# Patient Record
Sex: MALE | Race: WHITE | Employment: OTHER | URBAN - METROPOLITAN AREA
[De-identification: names, ages, dates, MRNs, and addresses within clinical notes are randomized per-mention and may not be internally consistent; named-entity substitution may affect disease eponyms.]

---

## 2017-03-15 ENCOUNTER — TELEPHONE (OUTPATIENT)
Dept: FAMILY MEDICINE | Facility: CLINIC | Age: 72
End: 2017-03-15

## 2017-03-15 DIAGNOSIS — E11.9 TYPE 2 DIABETES MELLITUS WITHOUT COMPLICATION (H): Primary | ICD-10-CM

## 2017-03-15 NOTE — TELEPHONE ENCOUNTER
blood glucose monitoring (NO BRAND SPECIFIED) test strip  Last Written Prescription Date: 4/10/16  Last Fill Quantity: 1,  # refills: 0   Last Office Visit with [facility], [facility] or [facility] prescribing provider: 10/12/16  Pt is wanting this filled today   Call at  [phone]  With questions   Thank you     Rica Vasquez  [facility]

## 2017-03-15 NOTE — TELEPHONE ENCOUNTER
Left message for patient to return call to clinic.    Can refill for 30 days.  Patient is due for office visit for Diabetes. LOV 9/27/16, patient to follow up for OV in 6 mos.      Nila GONZALES Rn

## 2017-03-16 NOTE — TELEPHONE ENCOUNTER
2nd attempt    Called home phone, [phone], it rang 5 times, clicked, then dial tone.  Called once again, same thing happened.  Called cell phone    Left message for patient to return call to clinic.  See message from 3/15//17 regarding refill and office visit.    Nila GONZALES Rn

## 2017-03-16 NOTE — TELEPHONE ENCOUNTER
Patient notified test strips refilled for 30 days, he is due for OV.  Patient declined to make appt at this time, he reports he will check his schedule and call back.    Nila GONZALES Rn

## 2017-03-22 ENCOUNTER — OFFICE VISIT (OUTPATIENT)
Dept: FAMILY MEDICINE | Facility: CLINIC | Age: 72
End: 2017-03-22
Payer: COMMERCIAL

## 2017-03-22 VITALS
OXYGEN SATURATION: 99 % | SYSTOLIC BLOOD PRESSURE: 130 MMHG | WEIGHT: 207 LBS | DIASTOLIC BLOOD PRESSURE: 73 MMHG | TEMPERATURE: 97.5 F | HEIGHT: 70 IN | BODY MASS INDEX: 29.63 KG/M2 | HEART RATE: 56 BPM

## 2017-03-22 DIAGNOSIS — N52.1 ERECTILE DYSFUNCTION DUE TO DISEASES CLASSIFIED ELSEWHERE: ICD-10-CM

## 2017-03-22 DIAGNOSIS — L30.9 ECZEMA, UNSPECIFIED TYPE: ICD-10-CM

## 2017-03-22 DIAGNOSIS — E11.9 TYPE 2 DIABETES MELLITUS WITHOUT COMPLICATION, WITHOUT LONG-TERM CURRENT USE OF INSULIN (H): Primary | ICD-10-CM

## 2017-03-22 DIAGNOSIS — G47.00 INSOMNIA, UNSPECIFIED TYPE: ICD-10-CM

## 2017-03-22 LAB — HBA1C MFR BLD: 6.4 % (ref 4.3–6)

## 2017-03-22 PROCEDURE — 36415 COLL VENOUS BLD VENIPUNCTURE: CPT | Performed by: FAMILY MEDICINE

## 2017-03-22 PROCEDURE — 99214 OFFICE O/P EST MOD 30 MIN: CPT | Performed by: FAMILY MEDICINE

## 2017-03-22 PROCEDURE — 83036 HEMOGLOBIN GLYCOSYLATED A1C: CPT | Performed by: FAMILY MEDICINE

## 2017-03-22 RX ORDER — ZOLPIDEM TARTRATE 12.5 MG/1
12.5 TABLET, FILM COATED, EXTENDED RELEASE ORAL
Qty: 30 TABLET | Refills: 5 | Status: ON HOLD | OUTPATIENT
Start: 2017-03-22 | End: 2021-10-25

## 2017-03-22 RX ORDER — SIMVASTATIN 20 MG
20 TABLET ORAL DAILY
Qty: 90 TABLET | Refills: 3 | Status: SHIPPED | OUTPATIENT
Start: 2017-03-22 | End: 2018-06-17

## 2017-03-22 RX ORDER — TADALAFIL 5 MG/1
2.5 TABLET ORAL DAILY
Qty: 60 TABLET | Refills: 11 | Status: SHIPPED | OUTPATIENT
Start: 2017-03-22 | End: 2017-04-04

## 2017-03-22 RX ORDER — TRIAMCINOLONE ACETONIDE 1 MG/G
CREAM TOPICAL
Qty: 80 G | Refills: 0 | Status: SHIPPED | OUTPATIENT
Start: 2017-03-22 | End: 2018-06-17

## 2017-03-22 RX ORDER — TADALAFIL 5 MG/1
2.5 TABLET ORAL DAILY
Qty: 30 TABLET | Refills: 11 | Status: SHIPPED | OUTPATIENT
Start: 2017-03-22 | End: 2017-03-22

## 2017-03-22 ASSESSMENT — PAIN SCALES - GENERAL: PAINLEVEL: NO PAIN (0)

## 2017-03-22 NOTE — PROGRESS NOTES
SUBJECTIVE:                                                    Remington Gutierrez is a 71 year old male who presents to clinic today for the following health issues:    Apparently the patient has been getting metformin 500 mg b.i.d. We will continue the present dose.    Diabetes: His hemoglobin A1c is 6.4    I recommended that he try getting his Cialis from [facility].    He needs a new glucometer.      Diabetes Follow-up      Patient is checking blood sugars: not at all    Diabetic concerns: None and other - pt has not been eating well has      Symptoms of hypoglycemia (low blood sugar): none     Paresthesias (numbness or burning in feet) or sores: Yes tingling in ankles     Date of last diabetic eye exam: 2016       Amount of exercise or physical activity: 2-3 days/week for an average of 30-45 minutes    Problems taking medications regularly: No    Medication side effects: none    Diet: regular (no restrictions)          Problem list and histories reviewed & adjusted, as indicated.  Additional history: as documented        Reviewed and updated as needed this visit by clinical staff  Allergies       Reviewed and updated as needed this visit by Provider        Medical, surgical, family, social histories, allergies and meds reviewed and updated.    ROS:  General: No change in weight, sleep or appetite.  Normal energy.  No fever or chills  Resp: No coughing, wheezing or shortness of breath  CV: No chest pains or palpitations  GI: No nausea, vomiting,  heartburn, abdominal pain, diarrhea, constipation or change in bowel habits    Exam:  GENERAL APPEARANCE ADULT: Alert, no acute distress  NECK: No adenopathy,masses or thyromegaly  RESP: lungs clear to auscultation   CV: normal rate, regular rhythm, no murmur or gallop  ABDOMEN: soft, no organomegaly, masses or tenderness    ASSESSMENT:  (E11.9) Type 2 diabetes mellitus without complication, without long-term current use of insulin (H)  (primary encounter  diagnosis)  Comment: Stable on medications.  Plan: blood glucose monitoring (NO BRAND SPECIFIED)         test strip, order for DME, metFORMIN         (GLUCOPHAGE) 500 MG tablet, simvastatin (ZOCOR)        20 MG tablet            (L30.9) Eczema, unspecified type  Comment: Stable on medications.  Plan: triamcinolone (KENALOG) 0.1 % cream            (N52.1) Erectile dysfunction due to diseases classified elsewhere  Comment: Stable on medications.  Plan: tadalafil (CIALIS) 5 MG tablet, DISCONTINUED:         tadalafil (CIALIS) 5 MG tablet            (G47.00) Insomnia, unspecified type  Comment: Stable on medications.  Plan: zolpidem (AMBIEN CR) 12.5 MG CR tablet              PLAN:  Orders Placed This Encounter     Hemoglobin A1c     blood glucose monitoring (NO BRAND SPECIFIED) test strip     order for DME     triamcinolone (KENALOG) 0.1 % cream     DISCONTD: tadalafil (CIALIS) 5 MG tablet     tadalafil (CIALIS) 5 MG tablet     metFORMIN (GLUCOPHAGE) 500 MG tablet     zolpidem (AMBIEN CR) 12.5 MG CR tablet     simvastatin (ZOCOR) 20 MG tablet   Recheck in 6 months.    There are no Patient Instructions on file for this visit.      Jake Vega

## 2017-03-22 NOTE — NURSING NOTE
"Chief Complaint   Patient presents with     RECHECK     diabetes needs foot exam       Initial /73 (BP Location: Right arm, Patient Position: Chair, Cuff Size: Adult Large)  Pulse 56  Temp 97.5  F (36.4  C) (Tympanic)  Ht 5' 10\" (1.778 m)  Wt 207 lb (93.9 kg)  SpO2 99%  BMI 29.7 kg/m2 Estimated body mass index is 29.7 kg/(m^2) as calculated from the following:    Height as of this encounter: 5' 10\" (1.778 m).    Weight as of this encounter: 207 lb (93.9 kg).  Medication Reconciliation: complete   Kassy ESCALANTE      "

## 2017-03-22 NOTE — MR AVS SNAPSHOT
"              After Visit Summary   3/22/2017    Remington Gutierrez    MRN: [id number]           Patient Information     Date Of Birth          1945        Visit Information        Provider Department      3/22/2017 11:00 AM Jake Vega MD [facility]        Today's Diagnoses     Type 2 diabetes mellitus without complication, without long-term current use of insulin (H)    -  1    Eczema, unspecified type        Erectile dysfunction due to diseases classified elsewhere        Insomnia, unspecified type           Follow-ups after your visit        Who to contact     If you have questions or need follow up information about today's clinic visit or your schedule please contact [facility] directly at [phone].  Normal or non-critical lab and imaging results will be communicated to you by MyChart, letter or phone within 4 business days after the clinic has received the results. If you do not hear from us within 7 days, please contact the clinic through MyChart or phone. If you have a critical or abnormal lab result, we will notify you by phone as soon as possible.  Submit refill requests through [employer] or call your pharmacy and they will forward the refill request to us. Please allow 3 business days for your refill to be completed.          Additional Information About Your Visit        MyChart Information     [employer] lets you send messages to your doctor, view your test results, renew your prescriptions, schedule appointments and more. To sign up, go to [email or web address] . Click on \"Log in\" on the left side of the screen, which will take you to the Welcome page. Then click on \"Sign up Now\" on the right side of the page.     You will be asked to enter the access code listed below, as well as some personal information. Please follow the directions to create your username and password.     Your access code is: [email or web address]  Expires: 6/20/2017 11:36 AM   " "  Your access code will  in 90 days. If you need help or a new code, please call your [facility] clinic or [phone].        Care EveryWhere ID     This is your Care EveryWhere ID. This could be used by other organizations to access your [facility] medical records  [id number]        Your Vitals Were     Pulse Temperature Height Pulse Oximetry BMI (Body Mass Index)       56 97.5  F (36.4  C) (Tympanic) 5' 10\" (1.778 m) 99% 29.7 kg/m2        Blood Pressure from Last 3 Encounters:   17 130/73   16 126/66   10/12/16 125/75    Weight from Last 3 Encounters:   17 207 lb (93.9 kg)   16 206 lb (93.4 kg)   10/12/16 206 lb (93.4 kg)              We Performed the Following     Hemoglobin A1c          Today's Medication Changes          These changes are accurate as of: 3/22/17 11:36 AM.  If you have any questions, ask your nurse or doctor.               Start taking these medicines.        Dose/Directions    order for DME   Used for:  Type 2 diabetes mellitus without complication, without long-term current use of insulin (H)   Started by:  Jake Vega MD        Equipment being ordered:   Touch 1 glucose meter   Quantity:  1 Device   Refills:  0       tadalafil 5 MG tablet   Commonly known as:  CIALIS   Used for:  Erectile dysfunction due to diseases classified elsewhere   Started by:  Jaek Vega MD        Dose:  2.5 mg   Take 0.5 tablets (2.5 mg) by mouth daily Never use with nitroglycerin, terazosin or doxazosin.   Quantity:  60 tablet   Refills:  11         These medicines have changed or have updated prescriptions.        Dose/Directions    * triamcinolone 0.1 % cream   Commonly known as:  KENALOG   This may have changed:  Another medication with the same name was added. Make sure you understand how and when to take each.   Used for:  Contact dermatitis and other eczema, due to unspecified cause   Changed by:  Jake Vega MD        Apply sparingly to " affected area three times daily as needed   Quantity:  80 g   Refills:  1       * triamcinolone 0.1 % cream   Commonly known as:  KENALOG   This may have changed:  You were already taking a medication with the same name, and this prescription was added. Make sure you understand how and when to take each.   Used for:  Eczema, unspecified type   Changed by:  Jake Vega MD        Apply sparingly to affected area three times daily as needed   Quantity:  80 g   Refills:  0       * Notice:  This list has 2 medication(s) that are the same as other medications prescribed for you. Read the directions carefully, and ask your doctor or other care provider to review them with you.      Stop taking these medicines if you haven't already. Please contact your care team if you have questions.     donepezil 10 MG tablet   Commonly known as:  ARICEPT   Stopped by:  Jake Vega MD           PARoxetine 20 MG tablet   Commonly known as:  PAXIL   Stopped by:  Jake Vega MD                Where to get your medicines      These medications were sent to [facility] [address] [place] [address] AT [address] [address]     Phone:  [phone]     blood glucose monitoring test strip    triamcinolone 0.1 % cream         Some of these will need a paper prescription and others can be bought over the counter.  Ask your nurse if you have questions.     Bring a paper prescription for each of these medications     metFORMIN 500 MG tablet    order for DME    simvastatin 20 MG tablet    tadalafil 5 MG tablet    zolpidem 12.5 MG CR tablet                Primary Care Provider Office Phone # Fax #    Jake Vega [phone]       [facility] [id number] [place] [address]        Thank you!     Thank you for choosing [facility]  for your care. Our goal is always to provide you  with excellent care. Hearing back from our patients is one way we can continue to improve our services. Please take a few minutes to complete the written survey that you may receive in the mail after your visit with us. Thank you!             Your Updated Medication List - Protect others around you: Learn how to safely use, store and throw away your medicines at www.disposemymeds.org.          This list is accurate as of: 3/22/17 11:36 AM.  Always use your most recent med list.                   Brand Name Dispense Instructions for use    ALPRAZolam 0.5 MG tablet    XANAX         aspirin 81 MG tablet      take 1 Tab by mouth.       blood glucose lancets standard    no brand specified    1 Box    Use to test blood sugar 3 times daily or as directed.       blood glucose monitoring lancets     1 Box    1 each See Admin Instructions Use to test blood sugar 3 times daily or as directed.       blood glucose monitoring meter device kit    no brand specified    1 kit    Use to test blood sugar 3 times daily or as directed.       blood glucose monitoring test strip    no brand specified    1 Box    Use to test blood sugars 3 times daily or as directed       cholecalciferol 400 UNIT Tabs tablet    vitamin D     take 400 Units by mouth daily. (one tablet)       ibuprofen 800 MG tablet    ADVIL/MOTRIN    90 tablet    Take 1 tablet (800 mg) by mouth every 8 hours as needed for moderate pain       lisinopril 10 MG tablet    PRINIVIL/ZESTRIL    90 tablet    Take 1 tablet (10 mg) by mouth daily       Melatonin 10 MG Tabs tablet      Take 10 mg by mouth At Bedtime       metFORMIN 500 MG tablet    GLUCOPHAGE    270 tablet    2 tabs with breakfast and 1 tablet evening meal.       metroNIDAZOLE 0.75 % cream    METROCREAM    45 g    Apply topically once daily.       Multi-vitamin Tabs tablet      Take 1 tablet by mouth daily       * order for DME      CPAP 5-10 cm H20 with heated humidity and a Quattro FFM       * order for DME     1  Device    all related CPAP supplies: mask, headgear, tubing, water chamber, filters       order for DME     1 Device    Equipment being ordered:   Touch 1 glucose meter       oxyCODONE-acetaminophen 5-325 MG per tablet    PERCOCET    40 tablet    Take 1 tablet by mouth every 4 hours as needed for pain (maximum 6 tablets per day)       simvastatin 20 MG tablet    ZOCOR    90 tablet    Take 1 tablet (20 mg) by mouth daily       tadalafil 5 MG tablet    CIALIS    60 tablet    Take 0.5 tablets (2.5 mg) by mouth daily Never use with nitroglycerin, terazosin or doxazosin.       * triamcinolone 0.1 % cream    KENALOG    80 g    Apply sparingly to affected area three times daily as needed       * triamcinolone 0.1 % cream    KENALOG    80 g    Apply sparingly to affected area three times daily as needed       VITAMIN B 12 PO      Take  by mouth.       VITAMIN C PLUS 1000 MG Tabs      Take  by mouth.       zolpidem 12.5 MG CR tablet    AMBIEN CR    30 tablet    Take 1 tablet (12.5 mg) by mouth nightly as needed for sleep       * Notice:  This list has 4 medication(s) that are the same as other medications prescribed for you. Read the directions carefully, and ask your doctor or other care provider to review them with you.

## 2017-03-23 ASSESSMENT — PATIENT HEALTH QUESTIONNAIRE - PHQ9: SUM OF ALL RESPONSES TO PHQ QUESTIONS 1-9: 0

## 2017-04-04 DIAGNOSIS — N52.1 ERECTILE DYSFUNCTION DUE TO DISEASES CLASSIFIED ELSEWHERE: ICD-10-CM

## 2017-04-04 RX ORDER — TADALAFIL 5 MG/1
2.5 TABLET ORAL DAILY
Qty: 60 TABLET | Refills: 11 | Status: SHIPPED | OUTPATIENT
Start: 2017-04-04 | End: 2018-06-17

## 2017-04-04 NOTE — TELEPHONE ENCOUNTER
Reason for Call:  Other prescription    Detailed comments: Pt calling stating his insurance won't cover this medication at [facility]. He is wondering if we can print him a hard copy to mail to [facility]. Pt would like to be called when ready to  so he can mail a check with it.    Phone Number Patient can be reached at: Home number on file [phone] (home) or Cell number on file:    Telephone Information:   Mobile [phone]       Best Time: any      Can we leave a detailed message on this number? YES    Call taken on 4/4/2017 at 11:12 AM by Eli Luque

## 2017-04-12 ENCOUNTER — OFFICE VISIT (OUTPATIENT)
Dept: FAMILY MEDICINE | Facility: CLINIC | Age: 72
End: 2017-04-12
Payer: COMMERCIAL

## 2017-04-12 VITALS
HEART RATE: 63 BPM | SYSTOLIC BLOOD PRESSURE: 140 MMHG | WEIGHT: 217 LBS | DIASTOLIC BLOOD PRESSURE: 75 MMHG | TEMPERATURE: 97.7 F | OXYGEN SATURATION: 97 % | BODY MASS INDEX: 31.14 KG/M2

## 2017-04-12 DIAGNOSIS — J20.9 ACUTE BRONCHITIS, UNSPECIFIED ORGANISM: Primary | ICD-10-CM

## 2017-04-12 PROCEDURE — 99213 OFFICE O/P EST LOW 20 MIN: CPT | Performed by: FAMILY MEDICINE

## 2017-04-12 RX ORDER — AZITHROMYCIN 250 MG/1
TABLET, FILM COATED ORAL
Qty: 6 TABLET | Refills: 0 | Status: SHIPPED | OUTPATIENT
Start: 2017-04-12 | End: 2017-06-07

## 2017-04-12 ASSESSMENT — ANXIETY QUESTIONNAIRES
GAD7 TOTAL SCORE: 1
2. NOT BEING ABLE TO STOP OR CONTROL WORRYING: NOT AT ALL
7. FEELING AFRAID AS IF SOMETHING AWFUL MIGHT HAPPEN: SEVERAL DAYS
3. WORRYING TOO MUCH ABOUT DIFFERENT THINGS: NOT AT ALL
5. BEING SO RESTLESS THAT IT IS HARD TO SIT STILL: NOT AT ALL
6. BECOMING EASILY ANNOYED OR IRRITABLE: NOT AT ALL
IF YOU CHECKED OFF ANY PROBLEMS ON THIS QUESTIONNAIRE, HOW DIFFICULT HAVE THESE PROBLEMS MADE IT FOR YOU TO DO YOUR WORK, TAKE CARE OF THINGS AT HOME, OR GET ALONG WITH OTHER PEOPLE: NOT DIFFICULT AT ALL
1. FEELING NERVOUS, ANXIOUS, OR ON EDGE: NOT AT ALL

## 2017-04-12 ASSESSMENT — PATIENT HEALTH QUESTIONNAIRE - PHQ9: 5. POOR APPETITE OR OVEREATING: NOT AT ALL

## 2017-04-12 ASSESSMENT — PAIN SCALES - GENERAL: PAINLEVEL: MODERATE PAIN (4)

## 2017-04-12 NOTE — PROGRESS NOTES
SUBJECTIVE:                                                    Remington Gutierrez is a 71 year old male who presents to clinic today for the following health issues:    He has had 1 week of a cough productive of clear phlegm, wheezing, chills, sweats, fatigue.as documented      Acute Illness   Acute illness concerns: URI  Onset:2 weeks    Fever: no    Chills/Sweats: YES    Headache (location?): YES    Sinus Pressure:YES    Conjunctivitis:  no    Ear Pain: no    Rhinorrhea: YES    Congestion: YES    Sore Throat: no     Cough: YES-productive of clear sputum, productive of yellow sputum    Wheeze: YES    Decreased Appetite: no    Nausea: no    Vomiting: no    Diarrhea:  YES    Dysuria/Freq.: no    Fatigue/Achiness: YES    Sick/Strep Exposure: no     Therapies Tried and outcome: musinex, kin seltzer, tylenol, ASA          Problem list and histories reviewed & adjusted, as indicated.  Additional history: as documented        Reviewed and updated as needed this visit by clinical staff  Tobacco  Allergies  Med Hx  Surg Hx  Fam Hx  Soc Hx      Reviewed and updated as needed this visit by Provider        Medical, surgical, family, social histories, allergies and meds reviewed and updated.    ROS:  General: chills, sweats, fatigue  Resp: cough-productive  CV: No chest pains or palpitations  GI: No nausea, vomiting,  heartburn, abdominal pain, diarrhea, constipation or change in bowel habits    Exam:  GENERAL APPEARANCE ADULT: Alert, no acute distress  HENT: Ears and TMs normal, oral mucosa and posterior oropharynx normal  NECK: No adenopathy,masses or thyromegaly  RESP: expiratory wheezes    ASSESSMENT:  (J20.9) Acute bronchitis, unspecified organism  (primary encounter diagnosis)  Comment:   Plan: azithromycin (ZITHROMAX Z-TATO) 250 MG tablet              PLAN:  Orders Placed This Encounter     azithromycin (ZITHROMAX Z-TATO) 250 MG tablet   Recheck in 1 week, if not improving.      There are no Patient Instructions  on file for this visit.      Jake Vega

## 2017-04-12 NOTE — NURSING NOTE
"Chief Complaint   Patient presents with     URI     cough, chest congestion, sinus pressure and drainage X 2 weeks       Initial /75 (BP Location: Right arm, Patient Position: Chair, Cuff Size: Adult Large)  Pulse 63  Temp 97.7  F (36.5  C) (Tympanic)  Wt 217 lb (98.4 kg)  SpO2 97%  BMI 31.14 kg/m2 Estimated body mass index is 31.14 kg/(m^2) as calculated from the following:    Height as of 3/22/17: 5' 10\" (1.778 m).    Weight as of this encounter: 217 lb (98.4 kg).  Medication Reconciliation: complete   Kassy Grimm CMA       "

## 2017-04-12 NOTE — MR AVS SNAPSHOT
"              After Visit Summary   2017    Remington Gutierrez    MRN: [id number]           Patient Information     Date Of Birth          1945        Visit Information        Provider Department      2017 10:00 AM Jake Vega MD [facility]        Today's Diagnoses     Acute bronchitis, unspecified organism    -  1       Follow-ups after your visit        Who to contact     If you have questions or need follow up information about today's clinic visit or your schedule please contact [facility] directly at [phone].  Normal or non-critical lab and imaging results will be communicated to you by [employer], letter or phone within 4 business days after the clinic has received the results. If you do not hear from us within 7 days, please contact the clinic through [employer] or phone. If you have a critical or abnormal lab result, we will notify you by phone as soon as possible.  Submit refill requests through [employer] or call your pharmacy and they will forward the refill request to us. Please allow 3 business days for your refill to be completed.          Additional Information About Your Visit        MyChart Information     [employer] lets you send messages to your doctor, view your test results, renew your prescriptions, schedule appointments and more. To sign up, go to [email or web address] . Click on \"Log in\" on the left side of the screen, which will take you to the Welcome page. Then click on \"Sign up Now\" on the right side of the page.     You will be asked to enter the access code listed below, as well as some personal information. Please follow the directions to create your username and password.     Your access code is: [email or web address]  Expires: 2017 11:36 AM     Your access code will  in 90 days. If you need help or a new code, please call your [facility] or [phone].        Care EveryWhere ID     This is your Care EveryWhere " ID. This could be used by other organizations to access your [facility] medical records  [id number]        Your Vitals Were     Pulse Temperature Pulse Oximetry BMI (Body Mass Index)          63 97.7  F (36.5  C) (Tympanic) 97% 31.14 kg/m2         Blood Pressure from Last 3 Encounters:   04/12/17 140/75   03/22/17 130/73   11/04/16 126/66    Weight from Last 3 Encounters:   04/12/17 217 lb (98.4 kg)   03/22/17 207 lb (93.9 kg)   11/04/16 206 lb (93.4 kg)              Today, you had the following     No orders found for display         Today's Medication Changes          These changes are accurate as of: 4/12/17 10:49 AM.  If you have any questions, ask your nurse or doctor.               Start taking these medicines.        Dose/Directions    azithromycin 250 MG tablet   Commonly known as:  ZITHROMAX Z-TATO   Used for:  Acute bronchitis, unspecified organism   Started by:  Jake Vega MD        2 tabs stat and 1 tab on Day 2-5   Quantity:  6 tablet   Refills:  0            Where to get your medicines      These medications were sent to [facility] [address] [place] [address] [address] [facility] AT [facility] OF [address] [place] [address]  [address] [facility] [address]     Phone:  [phone]     azithromycin 250 MG tablet                Primary Care Provider Office Phone # Fax #    Jake Vega [phone]       [address] [address]        Thank you!     Thank you for choosing [facility]  for your care. Our goal is always to provide you with excellent care. Hearing back from our patients is one way we can continue to improve our services. Please take a few minutes to complete the written survey that you may receive in the mail after your visit with us. Thank you!             Your Updated Medication List - Protect others around you: Learn how to safely use, store and throw away your medicines at  www.disposemymeds.org.          This list is accurate as of: 4/12/17 10:49 AM.  Always use your most recent med list.                   Brand Name Dispense Instructions for use    ALPRAZolam 0.5 MG tablet    XANAX         aspirin 81 MG tablet      take 1 Tab by mouth.       azithromycin 250 MG tablet    ZITHROMAX Z-TATO    6 tablet    2 tabs stat and 1 tab on Day 2-5       blood glucose lancets standard    no brand specified    1 Box    Use to test blood sugar 3 times daily or as directed.       blood glucose monitoring lancets     1 Box    1 each See Admin Instructions Use to test blood sugar 3 times daily or as directed.       blood glucose monitoring meter device kit    no brand specified    1 kit    Use to test blood sugar 3 times daily or as directed.       blood glucose monitoring test strip    no brand specified    1 Box    Use to test blood sugars 3 times daily or as directed       cholecalciferol 400 UNIT Tabs tablet    vitamin D     take 400 Units by mouth daily. (one tablet)       ibuprofen 800 MG tablet    ADVIL/MOTRIN    90 tablet    Take 1 tablet (800 mg) by mouth every 8 hours as needed for moderate pain       lisinopril 10 MG tablet    PRINIVIL/ZESTRIL    90 tablet    Take 1 tablet (10 mg) by mouth daily       Melatonin 10 MG Tabs tablet      Take 10 mg by mouth At Bedtime       metFORMIN 500 MG tablet    GLUCOPHAGE    270 tablet    2 tabs with breakfast and 1 tablet evening meal.       metroNIDAZOLE 0.75 % cream    METROCREAM    45 g    Apply topically once daily.       Multi-vitamin Tabs tablet      Take 1 tablet by mouth daily       * order for DME      CPAP 5-10 cm H20 with heated humidity and a Quattro FFM       * order for DME     1 Device    all related CPAP supplies: mask, headgear, tubing, water chamber, filters       order for DME     1 Device    Equipment being ordered:   Touch 1 glucose meter       oxyCODONE-acetaminophen 5-325 MG per tablet    PERCOCET    40 tablet    Take 1 tablet by  mouth every 4 hours as needed for pain (maximum 6 tablets per day)       simvastatin 20 MG tablet    ZOCOR    90 tablet    Take 1 tablet (20 mg) by mouth daily       tadalafil 5 MG tablet    CIALIS    60 tablet    Take 0.5 tablets (2.5 mg) by mouth daily Never use with nitroglycerin, terazosin or doxazosin.       * triamcinolone 0.1 % cream    KENALOG    80 g    Apply sparingly to affected area three times daily as needed       * triamcinolone 0.1 % cream    KENALOG    80 g    Apply sparingly to affected area three times daily as needed       VITAMIN B 12 PO      Take  by mouth.       VITAMIN C PLUS 1000 MG Tabs      Take  by mouth.       zolpidem 12.5 MG CR tablet    AMBIEN CR    30 tablet    Take 1 tablet (12.5 mg) by mouth nightly as needed for sleep       * Notice:  This list has 4 medication(s) that are the same as other medications prescribed for you. Read the directions carefully, and ask your doctor or other care provider to review them with you.

## 2017-04-13 ASSESSMENT — ANXIETY QUESTIONNAIRES: GAD7 TOTAL SCORE: 1

## 2017-04-13 ASSESSMENT — PATIENT HEALTH QUESTIONNAIRE - PHQ9: SUM OF ALL RESPONSES TO PHQ QUESTIONS 1-9: 0

## 2017-05-18 ENCOUNTER — TELEPHONE (OUTPATIENT)
Dept: FAMILY MEDICINE | Facility: CLINIC | Age: 72
End: 2017-05-18

## 2017-05-18 NOTE — TELEPHONE ENCOUNTER
[facility] Metforminn dose increase to be filled.  Lab result note,lab results and med order fax[facility] to [facility] per [facility] request.  KpavelRN

## 2017-06-07 ENCOUNTER — OFFICE VISIT (OUTPATIENT)
Dept: FAMILY MEDICINE | Facility: CLINIC | Age: 72
End: 2017-06-07
Payer: COMMERCIAL

## 2017-06-07 VITALS
BODY MASS INDEX: 30.56 KG/M2 | WEIGHT: 213 LBS | DIASTOLIC BLOOD PRESSURE: 80 MMHG | HEART RATE: 59 BPM | SYSTOLIC BLOOD PRESSURE: 130 MMHG | TEMPERATURE: 97.4 F | OXYGEN SATURATION: 98 %

## 2017-06-07 DIAGNOSIS — F32.A DEPRESSION, UNSPECIFIED DEPRESSION TYPE: ICD-10-CM

## 2017-06-07 DIAGNOSIS — R53.83 FATIGUE, UNSPECIFIED TYPE: ICD-10-CM

## 2017-06-07 DIAGNOSIS — J31.0 CHRONIC RHINITIS: ICD-10-CM

## 2017-06-07 DIAGNOSIS — S43.421D SPRAIN OF RIGHT ROTATOR CUFF CAPSULE, SUBSEQUENT ENCOUNTER: ICD-10-CM

## 2017-06-07 DIAGNOSIS — L71.9 ROSACEA: ICD-10-CM

## 2017-06-07 DIAGNOSIS — G47.33 OSA (OBSTRUCTIVE SLEEP APNEA): ICD-10-CM

## 2017-06-07 DIAGNOSIS — E11.9 TYPE 2 DIABETES MELLITUS WITHOUT COMPLICATION, WITHOUT LONG-TERM CURRENT USE OF INSULIN (H): Primary | ICD-10-CM

## 2017-06-07 LAB
ALBUMIN SERPL-MCNC: 4.3 G/DL (ref 3.4–5)
ALP SERPL-CCNC: 78 U/L (ref 40–150)
ALT SERPL W P-5'-P-CCNC: 28 U/L (ref 0–70)
ANION GAP SERPL CALCULATED.3IONS-SCNC: 13 MMOL/L (ref 3–14)
AST SERPL W P-5'-P-CCNC: 29 U/L (ref 0–45)
BASOPHILS # BLD AUTO: 0 10E9/L (ref 0–0.2)
BASOPHILS NFR BLD AUTO: 0.4 %
BILIRUB SERPL-MCNC: 0.6 MG/DL (ref 0.2–1.3)
BUN SERPL-MCNC: 21 MG/DL (ref 7–30)
CALCIUM SERPL-MCNC: 9 MG/DL (ref 8.5–10.1)
CHLORIDE SERPL-SCNC: 108 MMOL/L (ref 94–109)
CO2 SERPL-SCNC: 19 MMOL/L (ref 20–32)
CREAT SERPL-MCNC: 0.96 MG/DL (ref 0.66–1.25)
DIFFERENTIAL METHOD BLD: NORMAL
EOSINOPHIL # BLD AUTO: 0.6 10E9/L (ref 0–0.7)
EOSINOPHIL NFR BLD AUTO: 6.8 %
ERYTHROCYTE [DISTWIDTH] IN BLOOD BY AUTOMATED COUNT: 13 % (ref 10–15)
GFR SERPL CREATININE-BSD FRML MDRD: 77 ML/MIN/1.7M2
GLUCOSE SERPL-MCNC: 82 MG/DL (ref 70–99)
HCT VFR BLD AUTO: 45.8 % (ref 40–53)
HGB BLD-MCNC: 15 G/DL (ref 13.3–17.7)
LDLC SERPL DIRECT ASSAY-MCNC: 45 MG/DL
LYMPHOCYTES # BLD AUTO: 2.2 10E9/L (ref 0.8–5.3)
LYMPHOCYTES NFR BLD AUTO: 23.6 %
MCH RBC QN AUTO: 32.6 PG (ref 26.5–33)
MCHC RBC AUTO-ENTMCNC: 32.8 G/DL (ref 31.5–36.5)
MCV RBC AUTO: 100 FL (ref 78–100)
MONOCYTES # BLD AUTO: 1 10E9/L (ref 0–1.3)
MONOCYTES NFR BLD AUTO: 10.6 %
NEUTROPHILS # BLD AUTO: 5.3 10E9/L (ref 1.6–8.3)
NEUTROPHILS NFR BLD AUTO: 58.6 %
PLATELET # BLD AUTO: 169 10E9/L (ref 150–450)
POTASSIUM SERPL-SCNC: 4.5 MMOL/L (ref 3.4–5.3)
PROT SERPL-MCNC: 7.5 G/DL (ref 6.8–8.8)
RBC # BLD AUTO: 4.6 10E12/L (ref 4.4–5.9)
SODIUM SERPL-SCNC: 140 MMOL/L (ref 133–144)
TSH SERPL DL<=0.005 MIU/L-ACNC: 1.69 MU/L (ref 0.4–4)
WBC # BLD AUTO: 9.1 10E9/L (ref 4–11)

## 2017-06-07 PROCEDURE — 99214 OFFICE O/P EST MOD 30 MIN: CPT | Performed by: FAMILY MEDICINE

## 2017-06-07 PROCEDURE — 80050 GENERAL HEALTH PANEL: CPT | Performed by: FAMILY MEDICINE

## 2017-06-07 PROCEDURE — 36415 COLL VENOUS BLD VENIPUNCTURE: CPT | Performed by: FAMILY MEDICINE

## 2017-06-07 PROCEDURE — 83721 ASSAY OF BLOOD LIPOPROTEIN: CPT | Performed by: FAMILY MEDICINE

## 2017-06-07 PROCEDURE — 99207 C FOOT EXAM  NO CHARGE: CPT | Performed by: FAMILY MEDICINE

## 2017-06-07 RX ORDER — FLUTICASONE PROPIONATE 50 MCG
2 SPRAY, SUSPENSION (ML) NASAL DAILY
Qty: 1 BOTTLE | Refills: 11 | Status: SHIPPED | OUTPATIENT
Start: 2017-06-07 | End: 2018-06-17

## 2017-06-07 RX ORDER — LISINOPRIL 10 MG/1
10 TABLET ORAL DAILY
Qty: 90 TABLET | Refills: 3 | Status: ON HOLD | OUTPATIENT
Start: 2017-06-07 | End: 2021-10-09

## 2017-06-07 ASSESSMENT — PAIN SCALES - GENERAL: PAINLEVEL: NO PAIN (0)

## 2017-06-07 NOTE — LETTER
[facility]  [address]  [facility] [address]  Phone: [phone]    June 8, 2017    Remington Gutierrez  [address] [facility] [address]          Dear Remington,    The results of your recent lab tests were within normal limits. Enclosed is a copy of these results.  If you have any further questions or problems, please contact our office.  Results for orders placed or performed in visit on 06/07/17   LDL cholesterol direct   Result Value Ref Range    LDL Cholesterol Direct 45 <100 mg/dL   CBC with platelets differential   Result Value Ref Range    WBC 9.1 4.0 - 11.0 10e9/L    RBC Count 4.60 4.4 - 5.9 10e12/L    Hemoglobin 15.0 13.3 - 17.7 g/dL    Hematocrit 45.8 40.0 - 53.0 %     78 - 100 fl    MCH 32.6 26.5 - 33.0 pg    MCHC 32.8 31.5 - 36.5 g/dL    RDW 13.0 10.0 - 15.0 %    Platelet Count 169 150 - 450 10e9/L    Diff Method Automated Method     % Neutrophils 58.6 %    % Lymphocytes 23.6 %    % Monocytes 10.6 %    % Eosinophils 6.8 %    % Basophils 0.4 %    Absolute Neutrophil 5.3 1.6 - 8.3 10e9/L    Absolute Lymphocytes 2.2 0.8 - 5.3 10e9/L    Absolute Monocytes 1.0 0.0 - 1.3 10e9/L    Absolute Eosinophils 0.6 0.0 - 0.7 10e9/L    Absolute Basophils 0.0 0.0 - 0.2 10e9/L   Comprehensive metabolic panel   Result Value Ref Range    Sodium 140 133 - 144 mmol/L    Potassium 4.5 3.4 - 5.3 mmol/L    Chloride 108 94 - 109 mmol/L    Carbon Dioxide 19 (L) 20 - 32 mmol/L    Anion Gap 13 3 - 14 mmol/L    Glucose 82 70 - 99 mg/dL    Urea Nitrogen 21 7 - 30 mg/dL    Creatinine 0.96 0.66 - 1.25 mg/dL    GFR Estimate 77 >60 mL/min/1.7m2    GFR Estimate If Black >90   GFR Calc   >60 mL/min/1.7m2    Calcium 9.0 8.5 - 10.1 mg/dL    Bilirubin Total 0.6 0.2 - 1.3 mg/dL    Albumin 4.3 3.4 - 5.0 g/dL    Protein Total 7.5 6.8 - 8.8 g/dL    Alkaline Phosphatase 78 40 - 150 U/L    ALT 28 0 - 70 U/L    AST 29 0 - 45 U/L   TSH with free T4 reflex   Result Value Ref Range    TSH  1.69 0.40 - 4.00 mU/L     Sincerely,      Jake Vega MD

## 2017-06-07 NOTE — PROGRESS NOTES
SUBJECTIVE:                                                    Remington Gutierrez is a 71 year old male who presents to clinic today for the following health issues:    Diabetes: Hemoglobin A1c is 6.4. At some point he stopped taking his lisinopril. Restart lisinopril 10 mg daily.    Rosacea: He needs refills of his metronidazole cream.    Sleep apnea: His CPAP machine is broken. He will consider going to the [facility] for a consultation and new CPAP machine. [facility]  He will try Flonase for nasal congestion.    He has had significant fatigue for months. We will check labs.    He has a right shoulder rotator cuff tear. He will see orthopedics this fall.  Total face to face time: 25 minutes.  Time spent counseling on 15 minutes as outline above.        Recheck rocesia and pt wants a nasal spray for nasal stuffiness.        Problem list and histories reviewed & adjusted, as indicated.  Additional history: as documented        Reviewed and updated as needed this visit by clinical staff  Tobacco  Allergies  Med Hx  Surg Hx  Fam Hx  Soc Hx      Reviewed and updated as needed this visit by Provider        Medical, surgical, family, social histories, allergies and meds reviewed and updated.    ROS:  General: fatigue  Resp: No coughing, wheezing or shortness of breath  CV: No chest pains or palpitations  GI: No nausea, vomiting,  heartburn, abdominal pain, diarrhea, constipation or change in bowel habits    Exam:  GENERAL APPEARANCE ADULT: Alert, no acute distress  NECK: No adenopathy,masses or thyromegaly  RESP: lungs clear to auscultation   CV: normal rate, regular rhythm, no murmur or gallop  ABDOMEN: soft, no organomegaly, masses or tenderness  MS: Bilateral feet: Sensation 4/4, circulation 3/4 with pedal pulses.    ASSESSMENT:  (E11.9) Type 2 diabetes mellitus without complication, without long-term current use of insulin (H)   (primary encounter diagnosis)  Comment:   Plan: FOOT EXAM, LDL cholesterol direct, lisinopril         (PRINIVIL/ZESTRIL) 10 MG tablet, CANCELED:         Basic metabolic panel            (F32.9) Depression, unspecified depression type  Comment:   Plan: DEPRESSION ACTION PLAN (DAP)            (G47.33) BENNY (obstructive sleep apnea)  Comment:   Plan: SLEEP EVALUATION & MANAGEMENT REFERRAL - ADULT            (L71.9) Rosacea  Comment: Stable on medications.  Plan: metroNIDAZOLE (METROCREAM) 0.75 % cream            (J31.0) Chronic rhinitis  Comment:   Plan: fluticasone (FLONASE) 50 MCG/ACT spray            (R53.83) Fatigue, unspecified type  Comment:   Plan: CBC with platelets differential, Comprehensive         metabolic panel, TSH with free T4 reflex            (S43.421D) Sprain of right rotator cuff capsule, subsequent encounter  Comment:   Plan: ORTHO  REFERRAL              PLAN:  Orders Placed This Encounter     FOOT EXAM     DEPRESSION ACTION PLAN (DAP)     LDL cholesterol direct     CBC with platelets differential     Comprehensive metabolic panel     TSH with free T4 reflex     SLEEP EVALUATION & MANAGEMENT REFERRAL - ADULT     ORTHO  REFERRAL     metroNIDAZOLE (METROCREAM) 0.75 % cream     fluticasone (FLONASE) 50 MCG/ACT spray     lisinopril (PRINIVIL/ZESTRIL) 10 MG tablet   Recheck in clinic as needed.      There are no Patient Instructions on file for this visit.      Jake Vega

## 2017-06-07 NOTE — MR AVS SNAPSHOT
After Visit Summary   6/7/2017    Remington Gutierrez    MRN: [id number]           Patient Information     Date Of Birth          1945        Visit Information        Provider Department      6/7/2017 10:40 AM Jake Vega MD [facility]        Today's Diagnoses     Type 2 diabetes mellitus without complication, without long-term current use of insulin (H)    -  1    Depression, unspecified depression type        BENNY (obstructive sleep apnea)        Rosacea        Chronic rhinitis        Fatigue, unspecified type           Follow-ups after your visit        Additional Services     SLEEP EVALUATION & MANAGEMENT REFERRAL - ADULT       Please be aware that coverage of these services is subject to the terms and limitations of your health insurance plan.  Call member services at your health plan with any benefit or coverage questions.      Please bring the following to your appointment:    >>   List of current medications   >>   This referral request   >>   Any documents/labs given to you for this referral    [facility] Lab  Ph [phone] (Age 2 and up)                  Future tests that were ordered for you today     Open Future Orders        Priority Expected Expires Ordered    SLEEP EVALUATION & MANAGEMENT REFERRAL - ADULT Routine  6/7/2018 6/7/2017            Who to contact     If you have questions or need follow up information about today's clinic visit or your schedule please contact [facility] directly at [phone].  Normal or non-critical lab and imaging results will be communicated to you by MyChart, letter or phone within 4 business days after the clinic has received the results. If you do not hear from us within 7 days, please contact the clinic through MyChart or phone. If you have a critical or abnormal lab result, we will notify you by phone as soon as possible.  Submit refill requests through [facility] or call  "your pharmacy and they will forward the refill request to us. Please allow 3 business days for your refill to be completed.          Additional Information About Your Visit        [employer] Information     [employer] lets you send messages to your doctor, view your test results, renew your prescriptions, schedule appointments and more. To sign up, go to [email or web address] . Click on \"Log in\" on the left side of the screen, which will take you to the Welcome page. Then click on \"Sign up Now\" on the right side of the page.     You will be asked to enter the access code listed below, as well as some personal information. Please follow the directions to create your username and password.     Your access code is: [email or web address]  Expires: 2017 11:36 AM     Your access code will  in 90 days. If you need help or a new code, please call your [facility] clinic or [phone].        Care EveryWhere ID     This is your Care EveryWhere ID. This could be used by other organizations to access your [facility] medical records  [id number]        Your Vitals Were     Pulse Temperature Pulse Oximetry BMI (Body Mass Index)          59 97.4  F (36.3  C) (Tympanic) 98% 30.56 kg/m2         Blood Pressure from Last 3 Encounters:   17 130/80   17 140/75   17 130/73    Weight from Last 3 Encounters:   17 213 lb (96.6 kg)   17 217 lb (98.4 kg)   17 207 lb (93.9 kg)              We Performed the Following     CBC with platelets differential     Comprehensive metabolic panel     DEPRESSION ACTION PLAN (DAP)     FOOT EXAM     LDL cholesterol direct     TSH with free T4 reflex          Today's Medication Changes          These changes are accurate as of: 17 11:03 AM.  If you have any questions, ask your nurse or doctor.               Start taking these medicines.        Dose/Directions    fluticasone 50 MCG/ACT spray   Commonly known as:  FLONASE   Used for:  Chronic rhinitis   Started by:  " Jake Vega MD        Dose:  2 spray   Spray 2 sprays into both nostrils daily   Quantity:  1 Bottle   Refills:  11         Stop taking these medicines if you haven't already. Please contact your care team if you have questions.     oxyCODONE-acetaminophen 5-325 MG per tablet   Commonly known as:  PERCOCET   Stopped by:  Jake Vega MD                Where to get your medicines      These medications were sent to [facility] [address] [place] [address] AT [address]  [address] [facility] [address]     Phone:  [phone]     fluticasone 50 MCG/ACT spray    lisinopril 10 MG tablet    metroNIDAZOLE 0.75 % cream                Primary Care Provider Office Phone # Fax #    Jake Vega [phone]       [facility] [address] [facility] [address]        Thank you!     Thank you for choosing [facility]  for your care. Our goal is always to provide you with excellent care. Hearing back from our patients is one way we can continue to improve our services. Please take a few minutes to complete the written survey that you may receive in the mail after your visit with us. Thank you!             Your Updated Medication List - Protect others around you: Learn how to safely use, store and throw away your medicines at www.disposemymeds.org.          This list is accurate as of: 6/7/17 11:03 AM.  Always use your most recent med list.                   Brand Name Dispense Instructions for use    ALPRAZolam 0.5 MG tablet    XANAX         aspirin 81 MG tablet      take 1 Tab by mouth.       blood glucose lancets standard    no brand specified    1 Box    Use to test blood sugar 3 times daily or as directed.       blood glucose monitoring lancets     1 Box    1 each See Admin Instructions Use to test blood sugar 3 times daily or as directed.       blood glucose monitoring meter device kit     no brand specified    1 kit    Use to test blood sugar 3 times daily or as directed.       blood glucose monitoring test strip    no brand specified    1 Box    Use to test blood sugars 3 times daily or as directed       cholecalciferol 400 UNIT Tabs tablet    vitamin D     take 400 Units by mouth daily. (one tablet)       fluticasone 50 MCG/ACT spray    FLONASE    1 Bottle    Spray 2 sprays into both nostrils daily       ibuprofen 800 MG tablet    ADVIL/MOTRIN    90 tablet    Take 1 tablet (800 mg) by mouth every 8 hours as needed for moderate pain       lisinopril 10 MG tablet    PRINIVIL/ZESTRIL    90 tablet    Take 1 tablet (10 mg) by mouth daily       Melatonin 10 MG Tabs tablet      Take 10 mg by mouth At Bedtime       metFORMIN 500 MG tablet    GLUCOPHAGE    180 tablet    1 tabs with breakfast and 1 tablet evening meal.       metroNIDAZOLE 0.75 % cream    METROCREAM    45 g    Apply topically once daily.       Multi-vitamin Tabs tablet      Take 1 tablet by mouth daily       * order for DME      CPAP 5-10 cm H20 with heated humidity and a Quattro FFM       * order for DME     1 Device    all related CPAP supplies: mask, headgear, tubing, water chamber, filters       order for DME     1 Device    Equipment being ordered:   Touch 1 glucose meter       simvastatin 20 MG tablet    ZOCOR    90 tablet    Take 1 tablet (20 mg) by mouth daily       tadalafil 5 MG tablet    CIALIS    60 tablet    Take 0.5 tablets (2.5 mg) by mouth daily Never use with nitroglycerin, terazosin or doxazosin.       * triamcinolone 0.1 % cream    KENALOG    80 g    Apply sparingly to affected area three times daily as needed       * triamcinolone 0.1 % cream    KENALOG    80 g    Apply sparingly to affected area three times daily as needed       VITAMIN B 12 PO      Take  by mouth.       VITAMIN C PLUS 1000 MG Tabs      Take  by mouth.       zolpidem 12.5 MG CR tablet    AMBIEN CR    30 tablet    Take 1 tablet (12.5 mg) by mouth nightly  as needed for sleep       * Notice:  This list has 4 medication(s) that are the same as other medications prescribed for you. Read the directions carefully, and ask your doctor or other care provider to review them with you.

## 2017-06-07 NOTE — NURSING NOTE
"Chief Complaint   Patient presents with     RECHECK     rocesia     Sinus Problem     pt has been really stuffed up since 4/12 wants to now try the nasal spray       Initial /80 (BP Location: Right arm, Patient Position: Chair, Cuff Size: Adult Large)  Pulse 59  Temp 97.4  F (36.3  C) (Tympanic)  Wt 213 lb (96.6 kg)  SpO2 98%  BMI 30.56 kg/m2 Estimated body mass index is 30.56 kg/(m^2) as calculated from the following:    Height as of 3/22/17: 5' 10\" (1.778 m).    Weight as of this encounter: 213 lb (96.6 kg).  Medication Reconciliation: yasmin Grimm CMA       "

## 2017-06-16 ENCOUNTER — OFFICE VISIT (OUTPATIENT)
Dept: FAMILY MEDICINE | Facility: CLINIC | Age: 72
End: 2017-06-16
Payer: COMMERCIAL

## 2017-06-16 VITALS
WEIGHT: 212 LBS | DIASTOLIC BLOOD PRESSURE: 70 MMHG | BODY MASS INDEX: 30.35 KG/M2 | HEIGHT: 70 IN | HEART RATE: 68 BPM | SYSTOLIC BLOOD PRESSURE: 139 MMHG | RESPIRATION RATE: 16 BRPM | TEMPERATURE: 98.3 F

## 2017-06-16 DIAGNOSIS — H10.31 ACUTE BACTERIAL CONJUNCTIVITIS OF RIGHT EYE: ICD-10-CM

## 2017-06-16 DIAGNOSIS — H10.11 ALLERGIC CONJUNCTIVITIS, RIGHT: Primary | ICD-10-CM

## 2017-06-16 PROCEDURE — 99213 OFFICE O/P EST LOW 20 MIN: CPT | Performed by: NURSE PRACTITIONER

## 2017-06-16 RX ORDER — CIPROFLOXACIN HYDROCHLORIDE 3.5 MG/ML
SOLUTION/ DROPS TOPICAL
Qty: 1 BOTTLE | Refills: 0 | Status: SHIPPED | OUTPATIENT
Start: 2017-06-16 | End: 2018-06-17

## 2017-06-16 NOTE — PROGRESS NOTES
SUBJECTIVE:                                                    Remington Gutierrez is a 71 year old male who presents to clinic today for the following health issues:  He is unsure what he takes for medications as his daughter set them up for him.    Eye(s) Problem      Duration: Wed started    Description:  Location: right  Pain: YES  Redness: YES  Discharge: YES    Accompanying signs and symptoms: [facility] and Thursday he woke up with red irritated eye.    History (Trauma, foreign body exposure,): see above.    Precipitating or alleviating factors (contact use): None    Therapies tried and outcome: None           Problem list and histories reviewed & adjusted, as indicated.  Additional history: he was at [facility]. He does not feel they did anything unusual at the exam which he was told was fine, no findings of concern.  He states the next day his right eye became red, puffy, and irritated. He has light sensitivity. He states he mowed grass but denies any known exposure to a foreign body.  His vision is fine other than a bit blurry from the swelling. He states his nose has been running as well. No other specific concerns. No URI complaints.         Patient Active Problem List   Diagnosis     Sensorineural Hearing Loss, Bilateral     Dysthymic disorder     Obstructive sleep apnea     Osteoarthritis     Obesity     Memory loss     Backache     HYPERLIPIDEMIA LDL GOAL <100     Advanced directives, counseling/discussion     Type 2 diabetes mellitus without complication, without long-term current use of insulin (H)     Past Surgical History:   Procedure Laterality Date     COLONOSCOPY  7/2/03     ORTHOPEDIC SURGERY       SURGICAL HISTORY OF -   2004    Arthroscopy (L) Knee      SURGICAL HISTORY OF -   9/08    left TKT     SURGICAL HISTORY OF -   10/08    Right THR       Social History   Substance Use Topics     Smoking status: Current  Every Day Smoker     Packs/day: 1.00     Years: 25.00     Types: Cigarettes     Last attempt to quit: 1/31/2005     Smokeless tobacco: Never Used     Alcohol use Yes      Comment: very little     Family History   Problem Relation Age of Onset     Respiratory Mother      copd     HEART DISEASE Mother      Cardiovascular Mother 75     MI     Coronary Artery Disease Mother      CANCER Father      kidney     Arthritis Brother      Cardiovascular Brother      Chronic Obstructive Pulmonary Disease Brother      Allergies Sister      Coronary Artery Disease Maternal Grandmother      Family History Negative No family hx of          Current Outpatient Prescriptions   Medication Sig Dispense Refill     ciprofloxacin (CILOXAN) 0.3 % ophthalmic solution Instill 1-2 drops in the affected eye(s) every 2 hours while awake for 2 days then 1-2 drops every 4 hours while awake for the next 5 days. 1 Bottle 0     metroNIDAZOLE (METROCREAM) 0.75 % cream Apply topically once daily. 45 g 3     lisinopril (PRINIVIL/ZESTRIL) 10 MG tablet Take 1 tablet (10 mg) by mouth daily 90 tablet 3     metFORMIN (GLUCOPHAGE) 500 MG tablet 1 tabs with breakfast and 1 tablet evening meal. 180 tablet 3     tadalafil (CIALIS) 5 MG tablet Take 0.5 tablets (2.5 mg) by mouth daily Never use with nitroglycerin, terazosin or doxazosin. 60 tablet 11     order for DME Equipment being ordered:     Touch 1 glucose meter 1 Device 0     triamcinolone (KENALOG) 0.1 % cream Apply sparingly to affected area three times daily as needed 80 g 0     zolpidem (AMBIEN CR) 12.5 MG CR tablet Take 1 tablet (12.5 mg) by mouth nightly as needed for sleep 30 tablet 5     simvastatin (ZOCOR) 20 MG tablet Take 1 tablet (20 mg) by mouth daily 90 tablet 3     ibuprofen (ADVIL,MOTRIN) 800 MG tablet Take 1 tablet (800 mg) by mouth every 8 hours as needed for moderate pain 90 tablet 11     Melatonin 10 MG TABS Take 10 mg by mouth At Bedtime       multivitamin, therapeutic with minerals  "(MULTI-VITAMIN) TABS Take 1 tablet by mouth daily       ALPRAZolam (XANAX) 0.5 MG tablet        triamcinolone (KENALOG) 0.1 % cream Apply sparingly to affected area three times daily as needed 80 g 1     ORDER FOR DME CPAP 5-10 cm H20 with heated humidity and a Quattro FFM       Bioflavonoid Products (VITAMIN C PLUS) 1000 MG TABS Take  by mouth.       ORDER FOR DME all related CPAP supplies: mask, headgear, tubing, water chamber, filters   1 Device 2     aspirin 81 MG TABS take 1 Tab by mouth.       cholecalciferol (VITAMIN D) 400 UNIT TABS take 400 Units by mouth daily. (one tablet)       fluticasone (FLONASE) 50 MCG/ACT spray Spray 2 sprays into both nostrils daily (Patient not taking: Reported on 6/16/2017) 1 Bottle 11     blood glucose monitoring (NO BRAND SPECIFIED) test strip Use to test blood sugars 3 times daily or as directed (Patient not taking: Reported on 6/16/2017) 1 Box 11     blood glucose (NO BRAND SPECIFIED) lancets standard Use to test blood sugar 3 times daily or as directed. (Patient not taking: Reported on 6/16/2017) 1 Box 0     blood glucose monitoring (NO BRAND SPECIFIED) meter device kit Use to test blood sugar 3 times daily or as directed. (Patient not taking: Reported on 6/16/2017) 1 kit 0     blood glucose monitoring (ONE TOUCH ULTRASOFT) lancets 1 each See Admin Instructions Use to test blood sugar 3 times daily or as directed. (Patient not taking: Reported on 6/16/2017) 1 Box 5     Cyanocobalamin (VITAMIN B 12 PO) Take  by mouth.       Allergies   Allergen Reactions     Neomycin Rash       Reviewed and updated as needed this visit by clinical staff  Tobacco  Allergies  Med Hx  Surg Hx  Fam Hx  Soc Hx      Reviewed and updated as needed this visit by Provider          ROS: 10 point ROS neg other than the symptoms noted above in the HPI.    OBJECTIVE:                                                    /70  Pulse 68  Temp 98.3  F (36.8  C) (Oral)  Resp 16  Ht 5' 10\" (1.778 m) "  Wt 212 lb (96.2 kg)  BMI 30.42 kg/m2  Body mass index is 30.42 kg/(m^2).  GENERAL: healthy, alert and no distress  HENT: PERRL, right eye is injected with watery discharge, light sensitive, lower eyelid mild swelling, mild pain over eye, no foreign bodies identified, pharynx without erythema  NECK: no adenopathy, no asymmetry  RESP: lungs clear to auscultation - no rales, rhonchi or wheezes  CV: regular rate and rhythm, normal S1 S2  MS: no gross musculoskeletal defects noted      Diagnostic Test Results:  none      ASSESSMENT/PLAN:                                                            1. Allergic conjunctivitis, right    Will treat this for both a possible allergic reaction as well as possible corneal abrasion from a foreign body. He states he will continue to monitor and if no improvement, he will seek emergent care.    2. Acute bacterial conjunctivitis of right eye    - ciprofloxacin (CILOXAN) 0.3 % ophthalmic solution; Instill 1-2 drops in the affected eye(s) every 2 hours while awake for 2 days then 1-2 drops every 4 hours while awake for the next 5 days.  Dispense: 1 Bottle; Refill: 0  Discussed how to take the medication(s), expected outcomes, potential side effects.      See Patient Instructions  Follow up if symptoms persist or worsen and as needed.    Patient Instructions   Start the eye drops now.  Take 50 mg benadryl now and may repeat at bedtime again if eye is still red and swollen.  If this doesn't respond quickly, meaning if you don't improve or worsen, seek emergent care.        Thank you for choosing [facility].  You may be receiving a survey in the mail from [facility] [employer] regarding your visit today.  Please take a few minutes to complete and return the survey to let us know how we are doing.      Our Clinic hours are:  Mondays    7:20 am - 7 pm  Tues -  Fri  7:20 am - 5 pm    Clinic Phone: [phone]    The clinic lab opens at 7:30 am Mon - Fri and appointments are  required.    [facility]  Ph. [phone]  Monday-Thursday 8 am - 7pm  Tues/Wed/Fri 8 am - 5:30 pm             BLANCO Reynoso [facility]

## 2017-06-16 NOTE — NURSING NOTE
"Chief Complaint   Patient presents with     Eye Problem       Initial /81 (BP Location: Right arm, Patient Position: Chair, Cuff Size: Adult Regular)  Pulse 68  Temp 98.3  F (36.8  C) (Oral)  Resp 16  Ht 5' 10\" (1.778 m)  Wt 212 lb (96.2 kg)  BMI 30.42 kg/m2 Estimated body mass index is 30.42 kg/(m^2) as calculated from the following:    Height as of this encounter: 5' 10\" (1.778 m).    Weight as of this encounter: 212 lb (96.2 kg).  Medication Reconciliation: complete  "

## 2017-06-16 NOTE — MR AVS SNAPSHOT
After Visit Summary   6/16/2017    Remington Gutierrez    MRN: [id number]           Patient Information     Date Of Birth          1945        Visit Information        Provider Department      6/16/2017 1:40 PM Laura Gregory APRN CNP [facility]        Today's Diagnoses     Allergic conjunctivitis, right    -  1    Acute bacterial conjunctivitis of right eye          Care Instructions    Start the eye drops now.  Take 50 mg benadryl now and may repeat at bedtime again if eye is still red and swollen.  If this doesn't respond quickly, meaning if you don't improve or worsen, seek emergent care.        Thank you for choosing [facility].  You may be receiving a survey in the mail from [employer] regarding your visit today.  Please take a few minutes to complete and return the survey to let us know how we are doing.      Our Clinic hours are:  Mondays    7:20 am - 7 pm  Tues -  Fri  7:20 am - 5 pm    Clinic Phone: [phone]    The clinic lab opens at 7:30 am Mon - Fri and appointments are required.    [facility]  Ph. [phone]  Monday-Thursday 8 am - 7pm  Tues/Wed/Fri 8 am - 5:30 pm                 Follow-ups after your visit        Who to contact     If you have questions or need follow up information about today's clinic visit or your schedule please contact [facility] directly at [phone].  Normal or non-critical lab and imaging results will be communicated to you by MyChart, letter or phone within 4 business days after the clinic has received the results. If you do not hear from us within 7 days, please contact the clinic through MyChart or phone. If you have a critical or abnormal lab result, we will notify you by phone as soon as possible.  Submit refill requests through [employer] or call your pharmacy and they will forward the refill request to us. Please allow 3 business days for your refill to be completed.        "   Additional Information About Your Visit        MyChart Information     [employer] lets you send messages to your doctor, view your test results, renew your prescriptions, schedule appointments and more. To sign up, go to [email or web address] . Click on \"Log in\" on the left side of the screen, which will take you to the Welcome page. Then click on \"Sign up Now\" on the right side of the page.     You will be asked to enter the access code listed below, as well as some personal information. Please follow the directions to create your username and password.     Your access code is: [email or web address]  Expires: 2017 11:36 AM     Your access code will  in 90 days. If you need help or a new code, please call your [place] clinic or [phone].        Care EveryWhere ID     This is your Care EveryWhere ID. This could be used by other organizations to access your [place] medical records  [id number]        Your Vitals Were     Pulse Temperature Respirations Height BMI (Body Mass Index)       68 98.3  F (36.8  C) (Oral) 16 5' 10\" (1.778 m) 30.42 kg/m2        Blood Pressure from Last 3 Encounters:   17 139/70   17 130/80   17 140/75    Weight from Last 3 Encounters:   17 212 lb (96.2 kg)   17 213 lb (96.6 kg)   17 217 lb (98.4 kg)              Today, you had the following     No orders found for display         Today's Medication Changes          These changes are accurate as of: 17  2:12 PM.  If you have any questions, ask your nurse or doctor.               Start taking these medicines.        Dose/Directions    ciprofloxacin 0.3 % ophthalmic solution   Commonly known as:  CILOXAN   Used for:  Acute bacterial conjunctivitis of right eye   Started by:  Laura Gregory APRN CNP        Instill 1-2 drops in the affected eye(s) every 2 hours while awake for 2 days then 1-2 drops every 4 hours while awake for the next 5 days.   Quantity:  1 Bottle   Refills:  0          "   Where to get your medicines      These [place] [facility] [place] [address] [facility] [address]  [address] [facility] [address]     Phone:  [phone]     ciprofloxacin 0.3 % ophthalmic solution                Primary Care Provider Office Phone # Fax #    Jake Remington Vega [phone]       [facility] [address] [facility] [address]        Thank you!     Thank you for choosing [facility]  for your care. Our goal is always to provide you with excellent care. Hearing back from our patients is one way we can continue to improve our services. Please take a few minutes to complete the written survey that you may receive in the mail after your visit with us. Thank you!             Your Updated Medication List - Protect others around you: Learn how to safely use, store and throw away your medicines at www.disposemymeds.org.          This list is accurate as of: 6/16/17  2:12 PM.  Always use your most recent med list.                   Brand Name Dispense Instructions for use    ALPRAZolam 0.5 MG tablet    XANAX         aspirin 81 MG tablet      take 1 Tab by mouth.       blood glucose lancets standard    no brand specified    1 Box    Use to test blood sugar 3 times daily or as directed.       blood glucose monitoring lancets     1 Box    1 each See Admin Instructions Use to test blood sugar 3 times daily or as directed.       blood glucose monitoring meter device kit    no brand specified    1 kit    Use to test blood sugar 3 times daily or as directed.       blood glucose monitoring test strip    no brand specified    1 Box    Use to test blood sugars 3 times daily or as directed       cholecalciferol 400 UNIT Tabs tablet    vitamin D     take 400 Units by mouth daily. (one tablet)       ciprofloxacin 0.3 % ophthalmic solution    CILOXAN    1 Bottle    Instill 1-2 drops in the affected eye(s)  every 2 hours while awake for 2 days then 1-2 drops every 4 hours while awake for the next 5 days.       fluticasone 50 MCG/ACT spray    FLONASE    1 Bottle    Spray 2 sprays into both nostrils daily       ibuprofen 800 MG tablet    ADVIL/MOTRIN    90 tablet    Take 1 tablet (800 mg) by mouth every 8 hours as needed for moderate pain       lisinopril 10 MG tablet    PRINIVIL/ZESTRIL    90 tablet    Take 1 tablet (10 mg) by mouth daily       Melatonin 10 MG Tabs tablet      Take 10 mg by mouth At Bedtime       metFORMIN 500 MG tablet    GLUCOPHAGE    180 tablet    1 tabs with breakfast and 1 tablet evening meal.       metroNIDAZOLE 0.75 % cream    METROCREAM    45 g    Apply topically once daily.       Multi-vitamin Tabs tablet      Take 1 tablet by mouth daily       * order for DME      CPAP 5-10 cm H20 with heated humidity and a Quattro FFM       * order for DME     1 Device    all related CPAP supplies: mask, headgear, tubing, water chamber, filters       order for DME     1 Device    Equipment being ordered:   Touch 1 glucose meter       simvastatin 20 MG tablet    ZOCOR    90 tablet    Take 1 tablet (20 mg) by mouth daily       tadalafil 5 MG tablet    CIALIS    60 tablet    Take 0.5 tablets (2.5 mg) by mouth daily Never use with nitroglycerin, terazosin or doxazosin.       * triamcinolone 0.1 % cream    KENALOG    80 g    Apply sparingly to affected area three times daily as needed       * triamcinolone 0.1 % cream    KENALOG    80 g    Apply sparingly to affected area three times daily as needed       VITAMIN B 12 PO      Take  by mouth.       VITAMIN C PLUS 1000 MG Tabs      Take  by mouth.       zolpidem 12.5 MG CR tablet    AMBIEN CR    30 tablet    Take 1 tablet (12.5 mg) by mouth nightly as needed for sleep       * Notice:  This list has 4 medication(s) that are the same as other medications prescribed for you. Read the directions carefully, and ask your doctor or other care provider to review them with  you.

## 2017-06-16 NOTE — PATIENT INSTRUCTIONS
Start the eye drops now.  Take 50 mg benadryl now and may repeat at bedtime again if eye is still red and swollen.  If this doesn't respond quickly, meaning if you don't improve or worsen, seek emergent care.        Thank you for choosing [facility].  You may be receiving a survey in the mail from [facility] regarding your visit today.  Please take a few minutes to complete and return the survey to let us know how we are doing.      Our Clinic hours are:  Mondays    7:20 am - 7 pm  Tues -  Fri  7:20 am - 5 pm    Clinic Phone: [phone]    The clinic lab opens at 7:30 am Mon - Fri and appointments are required.    [facility]. [phone]  Monday-Thursday 8 am - 7pm  Tues/Wed/Fri 8 am - 5:30 pm

## 2018-01-05 ENCOUNTER — TELEPHONE (OUTPATIENT)
Dept: FAMILY MEDICINE | Facility: CLINIC | Age: 73
End: 2018-01-05

## 2018-01-05 ENCOUNTER — ALLIED HEALTH/NURSE VISIT (OUTPATIENT)
Dept: FAMILY MEDICINE | Facility: CLINIC | Age: 73
End: 2018-01-05
Payer: COMMERCIAL

## 2018-01-05 DIAGNOSIS — J20.9 ACUTE BRONCHITIS, UNSPECIFIED ORGANISM: Primary | ICD-10-CM

## 2018-01-05 DIAGNOSIS — T75.3XXA MOTION SICKNESS, INITIAL ENCOUNTER: ICD-10-CM

## 2018-01-05 DIAGNOSIS — J06.9 URI (UPPER RESPIRATORY INFECTION): Primary | ICD-10-CM

## 2018-01-05 PROCEDURE — 99207 ZZC NO CHARGE NURSE ONLY: CPT

## 2018-01-05 RX ORDER — AZITHROMYCIN 250 MG/1
TABLET, FILM COATED ORAL
Qty: 6 TABLET | Refills: 0 | Status: SHIPPED | OUTPATIENT
Start: 2018-01-05 | End: 2018-06-17

## 2018-01-05 RX ORDER — SCOLOPAMINE TRANSDERMAL SYSTEM 1 MG/1
PATCH, EXTENDED RELEASE TRANSDERMAL
Qty: 2 PATCH | Refills: 1 | Status: SHIPPED | OUTPATIENT
Start: 2018-01-05 | End: 2018-06-17

## 2018-01-05 NOTE — MR AVS SNAPSHOT
"              After Visit Summary   2018    Remington Gutierrez    MRN: [id number]           Patient Information     Date Of Birth          1945        Visit Information        Provider Department      2018 2:30 PM [place] CL RN [facility]        Today's Diagnoses     URI (upper respiratory infection)    -  1       Follow-ups after your visit        Who to contact     If you have questions or need follow up information about today's clinic visit or your schedule please contact [facility] directly at [phone].  Normal or non-critical lab and imaging results will be communicated to you by [employer], letter or phone within 4 business days after the clinic has received the results. If you do not hear from us within 7 days, please contact the clinic through [employer] or phone. If you have a critical or abnormal lab result, we will notify you by phone as soon as possible.  Submit refill requests through [employer] or call your pharmacy and they will forward the refill request to us. Please allow 3 business days for your refill to be completed.          Additional Information About Your Visit        MyChart Information     [employer] lets you send messages to your doctor, view your test results, renew your prescriptions, schedule appointments and more. To sign up, go to [email or web address] . Click on \"Log in\" on the left side of the screen, which will take you to the Welcome page. Then click on \"Sign up Now\" on the right side of the page.     You will be asked to enter the access code listed below, as well as some personal information. Please follow the directions to create your username and password.     Your access code is: [email or web address]  Expires: 2018  2:56 PM     Your access code will  in 90 days. If you need help or a new code, please call your [facility] or [phone].        Care EveryWhere ID     This is your Care EveryWhere ID. This could be used by " other organizations to access your [place] medical records  [id number]         Blood Pressure from Last 3 Encounters:   06/16/17 139/70   06/07/17 130/80   04/12/17 140/75    Weight from Last 3 Encounters:   06/16/17 212 lb (96.2 kg)   06/07/17 213 lb (96.6 kg)   04/12/17 217 lb (98.4 kg)              Today, you had the following     No orders found for display       Primary Care Provider Office Phone # Fax #    Jake Remington Vega [phone] [address] [facility] [address]        Equal Access to Services     [facility] : Hadii aad wilton hadasho Soshari, waaxda luqadaha, qaybta kaalmada adeegyada, daniella sanders . So [facility] [phone].    ATENCIÓN: Si habla español, tiene a armas disposición servicios gratuitos de asistencia lingüística. [facility] [phone].    We comply with applicable federal civil rights laws and [place] laws. We do not discriminate on the basis of race, color, national origin, age, disability, sex, sexual orientation, or gender identity.            Thank you!     Thank you for choosing [facility]  for your care. Our goal is always to provide you with excellent care. Hearing back from our patients is one way we can continue to improve our services. Please take a few minutes to complete the written survey that you may receive in the mail after your visit with us. Thank you!             Your Updated Medication List - Protect others around you: Learn how to safely use, store and throw away your medicines at www.disposemymeds.org.          This list is accurate as of: 1/5/18  2:56 PM.  Always use your most recent med list.                   Brand Name Dispense Instructions for use Diagnosis    ALPRAZolam 0.5 MG tablet    XANAX          aspirin 81 MG tablet      take 1 Tab by mouth.        blood glucose lancets standard    no brand specified    1 Box    Use to test blood sugar 3 times daily or as directed.    Type 2  diabetes mellitus without complication (H)       blood glucose monitoring lancets     1 Box    1 each See Admin Instructions Use to test blood sugar 3 times daily or as directed.    Type II or unspecified type diabetes mellitus without mention of complication, not stated as uncontrolled       blood glucose monitoring meter device kit    no brand specified    1 kit    Use to test blood sugar 3 times daily or as directed.        blood glucose monitoring test strip    no brand specified    1 Box    Use to test blood sugars 3 times daily or as directed    Type 2 diabetes mellitus without complication, without long-term current use of insulin (H)       cholecalciferol 400 UNIT Tabs tablet    vitamin D3     take 400 Units by mouth daily. (one tablet)        ciprofloxacin 0.3 % ophthalmic solution    CILOXAN    1 Bottle    Instill 1-2 drops in the affected eye(s) every 2 hours while awake for 2 days then 1-2 drops every 4 hours while awake for the next 5 days.    Acute bacterial conjunctivitis of right eye       fluticasone 50 MCG/ACT spray    FLONASE    1 Bottle    Spray 2 sprays into both nostrils daily    Chronic rhinitis       ibuprofen 800 MG tablet    ADVIL/MOTRIN    90 tablet    Take 1 tablet (800 mg) by mouth every 8 hours as needed for moderate pain    Chronic right shoulder pain       lisinopril 10 MG tablet    PRINIVIL/ZESTRIL    90 tablet    Take 1 tablet (10 mg) by mouth daily    Type 2 diabetes mellitus without complication, without long-term current use of insulin (H)       Melatonin 10 MG Tabs tablet      Take 10 mg by mouth At Bedtime        metFORMIN 500 MG tablet    GLUCOPHAGE    180 tablet    1 tabs with breakfast and 1 tablet evening meal.    Type 2 diabetes mellitus without complication, without long-term current use of insulin (H)       metroNIDAZOLE 0.75 % cream    METROCREAM    45 g    Apply topically once daily.    Rosacea       Multi-vitamin Tabs tablet      Take 1 tablet by mouth daily        *  order for DME      CPAP 5-10 cm H20 with heated humidity and a Quattro FFM        * order for DME     1 Device    all related CPAP supplies: mask, headgear, tubing, water chamber, filters    Obstructive sleep apnea (adult) (pediatric)       order for DME     1 Device    Equipment being ordered:   Touch 1 glucose meter    Type 2 diabetes mellitus without complication, without long-term current use of insulin (H)       simvastatin 20 MG tablet    ZOCOR    90 tablet    Take 1 tablet (20 mg) by mouth daily    Type 2 diabetes mellitus without complication, without long-term current use of insulin (H)       tadalafil 5 MG tablet    CIALIS    60 tablet    Take 0.5 tablets (2.5 mg) by mouth daily Never use with nitroglycerin, terazosin or doxazosin.    Erectile dysfunction due to diseases classified elsewhere       * triamcinolone 0.1 % cream    KENALOG    80 g    Apply sparingly to affected area three times daily as needed    Contact dermatitis and other eczema, due to unspecified cause       * triamcinolone 0.1 % cream    KENALOG    80 g    Apply sparingly to affected area three times daily as needed    Eczema, unspecified type       VITAMIN B 12 PO      Take  by mouth.        VITAMIN C PLUS 1000 MG Tabs      Take  by mouth.        zolpidem 12.5 MG CR tablet    AMBIEN CR    30 tablet    Take 1 tablet (12.5 mg) by mouth nightly as needed for sleep    Insomnia, unspecified type       * Notice:  This list has 4 medication(s) that are the same as other medications prescribed for you. Read the directions carefully, and ask your doctor or other care provider to review them with you.

## 2018-01-05 NOTE — NURSING NOTE
Pt in clinic today after being turned away from [facility] due to scheduling error.  Pt with chest congestion, nasal congestion and cough.  Pt has not checked his temperature, but has had chills and sweats.  Pt leaving on Monday for [place] and is asking for medication to treat symptoms.    Pt also will be on a boat while gone and is asking if you will prescribe motion-sickness medication to take with him.    Any prescriptions to the [facility].     María MATIAS RN

## 2018-01-05 NOTE — TELEPHONE ENCOUNTER
Pt in clinic today after being turned away from [facility] due to scheduling error.  Pt with chest congestion, nasal congestion and cough.  Pt has not checked his temperature, but has had chills and sweats.  Pt leaving on Monday for [place] and is asking for medication to treat symptoms.    Pt also will be on a boat while gone and is asking if you will prescribe motion-sickness medication to take with him.    Any prescriptions to the [facility].     María MATIAS RN

## 2018-01-25 LAB
ALT SERPL-CCNC: 27 U/L (ref 13–61)
AST SERPL-CCNC: 17 U/L (ref 15–37)
CHOLEST SERPL-MCNC: 132 MG/DL (ref 0–200)
CREAT SERPL-MCNC: 1 MG/DL (ref 0.7–1.2)
GFR SERPL CREATININE-BSD FRML MDRD: >60 ML/MIN/1.73M2
GLUCOSE SERPL-MCNC: 104 MG/DL (ref 74–106)
HBA1C MFR BLD: 7 % (ref 4–6)
HDLC SERPL-MCNC: 47 MG/DL
LDLC SERPL CALC-MCNC: 39 MG/DL
NONHDLC SERPL-MCNC: 85 MG/DL
POTASSIUM SERPL-SCNC: 4.4 MMOL/L (ref 3.5–5)
TRIGL SERPL-MCNC: 229 MG/DL (ref 0–150)
TSH SERPL-ACNC: 2.15 UIU/ML (ref 0.3–5)

## 2018-06-06 ENCOUNTER — TELEPHONE (OUTPATIENT)
Dept: FAMILY MEDICINE | Facility: CLINIC | Age: 73
End: 2018-06-06

## 2018-06-17 ENCOUNTER — APPOINTMENT (OUTPATIENT)
Dept: CT IMAGING | Facility: CLINIC | Age: 73
End: 2018-06-17
Attending: EMERGENCY MEDICINE
Payer: MEDICARE

## 2018-06-17 ENCOUNTER — HOSPITAL ENCOUNTER (EMERGENCY)
Facility: CLINIC | Age: 73
Discharge: HOME | End: 2018-06-17
Attending: EMERGENCY MEDICINE | Admitting: EMERGENCY MEDICINE
Payer: MEDICARE

## 2018-06-17 VITALS
RESPIRATION RATE: 14 BRPM | SYSTOLIC BLOOD PRESSURE: 135 MMHG | HEART RATE: 54 BPM | DIASTOLIC BLOOD PRESSURE: 88 MMHG | OXYGEN SATURATION: 96 % | TEMPERATURE: 97.6 F

## 2018-06-17 DIAGNOSIS — R42 VERTIGO: ICD-10-CM

## 2018-06-17 DIAGNOSIS — R42 DIZZINESS: ICD-10-CM

## 2018-06-17 LAB
ALBUMIN SERPL-MCNC: 4.3 G/DL (ref 3.4–5)
ALBUMIN UR-MCNC: NEGATIVE MG/DL
ALP SERPL-CCNC: 72 U/L (ref 40–150)
ALT SERPL W P-5'-P-CCNC: 26 U/L (ref 0–70)
ANION GAP SERPL CALCULATED.3IONS-SCNC: 7 MMOL/L (ref 3–14)
APPEARANCE UR: CLEAR
AST SERPL W P-5'-P-CCNC: 15 U/L (ref 0–45)
BASOPHILS # BLD AUTO: 0.1 10E9/L (ref 0–0.2)
BASOPHILS NFR BLD AUTO: 0.7 %
BILIRUB SERPL-MCNC: 0.5 MG/DL (ref 0.2–1.3)
BILIRUB UR QL STRIP: NEGATIVE
BUN SERPL-MCNC: 23 MG/DL (ref 7–30)
CALCIUM SERPL-MCNC: 9.2 MG/DL (ref 8.5–10.1)
CHLORIDE SERPL-SCNC: 107 MMOL/L (ref 94–109)
CO2 SERPL-SCNC: 25 MMOL/L (ref 20–32)
COLOR UR AUTO: YELLOW
CREAT SERPL-MCNC: 1.1 MG/DL (ref 0.66–1.25)
DIFFERENTIAL METHOD BLD: NORMAL
EOSINOPHIL # BLD AUTO: 0.7 10E9/L (ref 0–0.7)
EOSINOPHIL NFR BLD AUTO: 8.1 %
ERYTHROCYTE [DISTWIDTH] IN BLOOD BY AUTOMATED COUNT: 12.2 % (ref 10–15)
GFR SERPL CREATININE-BSD FRML MDRD: 65 ML/MIN/1.7M2
GLUCOSE SERPL-MCNC: 108 MG/DL (ref 70–99)
GLUCOSE UR STRIP-MCNC: NEGATIVE MG/DL
HCT VFR BLD AUTO: 45 % (ref 40–53)
HGB BLD-MCNC: 14.9 G/DL (ref 13.3–17.7)
HGB UR QL STRIP: NEGATIVE
IMM GRANULOCYTES # BLD: 0 10E9/L (ref 0–0.4)
IMM GRANULOCYTES NFR BLD: 0.2 %
KETONES UR STRIP-MCNC: NEGATIVE MG/DL
LEUKOCYTE ESTERASE UR QL STRIP: NEGATIVE
LYMPHOCYTES # BLD AUTO: 1.4 10E9/L (ref 0.8–5.3)
LYMPHOCYTES NFR BLD AUTO: 15.1 %
MCH RBC QN AUTO: 32.8 PG (ref 26.5–33)
MCHC RBC AUTO-ENTMCNC: 33.1 G/DL (ref 31.5–36.5)
MCV RBC AUTO: 99 FL (ref 78–100)
MONOCYTES # BLD AUTO: 0.6 10E9/L (ref 0–1.3)
MONOCYTES NFR BLD AUTO: 6.7 %
MUCOUS THREADS #/AREA URNS LPF: PRESENT /LPF
NEUTROPHILS # BLD AUTO: 6.4 10E9/L (ref 1.6–8.3)
NEUTROPHILS NFR BLD AUTO: 69.2 %
NITRATE UR QL: NEGATIVE
NRBC # BLD AUTO: 0 10*3/UL
NRBC BLD AUTO-RTO: 0 /100
PH UR STRIP: 5 PH (ref 5–7)
PLATELET # BLD AUTO: 186 10E9/L (ref 150–450)
POTASSIUM SERPL-SCNC: 5 MMOL/L (ref 3.4–5.3)
PROT SERPL-MCNC: 7.6 G/DL (ref 6.8–8.8)
RBC # BLD AUTO: 4.54 10E12/L (ref 4.4–5.9)
RBC #/AREA URNS AUTO: 1 /HPF (ref 0–2)
SODIUM SERPL-SCNC: 139 MMOL/L (ref 133–144)
SOURCE: ABNORMAL
SP GR UR STRIP: 1.02 (ref 1–1.03)
SQUAMOUS #/AREA URNS AUTO: <1 /HPF (ref 0–1)
TROPONIN I SERPL-MCNC: <0.015 UG/L (ref 0–0.04)
TROPONIN I SERPL-MCNC: <0.015 UG/L (ref 0–0.04)
UROBILINOGEN UR STRIP-MCNC: 0 MG/DL (ref 0–2)
WBC # BLD AUTO: 9.2 10E9/L (ref 4–11)
WBC #/AREA URNS AUTO: 2 /HPF (ref 0–5)

## 2018-06-17 PROCEDURE — 93005 ELECTROCARDIOGRAM TRACING: CPT

## 2018-06-17 PROCEDURE — 93010 ELECTROCARDIOGRAM REPORT: CPT | Mod: Z6 | Performed by: EMERGENCY MEDICINE

## 2018-06-17 PROCEDURE — 99285 EMERGENCY DEPT VISIT HI MDM: CPT | Mod: 25 | Performed by: EMERGENCY MEDICINE

## 2018-06-17 PROCEDURE — 25000128 H RX IP 250 OP 636: Performed by: EMERGENCY MEDICINE

## 2018-06-17 PROCEDURE — 99285 EMERGENCY DEPT VISIT HI MDM: CPT | Mod: 25

## 2018-06-17 PROCEDURE — 70450 CT HEAD/BRAIN W/O DYE: CPT | Mod: XS

## 2018-06-17 PROCEDURE — 96360 HYDRATION IV INFUSION INIT: CPT

## 2018-06-17 PROCEDURE — 96361 HYDRATE IV INFUSION ADD-ON: CPT

## 2018-06-17 PROCEDURE — 80053 COMPREHEN METABOLIC PANEL: CPT | Performed by: EMERGENCY MEDICINE

## 2018-06-17 PROCEDURE — 84484 ASSAY OF TROPONIN QUANT: CPT | Mod: 91 | Performed by: EMERGENCY MEDICINE

## 2018-06-17 PROCEDURE — 25000125 ZZHC RX 250: Performed by: EMERGENCY MEDICINE

## 2018-06-17 PROCEDURE — 25000131 ZZH RX MED GY IP 250 OP 636 PS 637: Mod: GY | Performed by: EMERGENCY MEDICINE

## 2018-06-17 PROCEDURE — 85025 COMPLETE CBC W/AUTO DIFF WBC: CPT | Performed by: EMERGENCY MEDICINE

## 2018-06-17 PROCEDURE — 81003 URINALYSIS AUTO W/O SCOPE: CPT | Performed by: EMERGENCY MEDICINE

## 2018-06-17 PROCEDURE — A9270 NON-COVERED ITEM OR SERVICE: HCPCS | Mod: GY | Performed by: EMERGENCY MEDICINE

## 2018-06-17 PROCEDURE — 70496 CT ANGIOGRAPHY HEAD: CPT

## 2018-06-17 RX ORDER — IOPAMIDOL 755 MG/ML
70 INJECTION, SOLUTION INTRAVASCULAR ONCE
Status: COMPLETED | OUTPATIENT
Start: 2018-06-17 | End: 2018-06-17

## 2018-06-17 RX ORDER — SODIUM CHLORIDE 9 MG/ML
1000 INJECTION, SOLUTION INTRAVENOUS CONTINUOUS
Status: DISCONTINUED | OUTPATIENT
Start: 2018-06-17 | End: 2018-06-17 | Stop reason: HOSPADM

## 2018-06-17 RX ORDER — MECLIZINE HYDROCHLORIDE 25 MG/1
25 TABLET ORAL ONCE
Status: COMPLETED | OUTPATIENT
Start: 2018-06-17 | End: 2018-06-17

## 2018-06-17 RX ORDER — MECLIZINE HCL 12.5 MG 12.5 MG/1
12.5 TABLET ORAL 4 TIMES DAILY PRN
Qty: 12 TABLET | Refills: 0 | Status: ON HOLD | OUTPATIENT
Start: 2018-06-17 | End: 2021-10-09

## 2018-06-17 RX ADMIN — SODIUM CHLORIDE 500 ML: 9 INJECTION, SOLUTION INTRAVENOUS at 17:57

## 2018-06-17 RX ADMIN — IOPAMIDOL 70 ML: 755 INJECTION, SOLUTION INTRAVENOUS at 18:46

## 2018-06-17 RX ADMIN — MECLIZINE 25 MG: 25 TABLET ORAL at 17:57

## 2018-06-17 RX ADMIN — SODIUM CHLORIDE 100 ML: 9 INJECTION, SOLUTION INTRAVENOUS at 18:46

## 2018-06-17 ASSESSMENT — ENCOUNTER SYMPTOMS
FATIGUE: 1
DIAPHORESIS: 1
SHORTNESS OF BREATH: 0
NAUSEA: 1
LIGHT-HEADEDNESS: 1
DIZZINESS: 1

## 2018-06-17 NOTE — ED PROVIDER NOTES
"  History     Chief Complaint   Patient presents with     Dizziness     vertigo, nausea, diaphoretic     HPI  Remington Gutierrez is a 72 year old male with a history of type II diabetes mellitus, backache, memory loss, and dysthymic disorder who presents to the ED for evaluation of vertigo. The patient states that while bending over to put his pants on earlier this evening he experienced a \"neck crack\" followed by a sudden onset of vertigo and nausea. He leaned against a dresser for 5-8 minutes with no relief of dizziness and attempted to vomit but was unable to. His wife adds that he was \"dripping with sweat\" during this time. The patient notes that he has been feeling fatigued recently. He reports that he is currently still experiencing mild dizziness, which is not aggravated by head movement. The patient denies chest pain, shortness of breath, or appetite change. He also denies any head trauma, recent illness, or new medications. He regularly takes lisinopril, metformin, Cialis, Zocor, and Ambien. Of note, the patient states that he has experienced episodes of dizziness in the past, which resolved with sitting.      Problem List:    Patient Active Problem List    Diagnosis Date Noted     Type 2 diabetes mellitus without complication, without long-term current use of insulin (H) 03/22/2017     Priority: Medium     Advanced directives, counseling/discussion 01/21/2013     Priority: Medium     Patient has completed an Advance/Health Care Directive (HCD), scanned into Epic Media tab, entry date of 10/24/2008.    Kaitlni Givens  January 21, 2013         HYPERLIPIDEMIA LDL GOAL <100 10/31/2010     Priority: Medium     Backache 03/12/2009     Priority: Medium     Problem list name updated by automated process. Provider to review       Obstructive sleep apnea      Priority: Medium     Dx 2004: AHI 24.8, PLMAI 9.8, PLMI swelling of the ankles arousal 73.8. CPAP 10cm. Sleep study 11/08- Sleep latency 8 minutes with " Ambien.  REM achieved.   REM latency 278 minutes.  Sleep efficiency 87%. Sleep architecture:  Stage 1, 29.8% (5%), stage 2, 61% (45-55%), stage 3, 0% (15-20%), stage REM, 8.5% (20-25%).  AHI was 10.8, with mild desaturations down to 88%.  REM RDI 60.8, consistent with excessive  REM BENNY.  Supine RDI 57.5, consistent with excessive  SUPINE BENNY.  Periodic Limb Movement Index 101.5/hour.  PLMAI 15.5.   Problem list name updated by automated process. Provider to review       Sensorineural Hearing Loss, Bilateral 06/28/2006     Priority: Medium     Obesity 11/20/2008     Priority: Low     Osteoarthritis      Priority: Low     Osteoarthritis  Problem list name updated by automated process. Provider to review       Memory loss      Priority: Low     Aricept started 2006       Dysthymic disorder 09/12/2006     Priority: Low     Formerly followed by Dr. Champion, [facility]. Now Dr. Kaufman (Last name unknown) will be taking over.          Past Medical History:    Past Medical History:   Diagnosis Date     BACKACHE NOS 7/12/2007       Past Surgical History:    Past Surgical History:   Procedure Laterality Date     COLONOSCOPY  7/2/03     ORTHOPEDIC SURGERY       SURGICAL HISTORY OF -   2004    Arthroscopy (L) Knee      SURGICAL HISTORY OF -   9/08    left TKT     SURGICAL HISTORY OF -   10/08    Right THR       Family History:    Family History   Problem Relation Age of Onset     Respiratory Mother      copd     HEART DISEASE Mother      Cardiovascular Mother 75     MI     Coronary Artery Disease Mother      CANCER Father      kidney     Arthritis Brother      Cardiovascular Brother      Chronic Obstructive Pulmonary Disease Brother      Allergies Sister      Coronary Artery Disease Maternal Grandmother      Family History Negative No family hx of        Social History:  Marital Status:   [5]  Social History   Substance Use Topics     Smoking status: Current Every Day Smoker      Packs/day: 1.00     Years: 25.00     Types: Cigarettes     Last attempt to quit: 1/31/2005     Smokeless tobacco: Never Used     Alcohol use Yes      Comment: very little        Medications:      ALPRAZolam (XANAX) 0.5 MG tablet   aspirin 81 MG TABS   azithromycin (ZITHROMAX Z-TATO) 250 MG tablet   Bioflavonoid Products (VITAMIN C PLUS) 1000 MG TABS   blood glucose (NO BRAND SPECIFIED) lancets standard   blood glucose monitoring (NO BRAND SPECIFIED) meter device kit   blood glucose monitoring (NO BRAND SPECIFIED) test strip   blood glucose monitoring (ONE TOUCH ULTRASOFT) lancets   cholecalciferol (VITAMIN D) 400 UNIT TABS   ciprofloxacin (CILOXAN) 0.3 % ophthalmic solution   Cyanocobalamin (VITAMIN B 12 PO)   fluticasone (FLONASE) 50 MCG/ACT spray   ibuprofen (ADVIL,MOTRIN) 800 MG tablet   lisinopril (PRINIVIL/ZESTRIL) 10 MG tablet   Melatonin 10 MG TABS   metFORMIN (GLUCOPHAGE) 500 MG tablet   metroNIDAZOLE (METROCREAM) 0.75 % cream   multivitamin, therapeutic with minerals (MULTI-VITAMIN) TABS   order for DME   ORDER FOR DME   ORDER FOR DME   scopolamine (TRANSDERM) 72 hr patch   simvastatin (ZOCOR) 20 MG tablet   tadalafil (CIALIS) 5 MG tablet   triamcinolone (KENALOG) 0.1 % cream   triamcinolone (KENALOG) 0.1 % cream   zolpidem (AMBIEN CR) 12.5 MG CR tablet         Review of Systems   Constitutional: Positive for activity change, diaphoresis and fatigue. Negative for appetite change and chills.   HENT: Negative for congestion and trouble swallowing.    Respiratory: Negative for shortness of breath.    Cardiovascular: Negative for chest pain.   Gastrointestinal: Positive for nausea. Negative for abdominal pain and vomiting.   Genitourinary: Negative for decreased urine volume and dysuria.   Musculoskeletal: Negative for back pain and neck pain.   Skin: Negative for rash.   Neurological: Positive for dizziness and light-headedness. Negative for seizures, syncope, speech difficulty, weakness, numbness and  headaches.   Hematological: Does not bruise/bleed easily.   Psychiatric/Behavioral: Negative for confusion.       Physical Exam   BP: (!) 137/116  Pulse: 54  Temp: 97.6  F (36.4  C)  Resp: 20  SpO2: 100 %      Physical Exam   Constitutional: He appears well-developed and well-nourished. No distress.   Eyes: Conjunctivae and EOM are normal. Pupils are equal, round, and reactive to light.   Mild lateral nystagmus L >R.    Psychiatric: He has a normal mood and affect.   Nursing note and vitals reviewed.      HENT: TMs clear. Oral mucosa moist. No lesions. Posterior pharynx with no erythema or edema.  Neck: Supple.  Pulmonary/Chest: Lungs are clear to auscultation bilaterally.  Cardiovascular: Heart is regular rate and rhythm. No murmur.  Abdomen: Soft, non-distended, non-tender.   Musculoskeletal: Moving all extremities well. No peripheral edema.   Neurological: Alert. No focal neurologic deficit. Cranial nerves intact. No drift in any extremity. Salazar-Scurry maneuver is equivocal.   Skin: No rash.    ED Course     ED Course     Procedures               EKG Interpretation:      Interpreted by Lonny Whitman  Rhythm: normal sinus   Rate: Normal  Axis: Left Axis Deviation  Ectopy: none  Conduction:first degree av block,  left anterior fasciclar block  ST Segments/ T Waves: Non-specific ST-T wave changes  Q Waves: none  Comparison to prior: Unchanged from 3/20/13    Clinical Impression: no acute changes                Critical Care time:  none               Results for orders placed or performed during the hospital encounter of 06/17/18   Head CT w/o contrast    Narrative    CT OF THE HEAD WITHOUT CONTRAST  6/17/2018 7:07 PM     COMPARISON: Brain MRI 3/18/2011.    HISTORY: Dizziness.     TECHNIQUE: Axial CT images of the head from the skull base to the  vertex were acquired without IV contrast.    FINDINGS: The ventricles and basal cisterns are within normal limits  in configuration. There is no midline shift. There are  no extra-axial  fluid collections. Gray-white differentiation is well maintained.    No intracranial hemorrhage, mass or recent infarct.    The visualized paranasal sinuses are well-aerated. There is no  mastoiditis. There are no fractures of the visualized bones.      Impression    IMPRESSION: Normal head CT.    Radiation dose for this scan was reduced using automated exposure  control, adjustment of the mA and/or kV according to patient size, or  iterative reconstruction technique    DIMAS SILVERIO MD   CT Head Neck Angio w/o & w Contrast    Narrative    CT ANGIOGRAM OF THE HEAD AND NECK WITHOUT AND WITH CONTRAST  6/17/2018  7:13 PM     COMPARISON: None.    HISTORY: Dizziness.    TECHNIQUE: Precontrast localizing scans were followed by CT  angiography with an injection of 70 mL Isovue 370 nonionic intravenous  contrast material with scans through the head and neck.  Images were  transferred to a separate 3-D workstation where multiplanar  reformations and 3-D images were created.  Estimates of carotid  stenoses are made relative to the distal internal carotid artery  diameters except as noted.      FINDINGS:   Neck CTA: The common carotid arteries bilaterally are patent without  stenosis. There is mild atherosclerotic narrowing (less than 50%  luminal diameter stenosis) at the origins of the internal carotid  arteries on both sides. The cervical internal carotid arteries  bilaterally are tortuous but are otherwise patent with no additional  areas of narrowing. The dominant right and tiny left vertebral  arteries are patent without stenosis.    Head CTA: The basilar, bilateral distal internal carotid, bilateral  anterior cerebral, bilateral middle cerebral and bilateral posterior  cerebral arteries are patent and unremarkable. The anterior  communicating and bilateral posterior communicating arteries are  patent and unremarkable.      Impression    IMPRESSION: Mild atherosclerotic narrowing (less than 50%  luminal  diameter stenosis) at the origins of the internal carotid arteries on  both sides. Otherwise, normal neck and head CTA.      Radiation dose for this scan was reduced using automated exposure  control, adjustment of the mA and/or kV according to patient size, or  iterative reconstruction technique    DIMAS SILVERIO MD         Medications   0.9% sodium chloride BOLUS (0 mLs Intravenous Stopped 6/17/18 2030)   meclizine (ANTIVERT) tablet 25 mg (25 mg Oral Given 6/17/18 8297)   iopamidol (ISOVUE-370) solution 70 mL (70 mLs Intravenous Given 6/17/18 1846)   sodium chloride 0.9 % bag 500mL for CT scan flush use (100 mLs Intravenous Given 6/17/18 1846)       5:10 PM Patient assessed.    Assessments & Plan (with Medical Decision Making)records were reviewed. Labs were obtained. ekg was unchanged. Due to his presentation a CT scan of head and CTA of the head and neck were obtained. Patient was given ivf's and antivert. White count was not elevated and there was not a L shift. BMp was unremarkable and troponin was negative. Patient's urine was without any sign of infection . CT/CTA without any sign of significant abnormality. Patient was feeling much better and was able to ambulate without difficulty. He feels comfortable going home at this time.This appears to be a positional type vertigo. He will be given meclizine and will return if symptoms worsen or new symptoms develop.      I have reviewed the nursing notes.    I have reviewed the findings, diagnosis, plan and need for follow up with the patient.       Discharge Medication List as of 6/17/2018  9:08 PM      START taking these medications    Details   meclizine (ANTIVERT) 12.5 MG tablet Take 1 tablet (12.5 mg) by mouth 4 times daily as needed for dizziness, Disp-12 tablet, R-0, Local Print             Final diagnoses:   Dizziness   Vertigo     This document serves as a record of the services and decisions personally performed and made by Lonny Whitman,  MD. It was created on HIS/HER behalf by Madison Ewing, a trained medical scribe. The creation of this document is based the provider's statements to the medical scribe.  Madison Ewing 5:11 PM 6/17/2018    Provider:   The information in this document, created by the medical scribe for me, accurately reflects the services I personally performed and the decisions made by me. I have reviewed and approved this document for accuracy prior to leaving the patient care area.  Lonny Whitman MD 5:11 PM 6/17/2018 6/17/2018   [facility] EMERGENCY DEPARTMENT     Lonny Whitman MD  06/20/18 6120

## 2018-06-17 NOTE — ED AVS SNAPSHOT
[facility] Department    [address] [address]    Phone:  [phone]    Fax:  [phone]                                       Remington Gutierrez   MRN: [id number]    Department:  [facility] Emergency Department   Date of Visit:  6/17/2018           After Visit Summary Signature Page     I have received my discharge instructions, and my questions have been answered. I have discussed any challenges I see with this plan with the nurse or doctor.    ..........................................................................................................................................  Patient/Patient Representative Signature      ..........................................................................................................................................  Patient Representative Print Name and Relationship to Patient    ..................................................               ................................................  Date                                            Time    ..........................................................................................................................................  Reviewed by Signature/Title    ...................................................              ..............................................  Date                                                            Time

## 2018-06-17 NOTE — ED AVS SNAPSHOT
[facility] Emergency Department    [address] [facility] [address]    Phone:  [phone]    Fax:  [phone]                                       Remington Gutierrez   MRN: [id number]    Department:  [facility] Emergency Department   Date of Visit:  6/17/2018           Patient Information     Date Of Birth          1945        Your diagnoses for this visit were:     Dizziness     Vertigo        You were seen by Lonny Whitman MD.      Follow-up Information     Follow up with Laura Gregory APRN CNP.    Specialty:  Nurse Practitioner - Family    Why:  As needed    Contact information:    [address]  [facility] [address]  [phone]          Follow up with [facility] Emergency Department.    Specialty:  EMERGENCY MEDICINE    Why:  If symptoms worsen    Contact information:    [address] [place] [id number] [phone]    Additional information:    The medical center is located at   [address] [facility] (between [address] in [place], four miles north   of [place]).        Discharge Instructions       Return if symptoms worsen or new symptoms develop.  Follow-up with primary care physician next available.  Drink plenty of fluids.  Stand up slowly.  Take meclizine as directed.  If worsening dizziness any chest pain shortness of breath or other symptoms present please return for further evaluation and care.  Dizziness (Vertigo) and Balance Problems: Staying Safe     Replace burned-out light bulbs to keep your home safe and well lit.     Falls or accidents can lead to pain, broken bones, and fear of future falls. Protect yourself and others by preparing for episodes. Simple steps can help you stay safe at home and wherever you go.  Lighting  Keep all areas well lit. This helps your eyes send the right signals to the brain. It also makes you less likely to trip and fall. If bright lights make symptoms worse, dim the lights or lie  in a dark room until the dizziness passes. Then turn the lights back to their normal level.  Tips:    Keep a flashlight by the bed.    Place nightlights in bathrooms and hallways.    Replace burned-out bulbs, or have someone replace them for you.  Preventing falls  To reduce your risk of falling:    Get out of bed or up from a chair slowly.    Wear low-heeled shoes that fit properly and have slip-resistant soles.    Remove throw rugs. Clear clutter from walkways.    Use handrails on stairs. Have handrails installed or adjusted if needed.    Install grab bars in the bathroom. Don't use towel racks for balance.    Use a shower stool. Also put adhesive strips in the shower or on the tub floor.  Going out  With a little time and preparation, you can get around safely.  Tips:    Bring a cane or walking aid if needed.    Give yourself plenty of time in case you start to get dizzy.    Ask your healthcare provider what type of exercise is safe for your condition.    Be patient. If an activity such as walking through a crowded shop causes you stress, you may not be ready for it yet.  Driving  If you become dizzy or disoriented while driving, you could hurt yourself and others. That's why it's best to not drive until symptoms have gone away. In some cases, your license may be temporarily held until it's safe for you to drive again.  For safety:    Ask a friend to drive for you.    Take public transportation.    Walk to stores and other places when you can.  Asking for help  Don't be afraid to ask for help running errands, cooking meals, and doing exercise. Whether it's a friend, loved one, neighbor, or stranger on the street, a little help can make a world of difference.   Date Last Reviewed: 11/1/2016 2000-2017 [employer]. [address] [facility], [place] [address]. All rights reserved. This information is not intended as a substitute for professional medical care. Always follow your healthcare  professional's instructions.          Possible Causes of Dizziness or Fainting    Dizziness and fainting can have many causes. Below are some examples of possible causes your healthcare provider will look to rule out.  Benign paroxysmal positional vertigo (BPPV)  BPPV results when calcium crystals inside the inner ear shift into the wrong position. BPPV causes episodes of vertigo (a spinning sensation). Episodes most often happen when the head is moved in a certain way. This is more common in people 65 and older.   Infection or inflammation  The semicircular canals of the ear may become infected or inflamed. In this case, they can send the wrong balance signals. This can cause vertigo.  Meniere disease  Meniere disease happens when there is too much fluid in the semicircular canals. This can cause vertigo. It also can cause hearing problems and buzzing or ringing in the ears (called tinnitus). You may also have a feeling of pressure or fullness in the ear.  Syncope  Syncope is fainting that happens when the brain doesn t get enough oxygen-rich blood. It can be caused by low heart rate or low blood pressure. This is called vasovagal syncope. It can also be caused by sitting or standing up too quickly. This is called orthostatic hypotension. Syncope may also be due to a heart valve problem, an abnormal heart rhythm, or other heart problems. Dizziness can also happen from stroke, hemorrhage in the brain, or other problems in the brain. Your healthcare provider may do certain tests to rule out these conditions.  Other causes  Other causes include:    Medicines. Certain medicines can cause dizziness and even fainting. In some cases, stopping a medicine too quickly can lead to withdrawal symptoms, including dizziness and fainting.    Anxiety. Being anxious can lead to breathing changes, such as hyperventilation. These can lead to dizziness and fainting.  Additional causes for dizziness and fainting also exist. Talk to  your healthcare provider for more information.     Date Last Reviewed: 10/6/2015    4895-0749 [employer]. [address] [place] [address], [place] [address]. All rights reserved. This information is not intended as a substitute for professional medical care. Always follow your healthcare professional's instructions.          Vertigo (Unknown Cause)    In addition to helping with hearing, the inner ear is part of the balance center of your body. Problems with the inner ear can a false feeling of motion. This is called vertigo. Often, it feels as if you or the room is spinning. A vertigo attack may cause sudden nausea, vomiting and heavy sweating. Severe vertigo causes a loss of balance and can cause you to fall. During vertigo, small head movements and changes in body position will often make the symptoms worse. You may also have ringing in the ears called tinnitus.  An episode of vertigo may last seconds, minutes or hours. Once you are over the first episode, it may never come back. However, symptoms may return off and on.  The cause of your vertigo is not yet known. Possible causes of vertigo include:    Inflammation of the inner ear    Disease of the nerves to the inner ear    Movement of calcium particles in the inner ear    Poor blood flow to the balance centers of the brain    Migraine headaches    In older adults, the use of more than one medicine along with some health conditions  Home care    If symptoms are severe, rest quietly in bed. Change positions very slowly. There is usually one position that will feel best, such as lying on one side or lying on your back with your head slightly raised on pillows. Until you have no symptoms, you are at a higher risk of falling. Let someone help you when you get up. Get rid of home hazards such as loose electrical cords and throw rugs. Don t walk in unfamiliar areas that are not lighted. Use night lights in bathrooms and kitchen areas.    Do not drive a car or  work with dangerous machinery until symptoms have been gone for at least one week.    Take medicine as prescribed to relieve your symptoms. Unless another medicine was prescribed for symptoms of nausea, vomiting, and dizziness, you may use over-the-counter motion sickness pills. Ask your pharmacist for suggestions.  Follow-up care  Follow up with your healthcare provider or as directed. If you are referred to a specialist or for testing, make the appointment promptly.  When to seek medical advice  Call your healthcare provider if any of the following occur:    Fever of 100.4 F (38 C) or higher, or as directed by your healthcare provider    Vertigo worsens or is not controlled by prescribed medicine     Repeated vomiting not relieved by prescribed medicine     Severe headache    Confusion    Weakness of an arm or leg or 1 side of the face    Difficulty with speech or vision    Loss of consciousness     Seizure  Date Last Reviewed: 11/1/2017 2000-2017 [employer]. [address] [place] [address]. All rights reserved. This information is not intended as a substitute for professional medical care. Always follow your healthcare professional's instructions.          Your next 10 appointments already scheduled     Jun 26, 2018  1:20 PM CDT   Office Visit with BLANCO Reynoso [facility] ([facility])    [address] [facility] [address] [phone]           Bring a current list of meds and any records pertaining to this visit. For Physicals, please bring immunization records and any forms needing to be filled out. Please arrive 10 minutes early to complete paperwork.              24 Hour Appointment Hotline       To make an appointment at any [facility], call [phone] ([phone]). If you don't have a family doctor or clinic, we will help you find one. [facility] are conveniently located to serve the needs of  you and your family.             Review of your medicines      START taking        Dose / Directions Last dose taken    meclizine 12.5 MG tablet   Commonly known as:  ANTIVERT   Dose:  12.5 mg   Quantity:  12 tablet        Take 1 tablet (12.5 mg) by mouth 4 times daily as needed for dizziness   Refills:  0          Our records show that you are taking the medicines listed below. If these are incorrect, please call your family doctor or clinic.        Dose / Directions Last dose taken    ALPRAZOLAM PO   Dose:  0.25 mg        Take 0.25 mg by mouth 2 times daily as needed for anxiety   Refills:  0        DONEPEZIL HCL PO   Dose:  10 mg        Take 10 mg by mouth daily   Refills:  0        DOXYCYCLINE HYCLATE PO   Dose:  50 mg        Take 50 mg by mouth daily   Refills:  0        LAMOTRIGINE PO   Dose:  150 mg        Take 150 mg by mouth 2 times daily   Refills:  0        lisinopril 10 MG tablet   Commonly known as:  PRINIVIL/ZESTRIL   Dose:  10 mg   Quantity:  90 tablet        Take 1 tablet (10 mg) by mouth daily   Refills:  3        MELOXICAM PO   Dose:  15 mg        Take 15 mg by mouth daily   Refills:  0        metFORMIN 500 MG tablet   Commonly known as:  GLUCOPHAGE   Quantity:  180 tablet        1 tabs with breakfast and 1 tablet evening meal.   Refills:  3        PAROXETINE HCL PO   Dose:  40 mg        Take 40 mg by mouth daily   Refills:  0        SIMVASTATIN PO   Dose:  10 mg        Take 10 mg by mouth At Bedtime   Refills:  0        zolpidem 12.5 MG CR tablet   Commonly known as:  AMBIEN CR   Dose:  12.5 mg   Quantity:  30 tablet        Take 1 tablet (12.5 mg) by mouth nightly as needed for sleep   Refills:  5                Prescriptions were sent or printed at these locations (1 Prescription)                   [facility] [place], [place] - [address] [facility]   [address] [facility] [address]    Telephone:  [phone]   Fax:  [phone]   Hours:                  Printed at Department/Unit  printer (1 of 1)         meclizine (ANTIVERT) 12.5 MG tablet                Procedures and tests performed during your visit     Procedure/Test Number of Times Performed    CBC with platelets, differential 1    CT Head Neck Angio w/o & w Contrast 1    Comprehensive metabolic panel 1    EKG 12 lead 1    Head CT w/o contrast 1    Orthostatic blood pressure and pulse 1    Troponin I 2    UA reflex to Microscopic 1      Orders Needing Specimen Collection     None      Pending Results     Date and Time Order Name Status Description    6/17/2018 1727 CT Head Neck Angio w/o & w Contrast Preliminary     6/17/2018 1727 Head CT w/o contrast Preliminary             Pending Culture Results     No orders found from 6/15/2018 to 6/18/2018.            Pending Results Instructions     If you had any lab results that were not finalized at the time of your Discharge, you can call the ED Lab Result RN at [phone]. You will be contacted by this team for any positive Lab results or changes in treatment. The nurses are available 7 days a week from 10A to 6:30P.  You can leave a message 24 hours per day and they will return your call.        Test Results From Your Hospital Stay        6/17/2018  6:04 PM      Component Results     Component Value Ref Range & Units Status    WBC 9.2 4.0 - 11.0 10e9/L Final    RBC Count 4.54 4.4 - 5.9 10e12/L Final    Hemoglobin 14.9 13.3 - 17.7 g/dL Final    Hematocrit 45.0 40.0 - 53.0 % Final    MCV 99 78 - 100 fl Final    MCH 32.8 26.5 - 33.0 pg Final    MCHC 33.1 31.5 - 36.5 g/dL Final    RDW 12.2 10.0 - 15.0 % Final    Platelet Count 186 150 - 450 10e9/L Final    Diff Method Automated Method  Final    % Neutrophils 69.2 % Final    % Lymphocytes 15.1 % Final    % Monocytes 6.7 % Final    % Eosinophils 8.1 % Final    % Basophils 0.7 % Final    % Immature Granulocytes 0.2 % Final    Nucleated RBCs 0 0 /100 Final    Absolute Neutrophil 6.4 1.6 - 8.3 10e9/L Final    Absolute Lymphocytes 1.4 0.8 - 5.3  10e9/L Final    Absolute Monocytes 0.6 0.0 - 1.3 10e9/L Final    Absolute Eosinophils 0.7 0.0 - 0.7 10e9/L Final    Absolute Basophils 0.1 0.0 - 0.2 10e9/L Final    Abs Immature Granulocytes 0.0 0 - 0.4 10e9/L Final    Absolute Nucleated RBC 0.0  Final         6/17/2018  6:19 PM      Component Results     Component Value Ref Range & Units Status    Sodium 139 133 - 144 mmol/L Final    Potassium 5.0 3.4 - 5.3 mmol/L Final    Chloride 107 94 - 109 mmol/L Final    Carbon Dioxide 25 20 - 32 mmol/L Final    Anion Gap 7 3 - 14 mmol/L Final    Glucose 108 (H) 70 - 99 mg/dL Final    Urea Nitrogen 23 7 - 30 mg/dL Final    Creatinine 1.10 0.66 - 1.25 mg/dL Final    GFR Estimate 65 >60 mL/min/1.7m2 Final    Non  GFR Calc    GFR Estimate If Black 79 >60 mL/min/1.7m2 Final    African American GFR Calc    Calcium 9.2 8.5 - 10.1 mg/dL Final    Bilirubin Total 0.5 0.2 - 1.3 mg/dL Final    Albumin 4.3 3.4 - 5.0 g/dL Final    Protein Total 7.6 6.8 - 8.8 g/dL Final    Alkaline Phosphatase 72 40 - 150 U/L Final    ALT 26 0 - 70 U/L Final    AST 15 0 - 45 U/L Final         6/17/2018  6:19 PM      Component Results     Component Value Ref Range & Units Status    Troponin I ES <0.015 0.000 - 0.045 ug/L Final    The 99th percentile for upper reference range is 0.045 ug/L.  Troponin values   in the range of 0.045 - 0.120 ug/L may be associated with risks of adverse   clinical events.           6/17/2018  6:16 PM      Component Results     Component Value Ref Range & Units Status    Color Urine Yellow  Final    Appearance Urine Clear  Final    Glucose Urine Negative NEG^Negative mg/dL Final    Bilirubin Urine Negative NEG^Negative Final    Ketones Urine Negative NEG^Negative mg/dL Final    Specific Gravity Urine 1.020 1.003 - 1.035 Final    Blood Urine Negative NEG^Negative Final    pH Urine 5.0 5.0 - 7.0 pH Final    Protein Albumin Urine Negative NEG^Negative mg/dL Final    Urobilinogen mg/dL 0.0 0.0 - 2.0 mg/dL Final     Nitrite Urine Negative NEG^Negative Final    Leukocyte Esterase Urine Negative NEG^Negative Final    Source Midstream Urine  Final    RBC Urine 1 0 - 2 /HPF Final    WBC Urine 2 0 - 5 /HPF Final    Squamous Epithelial /HPF Urine <1 0 - 1 /HPF Final    Mucous Urine Present (A) NEG^Negative /LPF Final         6/17/2018  7:13 PM      Narrative     CT OF THE HEAD WITHOUT CONTRAST  6/17/2018 7:07 PM     COMPARISON: Brain MRI 3/18/2011.    HISTORY: Dizziness.     TECHNIQUE: Axial CT images of the head from the skull base to the  vertex were acquired without IV contrast.    FINDINGS: The ventricles and basal cisterns are within normal limits  in configuration. There is no midline shift. There are no extra-axial  fluid collections. Gray-white differentiation is well maintained.    No intracranial hemorrhage, mass or recent infarct.    The visualized paranasal sinuses are well-aerated. There is no  mastoiditis. There are no fractures of the visualized bones.        Impression     IMPRESSION: Normal head CT.    Radiation dose for this scan was reduced using automated exposure  control, adjustment of the mA and/or kV according to patient size, or  iterative reconstruction technique         6/17/2018  7:22 PM      Narrative     CT ANGIOGRAM OF THE HEAD AND NECK WITHOUT AND WITH CONTRAST  6/17/2018  7:13 PM     COMPARISON: None.    HISTORY: Dizziness.    TECHNIQUE: Precontrast localizing scans were followed by CT  angiography with an injection of 70 mL Isovue 370 nonionic intravenous  contrast material with scans through the head and neck.  Images were  transferred to a separate 3-D workstation where multiplanar  reformations and 3-D images were created.  Estimates of carotid  stenoses are made relative to the distal internal carotid artery  diameters except as noted.      FINDINGS:   Neck CTA: The common carotid arteries bilaterally are patent without  stenosis. There is mild atherosclerotic narrowing (less than 50%  luminal  "diameter stenosis) at the origins of the internal carotid  arteries on both sides. The cervical internal carotid arteries  bilaterally are tortuous but are otherwise patent with no additional  areas of narrowing. The dominant right and tiny left vertebral  arteries are patent without stenosis.    Head CTA: The basilar, bilateral distal internal carotid, bilateral  anterior cerebral, bilateral middle cerebral and bilateral posterior  cerebral arteries are patent and unremarkable. The anterior  communicating and bilateral posterior communicating arteries are  patent and unremarkable.        Impression     IMPRESSION: Mild atherosclerotic narrowing (less than 50% luminal  diameter stenosis) at the origins of the internal carotid arteries on  both sides. Otherwise, normal neck and head CTA.      Radiation dose for this scan was reduced using automated exposure  control, adjustment of the mA and/or kV according to patient size, or  iterative reconstruction technique         6/17/2018  8:48 PM      Component Results     Component Value Ref Range & Units Status    Troponin I ES <0.015 0.000 - 0.045 ug/L Final    The 99th percentile for upper reference range is 0.045 ug/L.  Troponin values   in the range of 0.045 - 0.120 ug/L may be associated with risks of adverse   clinical events.                  Thank you for choosing [facility]       Thank you for choosing [facility] for your care. Our goal is always to provide you with excellent care. Hearing back from our patients is one way we can continue to improve our services. Please take a few minutes to complete the written survey that you may receive in the mail after you visit with us. Thank you!        [email or web address] Information     [employer] lets you send messages to your doctor, view your test results, renew your prescriptions, schedule appointments and more. To sign up, go to [email or web address] . Click on \"Log in\" on the left side of the screen, which will take you to the " "Welcome page. Then click on \"Sign up Now\" on the right side of the page.     You will be asked to enter the access code listed below, as well as some personal information. Please follow the directions to create your username and password.     Your access code is: [email or web address]  Expires: 9/15/2018  9:08 PM     Your access code will  in 90 days. If you need help or a new code, please call your [facility] clinic or [phone].        Care EveryWhere ID     This is your Care EveryWhere ID. This could be used by other organizations to access your [facility] medical records  [id number]        Equal Access to Services     LOUIE NUNES : Monster Hartman, alexis monteiro, lauren pollack, daniella north. So [facility] [phone].    ATENCIÓN: Si habla español, tiene a armas disposición servicios gratuitos de asistencia lingüística. Llame al [phone].    We comply with applicable federal civil rights laws and [place] laws. We do not discriminate on the basis of race, color, national origin, age, disability, sex, sexual orientation, or gender identity.            After Visit Summary       This is your record. Keep this with you and show to your community pharmacist(s) and doctor(s) at your next visit.                  "

## 2018-06-18 NOTE — DISCHARGE INSTRUCTIONS
Return if symptoms worsen or new symptoms develop.  Follow-up with primary care physician next available.  Drink plenty of fluids.  Stand up slowly.  Take meclizine as directed.  If worsening dizziness any chest pain shortness of breath or other symptoms present please return for further evaluation and care.  Dizziness (Vertigo) and Balance Problems: Staying Safe     Replace burned-out light bulbs to keep your home safe and well lit.     Falls or accidents can lead to pain, broken bones, and fear of future falls. Protect yourself and others by preparing for episodes. Simple steps can help you stay safe at home and wherever you go.  Lighting  Keep all areas well lit. This helps your eyes send the right signals to the brain. It also makes you less likely to trip and fall. If bright lights make symptoms worse, dim the lights or lie in a dark room until the dizziness passes. Then turn the lights back to their normal level.  Tips:    Keep a flashlight by the bed.    Place nightlights in bathrooms and hallways.    Replace burned-out bulbs, or have someone replace them for you.  Preventing falls  To reduce your risk of falling:    Get out of bed or up from a chair slowly.    Wear low-heeled shoes that fit properly and have slip-resistant soles.    Remove throw rugs. Clear clutter from walkways.    Use handrails on stairs. Have handrails installed or adjusted if needed.    Install grab bars in the bathroom. Don't use towel racks for balance.    Use a shower stool. Also put adhesive strips in the shower or on the tub floor.  Going out  With a little time and preparation, you can get around safely.  Tips:    Bring a cane or walking aid if needed.    Give yourself plenty of time in case you start to get dizzy.    Ask your healthcare provider what type of exercise is safe for your condition.    Be patient. If an activity such as walking through a crowded shop causes you stress, you may not be ready for it yet.  Driving  If you  become dizzy or disoriented while driving, you could hurt yourself and others. That's why it's best to not drive until symptoms have gone away. In some cases, your license may be temporarily held until it's safe for you to drive again.  For safety:    Ask a friend to drive for you.    Take public transportation.    Walk to stores and other places when you can.  Asking for help  Don't be afraid to ask for help running errands, cooking meals, and doing exercise. Whether it's a friend, loved one, neighbor, or stranger on the street, a little help can make a world of difference.   Date Last Reviewed: 11/1/2016 2000-2017 [employer]. [address] [place] [address], [place] [address]. All rights reserved. This information is not intended as a substitute for professional medical care. Always follow your healthcare professional's instructions.          Possible Causes of Dizziness or Fainting    Dizziness and fainting can have many causes. Below are some examples of possible causes your healthcare provider will look to rule out.  Benign paroxysmal positional vertigo (BPPV)  BPPV results when calcium crystals inside the inner ear shift into the wrong position. BPPV causes episodes of vertigo (a spinning sensation). Episodes most often happen when the head is moved in a certain way. This is more common in people 65 and older.   Infection or inflammation  The semicircular canals of the ear may become infected or inflamed. In this case, they can send the wrong balance signals. This can cause vertigo.  Meniere disease  Meniere disease happens when there is too much fluid in the semicircular canals. This can cause vertigo. It also can cause hearing problems and buzzing or ringing in the ears (called tinnitus). You may also have a feeling of pressure or fullness in the ear.  Syncope  Syncope is fainting that happens when the brain doesn t get enough oxygen-rich blood. It can be caused by low heart rate or low blood  pressure. This is called vasovagal syncope. It can also be caused by sitting or standing up too quickly. This is called orthostatic hypotension. Syncope may also be due to a heart valve problem, an abnormal heart rhythm, or other heart problems. Dizziness can also happen from stroke, hemorrhage in the brain, or other problems in the brain. Your healthcare provider may do certain tests to rule out these conditions.  Other causes  Other causes include:    Medicines. Certain medicines can cause dizziness and even fainting. In some cases, stopping a medicine too quickly can lead to withdrawal symptoms, including dizziness and fainting.    Anxiety. Being anxious can lead to breathing changes, such as hyperventilation. These can lead to dizziness and fainting.  Additional causes for dizziness and fainting also exist. Talk to your healthcare provider for more information.     Date Last Reviewed: 10/6/2015    1291-9846 [employer]. [address] [place] [address]. All rights reserved. This information is not intended as a substitute for professional medical care. Always follow your healthcare professional's instructions.          Vertigo (Unknown Cause)    In addition to helping with hearing, the inner ear is part of the balance center of your body. Problems with the inner ear can a false feeling of motion. This is called vertigo. Often, it feels as if you or the room is spinning. A vertigo attack may cause sudden nausea, vomiting and heavy sweating. Severe vertigo causes a loss of balance and can cause you to fall. During vertigo, small head movements and changes in body position will often make the symptoms worse. You may also have ringing in the ears called tinnitus.  An episode of vertigo may last seconds, minutes or hours. Once you are over the first episode, it may never come back. However, symptoms may return off and on.  The cause of your vertigo is not yet known. Possible causes of vertigo  include:    Inflammation of the inner ear    Disease of the nerves to the inner ear    Movement of calcium particles in the inner ear    Poor blood flow to the balance centers of the brain    Migraine headaches    In older adults, the use of more than one medicine along with some health conditions  Home care    If symptoms are severe, rest quietly in bed. Change positions very slowly. There is usually one position that will feel best, such as lying on one side or lying on your back with your head slightly raised on pillows. Until you have no symptoms, you are at a higher risk of falling. Let someone help you when you get up. Get rid of home hazards such as loose electrical cords and throw rugs. Don t walk in unfamiliar areas that are not lighted. Use night lights in bathrooms and kitchen areas.    Do not drive a car or work with dangerous machinery until symptoms have been gone for at least one week.    Take medicine as prescribed to relieve your symptoms. Unless another medicine was prescribed for symptoms of nausea, vomiting, and dizziness, you may use over-the-counter motion sickness pills. Ask your pharmacist for suggestions.  Follow-up care  Follow up with your healthcare provider or as directed. If you are referred to a specialist or for testing, make the appointment promptly.  When to seek medical advice  Call your healthcare provider if any of the following occur:    Fever of 100.4 F (38 C) or higher, or as directed by your healthcare provider    Vertigo worsens or is not controlled by prescribed medicine     Repeated vomiting not relieved by prescribed medicine     Severe headache    Confusion    Weakness of an arm or leg or 1 side of the face    Difficulty with speech or vision    Loss of consciousness     Seizure  Date Last Reviewed: 11/1/2017 2000-2017 [employer]. [address] [place] [address], [place] [address]. All rights reserved. This information is not intended as a substitute for  professional medical care. Always follow your healthcare professional's instructions.

## 2018-06-20 ASSESSMENT — ENCOUNTER SYMPTOMS
HEADACHES: 0
VOMITING: 0
SPEECH DIFFICULTY: 0
APPETITE CHANGE: 0
CONFUSION: 0
NUMBNESS: 0
ABDOMINAL PAIN: 0
BACK PAIN: 0
ACTIVITY CHANGE: 1
TROUBLE SWALLOWING: 0
DYSURIA: 0
CHILLS: 0
NECK PAIN: 0
SEIZURES: 0
BRUISES/BLEEDS EASILY: 0
WEAKNESS: 0

## 2018-06-26 ENCOUNTER — OFFICE VISIT (OUTPATIENT)
Dept: FAMILY MEDICINE | Facility: CLINIC | Age: 73
End: 2018-06-26
Payer: COMMERCIAL

## 2018-06-26 VITALS
TEMPERATURE: 97.9 F | BODY MASS INDEX: 31.98 KG/M2 | WEIGHT: 211 LBS | SYSTOLIC BLOOD PRESSURE: 123 MMHG | DIASTOLIC BLOOD PRESSURE: 72 MMHG | HEART RATE: 63 BPM | RESPIRATION RATE: 16 BRPM | HEIGHT: 68 IN

## 2018-06-26 DIAGNOSIS — E78.5 HYPERLIPIDEMIA LDL GOAL <100: ICD-10-CM

## 2018-06-26 DIAGNOSIS — H90.3 SENSORINEURAL HEARING LOSS, BILATERAL: ICD-10-CM

## 2018-06-26 DIAGNOSIS — G47.33 OBSTRUCTIVE SLEEP APNEA: ICD-10-CM

## 2018-06-26 DIAGNOSIS — R41.3 MEMORY LOSS: ICD-10-CM

## 2018-06-26 DIAGNOSIS — Z23 NEED FOR VACCINATION: ICD-10-CM

## 2018-06-26 DIAGNOSIS — E11.9 TYPE 2 DIABETES MELLITUS WITHOUT COMPLICATION, WITHOUT LONG-TERM CURRENT USE OF INSULIN (H): Primary | ICD-10-CM

## 2018-06-26 LAB
CREAT UR-MCNC: 261 MG/DL
HBA1C MFR BLD: 6.9 % (ref 0–5.6)
MICROALBUMIN UR-MCNC: 17 MG/L
MICROALBUMIN/CREAT UR: 6.55 MG/G CR (ref 0–17)

## 2018-06-26 PROCEDURE — 83036 HEMOGLOBIN GLYCOSYLATED A1C: CPT | Performed by: NURSE PRACTITIONER

## 2018-06-26 PROCEDURE — 36415 COLL VENOUS BLD VENIPUNCTURE: CPT | Performed by: NURSE PRACTITIONER

## 2018-06-26 PROCEDURE — 99207 C FOOT EXAM  NO CHARGE: CPT | Performed by: NURSE PRACTITIONER

## 2018-06-26 PROCEDURE — 90714 TD VACC NO PRESV 7 YRS+ IM: CPT | Performed by: NURSE PRACTITIONER

## 2018-06-26 PROCEDURE — 99214 OFFICE O/P EST MOD 30 MIN: CPT | Mod: 25 | Performed by: NURSE PRACTITIONER

## 2018-06-26 PROCEDURE — 82043 UR ALBUMIN QUANTITATIVE: CPT | Performed by: NURSE PRACTITIONER

## 2018-06-26 PROCEDURE — 90471 IMMUNIZATION ADMIN: CPT | Performed by: NURSE PRACTITIONER

## 2018-06-26 ASSESSMENT — ANXIETY QUESTIONNAIRES
3. WORRYING TOO MUCH ABOUT DIFFERENT THINGS: NOT AT ALL
7. FEELING AFRAID AS IF SOMETHING AWFUL MIGHT HAPPEN: NOT AT ALL
5. BEING SO RESTLESS THAT IT IS HARD TO SIT STILL: NOT AT ALL
GAD7 TOTAL SCORE: 0
1. FEELING NERVOUS, ANXIOUS, OR ON EDGE: NOT AT ALL
2. NOT BEING ABLE TO STOP OR CONTROL WORRYING: NOT AT ALL
6. BECOMING EASILY ANNOYED OR IRRITABLE: NOT AT ALL

## 2018-06-26 ASSESSMENT — PATIENT HEALTH QUESTIONNAIRE - PHQ9: 5. POOR APPETITE OR OVEREATING: NOT AT ALL

## 2018-06-26 NOTE — PROGRESS NOTES
"  SUBJECTIVE:   Remington Gutierrez is a 72 year old male who presents to clinic today for the following health issues:      Diabetes Follow-up      Patient is checking blood sugars: not at all    Diabetic concerns: None     Symptoms of hypoglycemia (low blood sugar): shaky     Paresthesias (numbness or burning in feet) or sores: Yes tingling at times.     Date of last diabetic eye exam: has an appointment in November.    Diabetes Management Resources    Hyperlipidemia Follow-Up      Rate your low fat/cholesterol diet?: not monitoring fat    Taking statin?  Yes, no muscle aches from statin    Other lipid medications/supplements?:  none    Hypertension Follow-up      Outpatient blood pressures are not being checked.    Low Salt Diet: not monitoring salt    BP Readings from Last 2 Encounters:   06/26/18 123/72   06/17/18 135/88     Hemoglobin A1C (%)   Date Value   06/26/2018 6.9 (H)   03/22/2017 6.4 (H)     LDL Cholesterol Calculated (mg/dL)   Date Value   05/01/2014 62   11/08/2011 76     LDL Cholesterol Direct (mg/dL)   Date Value   06/07/2017 45   05/04/2016 69       Amount of exercise or physical activity: None    Problems taking medications regularly: No    Medication side effects: none    Diet: regular (no restrictions)            Problem list and histories reviewed & adjusted, as indicated.  Additional history: accompanied by his girlfriend. He states he's fine. He thought he had fatigue but states it's just \"laziness\"   He denies any specific health concern today. [facility]. He has upcoming eye exam and recheck of sleep apnea scheduled there in November.  He has memory issues and his daughter helps to manage his medications.  Hearing aids for hearing loss.      Patient Active Problem List   Diagnosis     Sensorineural Hearing Loss, Bilateral     Dysthymic disorder     Obstructive sleep apnea     Osteoarthritis     Obesity     Memory loss     Backache     HYPERLIPIDEMIA LDL " GOAL <100     Advanced directives, counseling/discussion     Type 2 diabetes mellitus without complication, without long-term current use of insulin (H)     Past Surgical History:   Procedure Laterality Date     COLONOSCOPY  7/2/03     ORTHOPEDIC SURGERY       SURGICAL HISTORY OF -   2004    Arthroscopy (L) Knee      SURGICAL HISTORY OF -   9/08    left TKT     SURGICAL HISTORY OF -   10/08    Right THR       Social History   Substance Use Topics     Smoking status: Current Every Day Smoker     Packs/day: 2.00     Years: 25.00     Types: Cigarettes     Last attempt to quit: 1/31/2005     Smokeless tobacco: Never Used     Alcohol use Yes      Comment: very little     Family History   Problem Relation Age of Onset     Respiratory Mother      copd     HEART DISEASE Mother      Cardiovascular Mother 75     MI     Coronary Artery Disease Mother      Cancer Father      kidney     Arthritis Brother      Cardiovascular Brother      Chronic Obstructive Pulmonary Disease Brother      Rheumatoid Arthritis Brother      Hyperlipidemia Brother      Allergies Sister      Depression Sister      Coronary Artery Disease Maternal Grandmother      Cerebrovascular Disease Brother      Diabetes Brother      Hypertension Brother      Hyperlipidemia Brother      Prostate Cancer Brother      Cerebrovascular Disease Brother      Hyperlipidemia Brother      Depression Brother      Family History Negative No family hx of          Current Outpatient Prescriptions   Medication Sig Dispense Refill     ALPRAZOLAM PO Take 0.25 mg by mouth 2 times daily as needed for anxiety       DONEPEZIL HCL PO Take 10 mg by mouth daily       DOXYCYCLINE HYCLATE PO Take 50 mg by mouth daily       lisinopril (PRINIVIL/ZESTRIL) 10 MG tablet Take 1 tablet (10 mg) by mouth daily 90 tablet 3     meclizine (ANTIVERT) 12.5 MG tablet Take 1 tablet (12.5 mg) by mouth 4 times daily as needed for dizziness 12 tablet 0     metFORMIN (GLUCOPHAGE) 500 MG tablet 1 tabs with  breakfast and 1 tablet evening meal. 180 tablet 3     PAROXETINE HCL PO Take 40 mg by mouth daily       SIMVASTATIN PO Take 10 mg by mouth At Bedtime       zolpidem (AMBIEN CR) 12.5 MG CR tablet Take 1 tablet (12.5 mg) by mouth nightly as needed for sleep 30 tablet 5     LAMOTRIGINE PO Take 150 mg by mouth 2 times daily       MELOXICAM PO Take 15 mg by mouth daily       Allergies   Allergen Reactions     Neomycin Rash     Recent Labs   Lab Test  06/26/18   1322  06/17/18   1745  06/07/17   1125  03/22/17   0951  09/27/16   1558  05/04/16   1130  04/29/15   0940  05/01/14   0847   03/20/13 2015 11/08/11   0944   02/11/11   0937   A1C  6.9*   --    --   6.4*  6.4*  8.0*  6.7*  6.3*   < >   --    < >  6.4*   < >  7.7*   LDL   --    --   45   --    --   69  51  62   --    --    < >  76   --   63   HDL   --    --    --    --    --    --    --   35*   --    --    --   43   --   42   TRIG   --    --    --    --    --    --    --   270*   --    --    --   270*   --   207*   ALT   --   26  28   --    --    --    --    --    --   41   < >   --    --    --    CR   --   1.10  0.96   --    --   0.88  0.98  1.09   --   1.07   < >   --    < >  1.06   GFRESTIMATED   --   65  77   --    --   85  75  67   --   69   < >   --    < >  70   GFRESTBLACK   --   79  >90   GFR Calc     --    --   >90   GFR Calc    >90   GFR Calc    81   --   83   < >   --    < >  84   POTASSIUM   --   5.0  4.5   --    --   5.0  4.4  5.2   --   4.1   < >   --    < >  4.7   TSH   --    --   1.69   --    --   1.11   --   1.93   < >   --    --    --    < >   --     < > = values in this interval not displayed.      BP Readings from Last 3 Encounters:   06/26/18 123/72   06/17/18 135/88   06/16/17 139/70    Wt Readings from Last 3 Encounters:   06/26/18 211 lb (95.7 kg)   06/16/17 212 lb (96.2 kg)   06/07/17 213 lb (96.6 kg)                    Reviewed and updated as needed this visit by clinical staff  Tobacco   "Allergies  Meds  Med Hx  Surg Hx  Fam Hx  Soc Hx      Reviewed and updated as needed this visit by Provider          ROS: 10 point ROS neg other than the symptoms noted above in the HPI.    OBJECTIVE:     /72 (BP Location: Right arm, Patient Position: Chair, Cuff Size: Adult Large)  Pulse 63  Temp 97.9  F (36.6  C) (Oral)  Resp 16  Ht 5' 8\" (1.727 m)  Wt 211 lb (95.7 kg)  BMI 32.08 kg/m2  Body mass index is 32.08 kg/(m^2).  GENERAL: healthy, alert and no distress  HENT: ear canals and TM's normal, pharynx without erythema, bilateral hearing aids  NECK: no adenopathy, no asymmetry  RESP: lungs clear to auscultation - no rales, rhonchi or wheezes  CV: regular rate and rhythm, normal S1 S2, no S3 or S4, no murmur  ABDOMEN: soft, nontender  MS: no gross musculoskeletal defects noted      Diagnostic Test Results:  Results for orders placed or performed in visit on 06/26/18 (from the past 24 hour(s))   Hemoglobin A1c   Result Value Ref Range    Hemoglobin A1C 6.9 (H) 0 - 5.6 %       ASSESSMENT/PLAN:             1. Type 2 diabetes mellitus without complication, without long-term current use of insulin (H)    - Hemoglobin A1c  - FOOT EXAM  - Albumin Random Urine Quantitative with Creat Ratio  Doing well at this time, continue same plan of care.    2. Need for vaccination    - TD PRSERV FREE >=7 YRS ADS IM [[id number]]  - 1st  Administration  [[id number]]    3. Sensorineural hearing loss, bilateral      4. Obstructive sleep apnea    [facility]      5. Memory loss    List of medications given to patient and S.O. As well as a log to write them down.    6. Hyperlipidemia LDL goal <100    - Lipid panel reflex to direct LDL Fasting; Future    See Patient Instructions  Patient Instructions   Will be notified of urine test.  Return to clinic when you haven't eaten for cholesterol blood test at the lab.  Continue all other medications.  [facility]  Consider U/S to rule out any " aortic aneurysm and can do that at anytime at [place].  Tetanus booster given today.  Follow up six months and as needed.        Thank you for choosing [facility].  You may be receiving a survey in the mail from Laila Funes regarding your visit today.  Please take a few minutes to complete and return the survey to let us know how we are doing.      Our Clinic hours are:  Mondays    7:20 am - 7 pm  Tues -  Fri  7:20 am - 5 pm    Clinic Phone: [phone]    The clinic lab opens at 7:30 am Mon - Fri and appointments are required.    [facility] [place]  Ph. [phone]  Monday  8 am - 7pm  Tues - Fri 8 am - 5:30 pm             BLANCO Reynoso CNP  [facility]

## 2018-06-26 NOTE — MR AVS SNAPSHOT
After Visit Summary   6/26/2018    Remington Gutierrez    MRN: [id number]           Patient Information     Date Of Birth          1945        Visit Information        Provider Department      6/26/2018 1:20 PM Laura Gregory APRN [facility]        Today's Diagnoses     Type 2 diabetes mellitus without complication, without long-term current use of insulin (H)    -  1    Need for vaccination        Sensorineural hearing loss, bilateral        Obstructive sleep apnea        Memory loss        Hyperlipidemia LDL goal <100          Care Instructions    Will be notified of urine test.  Return to clinic when you haven't eaten for cholesterol blood test at the lab.  Continue all other medications.  [facility]  Consider U/S to rule out any aortic aneurysm and can do that at anytime at [place].  Tetanus booster given today.  Follow up six months and as needed.        Thank you for choosing [facility].  You may be receiving a survey in the mail from [employer] regarding your visit today.  Please take a few minutes to complete and return the survey to let us know how we are doing.      Our Clinic hours are:  Mondays    7:20 am - 7 pm  Tues -  Fri  7:20 am - 5 pm    Clinic Phone: [phone]    The clinic lab opens at 7:30 am Mon - Fri and appointments are required.    [facility] [place]  Ph. [phone]  Monday  8 am - 7pm  Tues - Fri 8 am - 5:30 pm                 Follow-ups after your visit        Follow-up notes from your care team     Return in about 6 months (around 12/26/2018), or if symptoms worsen or fail to improve, for Routine Visit.      Future tests that were ordered for you today     Open Future Orders        Priority Expected Expires Ordered    Lipid panel reflex to direct LDL Fasting Routine 5/27/2019 6/26/2019 6/26/2018            Who to contact     If you have questions or need follow up information about today's clinic visit or your  "schedule please contact [facility] directly at [phone].  Normal or non-critical lab and imaging results will be communicated to you by MyChart, letter or phone within 4 business days after the clinic has received the results. If you do not hear from us within 7 days, please contact the clinic through MyChart or phone. If you have a critical or abnormal lab result, we will notify you by phone as soon as possible.  Submit refill requests through [employer] or call your pharmacy and they will forward the refill request to us. Please allow 3 business days for your refill to be completed.          Additional Information About Your Visit        Care EveryWhere ID     This is your Care EveryWhere ID. This could be used by other organizations to access your [place] medical records  [id number]        Your Vitals Were     Pulse Temperature Respirations Height BMI (Body Mass Index)       63 97.9  F (36.6  C) (Oral) 16 5' 8\" (1.727 m) 32.08 kg/m2        Blood Pressure from Last 3 Encounters:   06/26/18 123/72   06/17/18 135/88   06/16/17 139/70    Weight from Last 3 Encounters:   06/26/18 211 lb (95.7 kg)   06/16/17 212 lb (96.2 kg)   06/07/17 213 lb (96.6 kg)              We Performed the Following     1st  Administration  [[id number]]     Albumin Random Urine Quantitative with Creat Ratio     FOOT EXAM     Hemoglobin A1c     TD PRSERV FREE >=7 YRS ADS IM [[id number]]        Primary Care Provider Office Phone # Fax #    Laura Elsy Gregory, BLANCO [phone]       [address] [facility] [address]        Equal Access to Services     [facility] MANJU AH: Hadii jake navarro Soshari, waaxda luqadaha, qaybta kaalmada jaki, daniella north. So [facility] [phone].    ATENCIÓN: Si habla español, tiene a armas disposición servicios gratuitos de asistencia lingüística. Lyndsey fonseca [phone].    We comply with applicable federal civil rights laws and [place] laws. We do not " discriminate on the basis of race, color, national origin, age, disability, sex, sexual orientation, or gender identity.            Thank you!     Thank you for choosing [facility]  for your care. Our goal is always to provide you with excellent care. Hearing back from our patients is one way we can continue to improve our services. Please take a few minutes to complete the written survey that you may receive in the mail after your visit with us. Thank you!             Your Updated Medication List - Protect others around you: Learn how to safely use, store and throw away your medicines at www.disposemymeds.org.          This list is accurate as of 6/26/18  2:08 PM.  Always use your most recent med list.                   Brand Name Dispense Instructions for use Diagnosis    ALPRAZOLAM PO      Take 0.25 mg by mouth 2 times daily as needed for anxiety        DONEPEZIL HCL PO      Take 10 mg by mouth daily        DOXYCYCLINE HYCLATE PO      Take 50 mg by mouth daily        LAMOTRIGINE PO      Take 150 mg by mouth 2 times daily        lisinopril 10 MG tablet    PRINIVIL/ZESTRIL    90 tablet    Take 1 tablet (10 mg) by mouth daily    Type 2 diabetes mellitus without complication, without long-term current use of insulin (H)       meclizine 12.5 MG tablet    ANTIVERT    12 tablet    Take 1 tablet (12.5 mg) by mouth 4 times daily as needed for dizziness        MELOXICAM PO      Take 15 mg by mouth daily        metFORMIN 500 MG tablet    GLUCOPHAGE    180 tablet    1 tabs with breakfast and 1 tablet evening meal.    Type 2 diabetes mellitus without complication, without long-term current use of insulin (H)       PAROXETINE HCL PO      Take 40 mg by mouth daily        SIMVASTATIN PO      Take 10 mg by mouth At Bedtime        zolpidem 12.5 MG CR tablet    AMBIEN CR    30 tablet    Take 1 tablet (12.5 mg) by mouth nightly as needed for sleep    Insomnia, unspecified type

## 2018-06-26 NOTE — NURSING NOTE
Screening Questionnaire for Adult Immunization    Are you sick today?   No   Do you have allergies to medications, food, a vaccine component or latex?   No   Have you ever had a serious reaction after receiving a vaccination?   No   Do you have a long-term health problem with heart disease, lung disease, asthma, kidney disease, metabolic disease (e.g. diabetes), anemia, or other blood disorder?   No   Do you have cancer, leukemia, HIV/AIDS, or any other immune system problem?   No   In the past 3 months, have you taken medications that affect  your immune system, such as prednisone, other steroids, or anticancer drugs; drugs for the treatment of rheumatoid arthritis, Crohn s disease, or psoriasis; or have you had radiation treatments?   No   Have you had a seizure, or a brain or other nervous system problem?   No   During the past year, have you received a transfusion of blood or blood     products, or been given immune (gamma) globulin or antiviral drug?   No   For women: Are you pregnant or is there a chance you could become        pregnant during the next month?   No   Have you received any vaccinations in the past 4 weeks?   No     Immunization questionnaire answers were all negative.        Per orders of Laura Gregory, injection of Td given by Kaitlin Givens. Patient instructed to remain in clinic for 15 minutes afterwards, and to report any adverse reaction to me immediately.       Screening performed by Kaitlin Givens on 6/26/2018 at 1:55 PM.    Prior to injection verified patient identity using patient's name and date of birth.  Due to injection administration, patient instructed to remain in clinic for 15 minutes  afterwards, and to report any adverse reaction to me immediately.

## 2018-06-26 NOTE — PATIENT INSTRUCTIONS
Will be notified of urine test.  Return to clinic when you haven't eaten for cholesterol blood test at the lab.  Continue all other medications.  [facility]  Consider U/S to rule out any aortic aneurysm and can do that at anytime at [place].  Tetanus booster given today.  Follow up six months and as needed.        Thank you for choosing [facility].  You may be receiving a survey in the mail from [employer] regarding your visit today.  Please take a few minutes to complete and return the survey to let us know how we are doing.      Our Clinic hours are:  Mondays    7:20 am - 7 pm  Tues -  Fri  7:20 am - 5 pm    Clinic Phone: [phone]    The clinic lab opens at 7:30 am Mon - Fri and appointments are required.    [facility]. [phone]  Monday  8 am - 7pm  Tues - Fri 8 am - 5:30 pm

## 2018-06-27 ASSESSMENT — ANXIETY QUESTIONNAIRES: GAD7 TOTAL SCORE: 0

## 2018-06-27 ASSESSMENT — PATIENT HEALTH QUESTIONNAIRE - PHQ9: SUM OF ALL RESPONSES TO PHQ QUESTIONS 1-9: 0

## 2018-07-05 ENCOUNTER — TELEPHONE (OUTPATIENT)
Dept: PEDIATRICS | Facility: CLINIC | Age: 73
End: 2018-07-05

## 2018-07-05 DIAGNOSIS — R42 VERTIGO: Primary | ICD-10-CM

## 2018-07-05 NOTE — TELEPHONE ENCOUNTER
[facility] at [facility]- [phone] physical therapy called requesting orders be placed for patient due to vestibular/vertigo patient was seen in ED on 06/17/2018-- has appt for PT on Monday at 215p. Had appt with PCP on 06/26.     Jacqui SAL  [facility]

## 2018-07-05 NOTE — TELEPHONE ENCOUNTER
You ok with this?  He was seen 6-26-18, however, not for ER f/u.  Dizziness, vertigo was not discussed.    Referral ready w/dx vertigo.    Routing to provider.  Janine GONZALES RN

## 2018-07-18 ENCOUNTER — HOSPITAL ENCOUNTER (OUTPATIENT)
Dept: PHYSICAL THERAPY | Facility: CLINIC | Age: 73
Setting detail: THERAPIES SERIES
End: 2018-07-18
Attending: NURSE PRACTITIONER
Payer: MEDICARE

## 2018-07-18 PROCEDURE — 97110 THERAPEUTIC EXERCISES: CPT | Mod: GP | Performed by: PHYSICAL THERAPIST

## 2018-07-18 PROCEDURE — 40000840 ZZHC STATISTIC PT VESTIBULAR VISIT: Performed by: PHYSICAL THERAPIST

## 2018-07-18 PROCEDURE — G8981 BODY POS CURRENT STATUS: HCPCS | Mod: GP,CI | Performed by: PHYSICAL THERAPIST

## 2018-07-18 PROCEDURE — G8982 BODY POS GOAL STATUS: HCPCS | Mod: GP,CH | Performed by: PHYSICAL THERAPIST

## 2018-07-18 PROCEDURE — 97140 MANUAL THERAPY 1/> REGIONS: CPT | Mod: GP | Performed by: PHYSICAL THERAPIST

## 2018-07-18 PROCEDURE — 97161 PT EVAL LOW COMPLEX 20 MIN: CPT | Mod: GP | Performed by: PHYSICAL THERAPIST

## 2018-07-18 NOTE — PROGRESS NOTES
Remington Pizarrojohn  1945 Physical Therapy Initial Evaluation  07/18/18 1400   Quick Adds   Quick Adds Certification;Vestibular Eval   General Information   Start of Care Date 07/18/18   Referring Physician Laura Gregory   Orders Evaluate and Treat as Indicated   Order Date 07/05/18   Medical Diagnosis Vertigo   Onset of illness/injury or Date of Surgery 06/17/18   Precautions/Limitations no known precautions/limitations   Surgical/Medical history reviewed Yes   Pertinent history of current vestibular problem (include personal factors and/or comorbidities that impact the POC)  Depression;Hearing loss   Pertinent history of current problem (include personal factors and/or comorbidities that impact the POC) Happened in early June and again on June 17 when went into ED. First time he had dizziness was when he turned his head quickly. Dizziness lasted 5 minutes. The 2nd time he was bending over to to put on socks and had dizziness. Stood up and was still dizzy. Sat down and things were a little better. Then went to ER. Lasted about 1.5 hours. Was very nauseous and things were spinning. Spinning lasted about 1 hour. Has a history of motion sickness. Pt states that he had a fusion of C1-C2 about 5 years ago. Has been to the chiropractor recently for neck pain. Has had neck pain for 10 years. Had a very physical job with a lot of lifting. / Comorbidities - Senorineural hearing loss B, Dysthmic disorder, Obesity, Memory loss, Diabetes mellitus Type 2     Prior level of function comment Ind   Diagnostic Tests CT Scan;Other   CT Results Results   CT results Head - IMPRESSION: Normal head CT / Head and Neck - IMPRESSION: Mild atherosclerotic narrowing (less than 50% luminal diameter stenosis) at the origins of the internal carotid arteries on both sides. Otherwise, normal neck and head CTA.   Other diagnostic tests EKG - Unchanged from 3/20/2013   Patient role/Employment history Retired   Patient/Family Goals Statement  No more dizziness   Fall Risk Screen   Fall screen completed by PT   Have you fallen 2 or more times in the past year? No   Have you fallen and had an injury in the past year? No   Is patient a fall risk? No   System Outcome Measures   Dizziness Handicap Inventory (score out of 100) A decrease in score by 17.18 or greater indicates a clinically significant change in symptoms. 8   Palpation   Palpation Hypertonicity of upper trapezius, levator scapular, and cervical paraspinal musculature / Hypomobility of C3-C6   Range of Motion (ROM)   ROM Comment Moderately restriced cervical spine rotation, flexion, extension, and sidebend   Cervicogenic Screen   Vertebral Artery Test Normal   Alar Ligament Test Normal   Transverse Ligament Test Normal   Neck Torsion Test (head still, body rotating) Negative   Neck Torsion Test (head and body rotating) Negative   Oculomotor Exam   Smooth Pursuit Normal   Saccades Normal   VOR Normal   VOR Cancellation Normal   Rapid Head Thrust Normal   Convergence Testing Normal   Infrared Goggle Exam or Frenzel Lense Exam   Vestibular Suppressant in Last 24 Hours? No   Exam completed with Room Light   Spontaneous Nystagmus Negative   Gaze Evoked Nystagmus Negative   Positional testing Negative   [place]pike (right) Negative   [place]-Hallpike (Left) Negative   HSCC Supine Roll Test (Right) Negative   HSCC Supine Roll Test (Left) Negative   Dynamic Visual Acuity (DVA)   Static Acuity (LogMar) Line 7   Horizontal Head Movement at 2 Hz (LogMar) Line 7   Planned Therapy Interventions   Planned Therapy Interventions joint mobilization;ROM;strengthening;stretching;manual therapy;balance training;neuromuscular re-education   Clinical Impression   Criteria for Skilled Therapeutic Interventions Met yes, treatment indicated   PT Diagnosis Dizziness that is likely cervicogenic in origin   Influenced by the following impairments Pain, dizziness, ROM and flexibility deficits   Functional limitations due to  impairments Difficulty looking up or down, putting on socks   Clinical Presentation Stable/Uncomplicated   Clinical Presentation Rationale Several comorbidities impacting PT / 1 body system / Stable   Clinical Decision Making (Complexity) Low complexity   Therapy Frequency 1 time/week   Predicted Duration of Therapy Intervention (days/wks) 8 weeks   Risk & Benefits of therapy have been explained Yes   Patient, Family & other staff in agreement with plan of care Yes   Clinical Impression Comments No symptoms were elicited at today's visit. Due to ongoing neck pain, previous cervical spine fusion, and audible crepitus reported prior to dizziness episodes, it does not appear that the vestibular system is involved.   Education Assessment   Preferred Learning Style Reading;Listening;Demonstration;Pictures/video   Barriers to Learning No barriers   GOALS   PT Eval Goals 1;2;3   Goal 1   Goal Identifier HEP   Goal Description Pt will be independent in HEP in order to achieve and maintain long term treatment goals.   Target Date 08/18/18   Goal 2   Goal Identifier Look down/up   Goal Description Pt will be able to look up and down without an increase in dizziness symptoms.   Target Date 09/12/18   Goal 3   Goal Identifier Put on socks   Goal Description Pt will be able to put on socks with a minimal increase in dizziness noted 1-2/10.   Target Date 09/12/18   Total Evaluation Time   Total Evaluation Time (Minutes) 30   Therapy Certification   Certification date from 07/18/18   Certification date to 09/12/18   Medical Diagnosis Vertigo   Certification I certify the need for these services furnished under this plan of treatment and while under my care.  (Physician co-signature of this document indicates review and certification of the therapy plan).     Alejandro Esteban, PT, DPT

## 2018-07-18 NOTE — PROGRESS NOTES
[facility]        OUTPATIENT PHYSICAL THERAPY FUNCTIONAL EVALUATION  PLAN OF TREATMENT FOR OUTPATIENT REHABILITATION  (COMPLETE FOR INITIAL CLAIMS ONLY)  Patient's Last Name, First Name, M.I.  YOB: 1945  Remington Gutierrez     Provider's Name   [facility]   Medical Record No.  [id number]     Start of Care Date:  07/18/18   Onset Date:  06/17/18   Type:     _X__PT   ____OT  ____SLP Medical Diagnosis:  Vertigo     PT Diagnosis:  Dizziness that is likely cervicogenic in origin Visits from SOC:  1                              __________________________________________________________________________________  Plan of Treatment/Functional Goals:  joint mobilization, ROM, strengthening, stretching, manual therapy, balance training, neuromuscular re-education           GOALS  HEP  Pt will be independent in HEP in order to achieve and maintain long term treatment goals.  08/18/18    Look down/up  Pt will be able to look up and down without an increase in dizziness symptoms.  09/12/18    Put on socks  Pt will be able to put on socks with a minimal increase in dizziness noted 1-2/10.  09/12/18                                                           Therapy Frequency:  1 time/week   Predicted Duration of Therapy Intervention:  8 weeks    Skyler Esteban, PT                                    I CERTIFY THE NEED FOR THESE SERVICES FURNISHED UNDER        THIS PLAN OF TREATMENT AND WHILE UNDER MY CARE     (Physician co-signature of this document indicates review and certification of the therapy plan).                Certification Date From:  07/18/18   Certification Date To:  09/12/18    Referring Provider:  Laura Gregory    Initial Assessment  See Epic Evaluation- Start of Care Date: 07/18/18

## 2018-08-30 ENCOUNTER — DOCUMENTATION ONLY (OUTPATIENT)
Dept: PHYSICAL THERAPY | Facility: CLINIC | Age: 73
End: 2018-08-30

## 2018-08-30 NOTE — PROGRESS NOTES
Physical Therapy Discharge Note    Patient Name: Remington Gutierrez  MR#: [id number]  : 1945  MD name: Laura Gregory  Diagnosis: Vertigo  Eval date: 2018  Reporting period : 2018  Number of visits: 1    Subjective:  Patient attended eval only and did not return.  Pain scale and function unknown due to patient didn't return to PT    Objective:  Current objective measures unknown due to patient didn't return.  Treatment has consisted of Stretching and strengthening exercise instruction / Soft tissue mobilization / Joint mobilizations    Assessment:  Goals not met due to patient didn't return.     Plan:  Discharge with HEP.  Therapist: Alejandro Esteban, PT, DPT

## 2018-11-01 ENCOUNTER — TRANSFERRED RECORDS (OUTPATIENT)
Dept: MULTI SPECIALTY CLINIC | Facility: CLINIC | Age: 73
End: 2018-11-01

## 2018-12-10 ENCOUNTER — TELEPHONE (OUTPATIENT)
Dept: FAMILY MEDICINE | Facility: CLINIC | Age: 73
End: 2018-12-10

## 2018-12-11 NOTE — TELEPHONE ENCOUNTER
"  Panel Management Review      Patient has the following on his problem list:     Diabetes    ASA: Failed    Last A1C  Lab Results   Component Value Date    A1C 6.9 06/26/2018    A1C 6.4 03/22/2017    A1C 6.4 09/27/2016    A1C 8.0 05/04/2016    A1C 6.7 04/29/2015     A1C tested: FAILED    Last LDL:    Lab Results   Component Value Date    CHOL 152 05/01/2014     Lab Results   Component Value Date    HDL 35 05/01/2014     Lab Results   Component Value Date    LDL 45 06/07/2017    LDL 62 05/01/2014     Lab Results   Component Value Date    TRIG 270 05/01/2014     Lab Results   Component Value Date    CHOLHDLRATIO 4.0 05/01/2014     No results found for: NHDL    Is the patient on a Statin? YES             Is the patient on Aspirin? NO    Medications     HMG CoA Reductase Inhibitors    SIMVASTATIN PO          Last three blood pressure readings:  BP Readings from Last 3 Encounters:   06/26/18 123/72   06/17/18 135/88   06/16/17 139/70       Date of last diabetes office visit: 6/26/2018     Tobacco History:     History   Smoking Status     Current Every Day Smoker     Packs/day: 2.00     Years: 25.00     Types: Cigarettes     Last attempt to quit: 1/31/2005   Smokeless Tobacco     Never Used           Composite cancer screening  Chart review shows that this patient is due/due soon for the following None  Summary:    Patient is due/failing the following:   Diabetic check up.    Action needed:   Patient needs office visit for diabetes.    Type of outreach:    Phone, spoke to patient.  \"has to check on insurance problem\".    Questions for provider review:    None                                                                                                                                    Kaitlin Givens MA               "

## 2018-12-21 ENCOUNTER — TRANSFERRED RECORDS (OUTPATIENT)
Dept: HEALTH INFORMATION MANAGEMENT | Facility: CLINIC | Age: 73
End: 2018-12-21

## 2019-05-07 ENCOUNTER — TRANSFERRED RECORDS (OUTPATIENT)
Dept: HEALTH INFORMATION MANAGEMENT | Facility: CLINIC | Age: 74
End: 2019-05-07

## 2019-05-07 LAB
ALT SERPL-CCNC: 24 U/L (ref 13–61)
AST SERPL-CCNC: 18 U/L (ref 15–37)
CHOLEST SERPL-MCNC: 126 MG/DL (ref 0–200)
CREAT SERPL-MCNC: 1 MG/DL (ref 0.7–1.2)
GFR SERPL CREATININE-BSD FRML MDRD: >60 ML/MIN/1.73M2
GLUCOSE SERPL-MCNC: 101 MG/DL (ref 74–106)
HBA1C MFR BLD: 6.6 % (ref 4–6)
HDLC SERPL-MCNC: 37 MG/DL
LDLC SERPL CALC-MCNC: 34 MG/DL
NONHDLC SERPL-MCNC: 89 MG/DL
POTASSIUM SERPL-SCNC: 5.2 MMOL/L (ref 3.5–5)
TRIGL SERPL-MCNC: 276 MG/DL (ref 0–150)
TSH SERPL-ACNC: 1.49 UIU/ML (ref 0.3–5)

## 2019-06-27 ENCOUNTER — TELEPHONE (OUTPATIENT)
Dept: FAMILY MEDICINE | Facility: CLINIC | Age: 74
End: 2019-06-27

## 2019-06-27 DIAGNOSIS — Z12.11 COLON CANCER SCREENING: Primary | ICD-10-CM

## 2019-06-27 NOTE — TELEPHONE ENCOUNTER
Panel Management Review      Patient has the following on his problem list:     Diabetes    ASA: Not Required     Last A1C  Lab Results   Component Value Date    A1C 6.9 06/26/2018    A1C 6.4 03/22/2017    A1C 6.4 09/27/2016    A1C 8.0 05/04/2016    A1C 6.7 04/29/2015     A1C tested: MONITOR    Last LDL:    Lab Results   Component Value Date    CHOL 152 05/01/2014     Lab Results   Component Value Date    HDL 35 05/01/2014     Lab Results   Component Value Date    LDL 45 06/07/2017    LDL 62 05/01/2014     Lab Results   Component Value Date    TRIG 270 05/01/2014     Lab Results   Component Value Date    CHOLHDLRATIO 4.0 05/01/2014     No results found for: NHDL    Is the patient on a Statin? YES             Is the patient on Aspirin? NO    Medications     HMG CoA Reductase Inhibitors     SIMVASTATIN PO             Last three blood pressure readings:  BP Readings from Last 3 Encounters:   06/26/18 123/72   06/17/18 135/88   06/16/17 139/70       Date of last diabetes office visit: 6/28/19     Tobacco History:     History   Smoking Status     Current Every Day Smoker     Packs/day: 2.00     Years: 25.00     Types: Cigarettes     Last attempt to quit: 1/31/2005   Smokeless Tobacco     Never Used           Composite cancer screening  Chart review shows that this patient is due/due soon for the following Colonoscopy and eye exam and phq9  Summary:    Patient is due/failing the following:   Eye exam, COLONOSCOPY and PHQ9    Action needed:   Patient needs referral/order: colonoscopy, eye exam and phq9    Type of outreach:    Phone, left message for patient to call back.     Questions for provider review:    None                                                                                                                                    Ni Estrada MA       Chart routed to Care Team .

## 2019-08-07 ENCOUNTER — OFFICE VISIT (OUTPATIENT)
Dept: FAMILY MEDICINE | Facility: CLINIC | Age: 74
End: 2019-08-07
Payer: MEDICARE

## 2019-08-07 VITALS
SYSTOLIC BLOOD PRESSURE: 122 MMHG | BODY MASS INDEX: 31.13 KG/M2 | TEMPERATURE: 97.6 F | RESPIRATION RATE: 16 BRPM | HEART RATE: 58 BPM | DIASTOLIC BLOOD PRESSURE: 77 MMHG | HEIGHT: 68 IN | OXYGEN SATURATION: 98 % | WEIGHT: 205.4 LBS

## 2019-08-07 DIAGNOSIS — R53.83 FATIGUE, UNSPECIFIED TYPE: Primary | ICD-10-CM

## 2019-08-07 LAB
ALBUMIN SERPL-MCNC: 4.3 G/DL (ref 3.4–5)
ALP SERPL-CCNC: 73 U/L (ref 40–150)
ALT SERPL W P-5'-P-CCNC: 29 U/L (ref 0–70)
ANION GAP SERPL CALCULATED.3IONS-SCNC: 5 MMOL/L (ref 3–14)
AST SERPL W P-5'-P-CCNC: 19 U/L (ref 0–45)
BASOPHILS # BLD AUTO: 0 10E9/L (ref 0–0.2)
BASOPHILS NFR BLD AUTO: 0.4 %
BILIRUB SERPL-MCNC: 0.6 MG/DL (ref 0.2–1.3)
BUN SERPL-MCNC: 27 MG/DL (ref 7–30)
CALCIUM SERPL-MCNC: 9.4 MG/DL (ref 8.5–10.1)
CHLORIDE SERPL-SCNC: 103 MMOL/L (ref 94–109)
CO2 SERPL-SCNC: 27 MMOL/L (ref 20–32)
CREAT SERPL-MCNC: 1.12 MG/DL (ref 0.66–1.25)
DIFFERENTIAL METHOD BLD: ABNORMAL
EOSINOPHIL # BLD AUTO: 0.7 10E9/L (ref 0–0.7)
EOSINOPHIL NFR BLD AUTO: 9.2 %
ERYTHROCYTE [DISTWIDTH] IN BLOOD BY AUTOMATED COUNT: 12.2 % (ref 10–15)
GFR SERPL CREATININE-BSD FRML MDRD: 64 ML/MIN/{1.73_M2}
GLUCOSE SERPL-MCNC: 105 MG/DL (ref 70–99)
HCT VFR BLD AUTO: 47 % (ref 40–53)
HGB BLD-MCNC: 15.6 G/DL (ref 13.3–17.7)
LYMPHOCYTES # BLD AUTO: 2.1 10E9/L (ref 0.8–5.3)
LYMPHOCYTES NFR BLD AUTO: 28 %
MCH RBC QN AUTO: 33.4 PG (ref 26.5–33)
MCHC RBC AUTO-ENTMCNC: 33.2 G/DL (ref 31.5–36.5)
MCV RBC AUTO: 101 FL (ref 78–100)
MONOCYTES # BLD AUTO: 0.6 10E9/L (ref 0–1.3)
MONOCYTES NFR BLD AUTO: 8.4 %
NEUTROPHILS # BLD AUTO: 4.1 10E9/L (ref 1.6–8.3)
NEUTROPHILS NFR BLD AUTO: 54 %
PLATELET # BLD AUTO: 187 10E9/L (ref 150–450)
POTASSIUM SERPL-SCNC: 4.6 MMOL/L (ref 3.4–5.3)
PROT SERPL-MCNC: 7.7 G/DL (ref 6.8–8.8)
RBC # BLD AUTO: 4.67 10E12/L (ref 4.4–5.9)
SODIUM SERPL-SCNC: 135 MMOL/L (ref 133–144)
TSH SERPL DL<=0.005 MIU/L-ACNC: 1.88 MU/L (ref 0.4–4)
WBC # BLD AUTO: 7.5 10E9/L (ref 4–11)

## 2019-08-07 PROCEDURE — 80053 COMPREHEN METABOLIC PANEL: CPT | Performed by: NURSE PRACTITIONER

## 2019-08-07 PROCEDURE — 36415 COLL VENOUS BLD VENIPUNCTURE: CPT | Performed by: NURSE PRACTITIONER

## 2019-08-07 PROCEDURE — 84443 ASSAY THYROID STIM HORMONE: CPT | Performed by: NURSE PRACTITIONER

## 2019-08-07 PROCEDURE — 99213 OFFICE O/P EST LOW 20 MIN: CPT | Performed by: NURSE PRACTITIONER

## 2019-08-07 PROCEDURE — 85025 COMPLETE CBC W/AUTO DIFF WBC: CPT | Performed by: NURSE PRACTITIONER

## 2019-08-07 ASSESSMENT — ANXIETY QUESTIONNAIRES
IF YOU CHECKED OFF ANY PROBLEMS ON THIS QUESTIONNAIRE, HOW DIFFICULT HAVE THESE PROBLEMS MADE IT FOR YOU TO DO YOUR WORK, TAKE CARE OF THINGS AT HOME, OR GET ALONG WITH OTHER PEOPLE: NOT DIFFICULT AT ALL
GAD7 TOTAL SCORE: 0
1. FEELING NERVOUS, ANXIOUS, OR ON EDGE: NOT AT ALL
3. WORRYING TOO MUCH ABOUT DIFFERENT THINGS: NOT AT ALL
6. BECOMING EASILY ANNOYED OR IRRITABLE: NOT AT ALL
5. BEING SO RESTLESS THAT IT IS HARD TO SIT STILL: NOT AT ALL
2. NOT BEING ABLE TO STOP OR CONTROL WORRYING: NOT AT ALL
7. FEELING AFRAID AS IF SOMETHING AWFUL MIGHT HAPPEN: NOT AT ALL

## 2019-08-07 ASSESSMENT — PATIENT HEALTH QUESTIONNAIRE - PHQ9
SUM OF ALL RESPONSES TO PHQ QUESTIONS 1-9: 3
5. POOR APPETITE OR OVEREATING: NOT AT ALL

## 2019-08-07 ASSESSMENT — MIFFLIN-ST. JEOR: SCORE: 1641.19

## 2019-08-07 NOTE — PROGRESS NOTES
Remington Gutierrez is a 73 year old male who presents to clinic today for the following health issues:    HPI   Fatigue      Duration: 1 month     Description (location/character/radiation): very tired, lack of energy     Intensity:  moderate    Accompanying signs and symptoms: thinks maybe too much sugar     History (similar episodes/previous evaluation): None    Precipitating or alleviating factors: None    Therapies tried and outcome: None    No shortness of breath or chest pain    No muscular or joint pain    No skin changes    BM's normal with no black or bloody stools    No recent illness    Hx of depression on Paxil     PHQ-9 SCORE 2017   PHQ-9 Total Score - - -   PHQ-9 Total Score 0 0 3     BUBBA-7 SCORE 2017   Total Score 1 0 0     Patient Active Problem List   Diagnosis     Sensorineural Hearing Loss, Bilateral     Dysthymic disorder     Obstructive sleep apnea     Osteoarthritis     Obesity     Memory loss     Backache     HYPERLIPIDEMIA LDL GOAL <100     Advanced directives, counseling/discussion     Type 2 diabetes mellitus without complication, without long-term current use of insulin (H)     Past Surgical History:   Procedure Laterality Date     COLONOSCOPY  03     ORTHOPEDIC SURGERY       SURGICAL HISTORY OF -       Arthroscopy (L) Knee      SURGICAL HISTORY OF -       left TKT     SURGICAL HISTORY OF -   10/08    Right THR       Social History     Tobacco Use     Smoking status: Current Every Day Smoker     Packs/day: 2.00     Years: 25.00     Pack years: 50.00     Types: Cigarettes     Last attempt to quit: 2005     Years since quittin.5     Smokeless tobacco: Never Used   Substance Use Topics     Alcohol use: Yes     Comment: very little     Family History   Problem Relation Age of Onset     Respiratory Mother         copd     Heart Disease Mother      Cardiovascular Mother 75        MI     Coronary Artery Disease Mother       Cancer Father         kidney     Arthritis Brother      Cardiovascular Brother      Chronic Obstructive Pulmonary Disease Brother      Rheumatoid Arthritis Brother      Hyperlipidemia Brother      Allergies Sister      Depression Sister      Coronary Artery Disease Maternal Grandmother      Cerebrovascular Disease Brother      Diabetes Brother      Hypertension Brother      Hyperlipidemia Brother      Prostate Cancer Brother      Cerebrovascular Disease Brother      Hyperlipidemia Brother      Depression Brother      Family History Negative No family hx of          Current Outpatient Medications   Medication Sig Dispense Refill     ALPRAZOLAM PO Take 0.25 mg by mouth 2 times daily as needed for anxiety       DONEPEZIL HCL PO Take 10 mg by mouth daily       DOXYCYCLINE HYCLATE PO Take 50 mg by mouth daily       LAMOTRIGINE PO Take 150 mg by mouth 2 times daily       lisinopril (PRINIVIL/ZESTRIL) 10 MG tablet Take 1 tablet (10 mg) by mouth daily 90 tablet 3     meclizine (ANTIVERT) 12.5 MG tablet Take 1 tablet (12.5 mg) by mouth 4 times daily as needed for dizziness 12 tablet 0     MELOXICAM PO Take 15 mg by mouth daily       metFORMIN (GLUCOPHAGE) 500 MG tablet 1 tabs with breakfast and 1 tablet evening meal. 180 tablet 3     PAROXETINE HCL PO Take 40 mg by mouth daily       SIMVASTATIN PO Take 10 mg by mouth At Bedtime       zolpidem (AMBIEN CR) 12.5 MG CR tablet Take 1 tablet (12.5 mg) by mouth nightly as needed for sleep 30 tablet 5     Allergies   Allergen Reactions     Neomycin Rash     Reviewed and updated as needed this visit by Provider  Tobacco  Allergies  Meds  Problems  Med Hx  Surg Hx  Fam Hx       Review of Systems   ROS COMP: CONSTITUTIONAL:POSITIVE  for fatigue  INTEGUMENTARY/SKIN: NEGATIVE for worrisome rashes, moles or lesions  ENT/MOUTH: NEGATIVE for ear, mouth and throat problems  RESP: NEGATIVE for significant cough or SOB  CV: NEGATIVE for chest pain, palpitations or peripheral edema  GI:  "NEGATIVE for nausea, abdominal pain, heartburn, or change in bowel habits  MUSCULOSKELETAL: NEGATIVE for significant arthralgias or myalgia  PSYCHIATRIC: NEGATIVE for changes in mood or affect  ROS otherwise negative      Objective    /77   Pulse 58   Temp 97.6  F (36.4  C) (Tympanic)   Resp 16   Ht 1.727 m (5' 8\")   Wt 93.2 kg (205 lb 6.4 oz)   SpO2 98%   BMI 31.23 kg/m    Body mass index is 31.23 kg/m .  Physical Exam   GENERAL: healthy, alert and no distress  HENT: ear canals and TM's normal, nose and mouth without ulcers or lesions  NECK: no adenopathy, no asymmetry, masses, or scars and thyroid normal to palpation  RESP: lungs clear to auscultation - no rales, rhonchi or wheezes  CV: regular rate and rhythm, normal S1 S2, no S3 or S4, no murmur, click or rub, no peripheral edema and peripheral pulses strong  MS: no gross musculoskeletal defects noted, no edema  NEURO: Normal strength and tone, mentation intact and speech normal  PSYCH: mentation appears normal, affect normal/bright    Diagnostic Test Results:  Labs reviewed in Epic        Assessment & Plan     1. Fatigue, unspecified type  Labs today.  Recommend multivitamin daily for B complex and D vitamins.  I will call with results tomorrow.  Increase on anxiety score but admits to increase stress with JAIDEN stuff.  PHQ-9 and GAD7 scores are good today.  I do not believe that this is related to depression/anxiety at this time.  Recommendations pending lab results.  - TSH with free T4 reflex  - CBC with platelets and differential  - Comprehensive metabolic panel (BMP + Alb, Alk Phos, ALT, AST, Total. Bili, TP)     Recommended that [facility] and [facility] exam from the eye doctor for our chart here at his medicare wellness visit.    See Patient Instructions    Return in about 1 month (around 9/7/2019) for Routine Visit.    Pauly Nolasco NP  [facility]      "

## 2019-08-07 NOTE — NURSING NOTE
"Chief Complaint   Patient presents with     Fatigue     Health Maintenance       Initial There were no vitals taken for this visit. Estimated body mass index is 32.08 kg/m  as calculated from the following:    Height as of 6/26/18: 1.727 m (5' 8\").    Weight as of 6/26/18: 95.7 kg (211 lb).    Patient presents to the clinic using No DME    Health Maintenance that is potentially due pending provider review:  FIT test has been sent in. PHI,  Phq9,  janes 7 and fall risk completed today.      Is there anyone who you would like to be able to receive your results? No  If yes have patient fill out VICKIE  Jayna Ventura CMA       "

## 2019-08-07 NOTE — PATIENT INSTRUCTIONS
1.  Bring copies of eye exam, labs (A1C, Lipids, Microabumine urine)  2.  Lab today, I will call you with results tomorrow.  3.  Unknown cause for fatigue at this time.  Recommend Multivitamin daily for B complex an vit d for fatigue.

## 2019-08-07 NOTE — LETTER
August 8, 2019      Remington Gutierrez  [address] [address] [place] [id number]        Dear ,    We are writing to inform you of your test results.    All of your labs are normal. Uncertain cause of fatigue. You can discuss [facility] as cause but nothing concerning at this time. Your blood sugar was normal and not low, it was a pleasure taking care of you.     Resulted Orders   TSH with free T4 reflex   Result Value Ref Range    TSH 1.88 0.40 - 4.00 mU/L   CBC with platelets and differential   Result Value Ref Range    WBC 7.5 4.0 - 11.0 10e9/L    RBC Count 4.67 4.4 - 5.9 10e12/L    Hemoglobin 15.6 13.3 - 17.7 g/dL    Hematocrit 47.0 40.0 - 53.0 %     (H) 78 - 100 fl    MCH 33.4 (H) 26.5 - 33.0 pg    MCHC 33.2 31.5 - 36.5 g/dL    RDW 12.2 10.0 - 15.0 %    Platelet Count 187 150 - 450 10e9/L    % Neutrophils 54.0 %    % Lymphocytes 28.0 %    % Monocytes 8.4 %    % Eosinophils 9.2 %    % Basophils 0.4 %    Absolute Neutrophil 4.1 1.6 - 8.3 10e9/L    Absolute Lymphocytes 2.1 0.8 - 5.3 10e9/L    Absolute Monocytes 0.6 0.0 - 1.3 10e9/L    Absolute Eosinophils 0.7 0.0 - 0.7 10e9/L    Absolute Basophils 0.0 0.0 - 0.2 10e9/L    Diff Method Automated Method    Comprehensive metabolic panel (BMP + Alb, Alk Phos, ALT, AST, Total. Bili, TP)   Result Value Ref Range    Sodium 135 133 - 144 mmol/L    Potassium 4.6 3.4 - 5.3 mmol/L    Chloride 103 94 - 109 mmol/L    Carbon Dioxide 27 20 - 32 mmol/L    Anion Gap 5 3 - 14 mmol/L    Glucose 105 (H) 70 - 99 mg/dL      Comment:      Non Fasting    Urea Nitrogen 27 7 - 30 mg/dL    Creatinine 1.12 0.66 - 1.25 mg/dL    GFR Estimate 64 >60 mL/min/[1.73_m2]      Comment:      Non  GFR Calc  Starting 12/18/2018, serum creatinine based estimated GFR (eGFR) will be   calculated using the Chronic Kidney Disease Epidemiology Collaboration   (CKD-EPI) equation.      GFR Estimate If Black 74 >60 mL/min/[1.73_m2]      Comment:        GFR Calc  Starting 12/18/2018, serum creatinine based estimated GFR (eGFR) will be   calculated using the Chronic Kidney Disease Epidemiology Collaboration   (CKD-EPI) equation.      Calcium 9.4 8.5 - 10.1 mg/dL    Bilirubin Total 0.6 0.2 - 1.3 mg/dL    Albumin 4.3 3.4 - 5.0 g/dL    Protein Total 7.7 6.8 - 8.8 g/dL    Alkaline Phosphatase 73 40 - 150 U/L    ALT 29 0 - 70 U/L    AST 19 0 - 45 U/L       If you have any questions or concerns, please call the clinic at the number listed above.       Sincerely,        Paluy Nolasco NP

## 2019-08-08 ASSESSMENT — ANXIETY QUESTIONNAIRES: GAD7 TOTAL SCORE: 0

## 2019-09-01 ENCOUNTER — TRANSFERRED RECORDS (OUTPATIENT)
Dept: HEALTH INFORMATION MANAGEMENT | Facility: CLINIC | Age: 74
End: 2019-09-01

## 2019-09-01 LAB — RETINOPATHY: NORMAL

## 2019-09-23 ENCOUNTER — OFFICE VISIT (OUTPATIENT)
Dept: FAMILY MEDICINE | Facility: CLINIC | Age: 74
End: 2019-09-23
Payer: MEDICARE

## 2019-09-23 VITALS
WEIGHT: 218.4 LBS | BODY MASS INDEX: 33.21 KG/M2 | DIASTOLIC BLOOD PRESSURE: 62 MMHG | TEMPERATURE: 98.2 F | HEART RATE: 74 BPM | SYSTOLIC BLOOD PRESSURE: 122 MMHG | OXYGEN SATURATION: 94 %

## 2019-09-23 DIAGNOSIS — J40 BRONCHITIS: Primary | ICD-10-CM

## 2019-09-23 PROCEDURE — 99213 OFFICE O/P EST LOW 20 MIN: CPT | Performed by: FAMILY MEDICINE

## 2019-09-23 RX ORDER — AZITHROMYCIN 250 MG/1
TABLET, FILM COATED ORAL
Qty: 6 TABLET | Refills: 0 | Status: SHIPPED | OUTPATIENT
Start: 2019-09-23 | End: 2020-01-06

## 2019-09-23 NOTE — PROGRESS NOTES
Subjective     Remington Gutierrez is a 73 year old male who presents to clinic today for the following health issues:    HPI   RESPIRATORY SYMPTOMS      Duration: 3 days     Description  nasal congestion, rhinorrhea, cough, wheezing, fever and nausea    Severity: moderate    Accompanying signs and symptoms: None    History (predisposing factors):  none    Precipitating or alleviating factors: None    Therapies tried and outcome:  Co advil and alkszzer pllus            Reviewed and updated as needed this visit by Provider         Review of Systems         Objective    /62 (Cuff Size: Adult Regular)   Pulse 74   Temp 98.2  F (36.8  C) (Tympanic)   Wt 99.1 kg (218 lb 6.4 oz)   SpO2 94%   BMI 33.21 kg/m    Body mass index is 33.21 kg/m .  Physical Exam               Past Medical History:   Diagnosis Date     BACKACHE NOS 7/12/2007    Injured back bending over to  potted plant -  Lidoderm patch helpful.   Xray of thoracic and lumbar spine shows arthritic change and mild scoliosis       Current Outpatient Medications   Medication Sig Dispense Refill     ALPRAZOLAM PO Take 0.25 mg by mouth 2 times daily as needed for anxiety       azithromycin (ZITHROMAX) 250 MG tablet 2 pills on day 1 then one pill daily 6 tablet 0     DONEPEZIL HCL PO Take 10 mg by mouth daily       DOXYCYCLINE HYCLATE PO Take 50 mg by mouth daily       LAMOTRIGINE PO Take 150 mg by mouth 2 times daily       lisinopril (PRINIVIL/ZESTRIL) 10 MG tablet Take 1 tablet (10 mg) by mouth daily 90 tablet 3     meclizine (ANTIVERT) 12.5 MG tablet Take 1 tablet (12.5 mg) by mouth 4 times daily as needed for dizziness 12 tablet 0     MELOXICAM PO Take 15 mg by mouth daily       metFORMIN (GLUCOPHAGE) 500 MG tablet 1 tabs with breakfast and 1 tablet evening meal. 180 tablet 3     PAROXETINE HCL PO Take 40 mg by mouth daily       SIMVASTATIN PO Take 10 mg by mouth At Bedtime       zolpidem (AMBIEN CR) 12.5 MG CR tablet Take 1 tablet (12.5 mg) by  mouth nightly as needed for sleep 30 tablet 5       Allergies   Allergen Reactions     Neomycin Rash       Social History     Tobacco Use     Smoking status: Current Every Day Smoker     Packs/day: 2.00     Years: 25.00     Pack years: 50.00     Types: Cigarettes     Last attempt to quit: 2005     Years since quittin.6     Smokeless tobacco: Never Used   Substance Use Topics     Alcohol use: Yes     Comment: very little       OBJECTIVE:  /62 (Cuff Size: Adult Regular)   Pulse 74   Temp 98.2  F (36.8  C) (Tympanic)   Wt 99.1 kg (218 lb 6.4 oz)   SpO2 94%   BMI 33.21 kg/m    General appearance: healthy, alert, no acute distress  Ears: R TM - normal: no effusions, no erythema, and normal landmarks, L TM - normal: no effusions, no erythema, and normal landmarks  Nose: normal  Oropharynx: normal  Neck: supple without adenopathy and thyroid normal to palpation  Lungs: diffuse rhonchi  Heart: regular rhythm and rate without murmur and peripheral pulses strong and symmetric    ASSESSMENT:  Bronchitis    PLAN:  1)  Rest, fluids, acetaminophen, and humidification.  2)  Recheck prn persistence, worsening, appearance of new symptoms.    Orders Placed This Encounter     azithromycin (ZITHROMAX) 250 MG tablet

## 2019-09-23 NOTE — PATIENT INSTRUCTIONS
Our Clinic hours are:  Mondays    7:20 am - 7 pm  Tues -  Fri  7:20 am - 5 pm    Clinic Phone: [phone]    The clinic lab opens at 7:30 am Mon - Fri and appointments are required.    [facility]. [phone]  Monday  8 am - 7pm  Tues - Fri 8 am - 5:30 pm

## 2019-09-25 ENCOUNTER — NURSE TRIAGE (OUTPATIENT)
Dept: NURSING | Facility: CLINIC | Age: 74
End: 2019-09-25

## 2019-09-26 NOTE — TELEPHONE ENCOUNTER
Rachel (significant other) reports patient was prescribed Azithromycin for bronchitis. Caller is wondering if he can also take Aleve or any other cold medications. Transferred to pharmacy to speak with a pharmacist.     Cass John RN/[facility]    Reason for Disposition    Caller has NON-URGENT medication question about med that PCP prescribed and triager unable to answer question    Protocols used: MEDICATION QUESTION CALL-A-AH

## 2020-01-06 ENCOUNTER — OFFICE VISIT (OUTPATIENT)
Dept: FAMILY MEDICINE | Facility: CLINIC | Age: 75
End: 2020-01-06
Payer: MEDICARE

## 2020-01-06 ENCOUNTER — ANCILLARY PROCEDURE (OUTPATIENT)
Dept: GENERAL RADIOLOGY | Facility: CLINIC | Age: 75
End: 2020-01-06
Attending: FAMILY MEDICINE
Payer: MEDICARE

## 2020-01-06 VITALS
TEMPERATURE: 98.5 F | DIASTOLIC BLOOD PRESSURE: 74 MMHG | BODY MASS INDEX: 33.04 KG/M2 | HEIGHT: 68 IN | HEART RATE: 58 BPM | RESPIRATION RATE: 22 BRPM | WEIGHT: 218 LBS | SYSTOLIC BLOOD PRESSURE: 126 MMHG | OXYGEN SATURATION: 98 %

## 2020-01-06 DIAGNOSIS — R50.9 FEVER, UNSPECIFIED FEVER CAUSE: ICD-10-CM

## 2020-01-06 DIAGNOSIS — R06.09 DOE (DYSPNEA ON EXERTION): ICD-10-CM

## 2020-01-06 DIAGNOSIS — J44.1 COPD EXACERBATION (H): Primary | ICD-10-CM

## 2020-01-06 LAB
BASOPHILS # BLD AUTO: 0 10E9/L (ref 0–0.2)
BASOPHILS NFR BLD AUTO: 0.2 %
DIFFERENTIAL METHOD BLD: ABNORMAL
EOSINOPHIL # BLD AUTO: 0.3 10E9/L (ref 0–0.7)
EOSINOPHIL NFR BLD AUTO: 5 %
ERYTHROCYTE [DISTWIDTH] IN BLOOD BY AUTOMATED COUNT: 11.7 % (ref 10–15)
HCT VFR BLD AUTO: 41.9 % (ref 40–53)
HGB BLD-MCNC: 13.7 G/DL (ref 13.3–17.7)
LYMPHOCYTES # BLD AUTO: 1.2 10E9/L (ref 0.8–5.3)
LYMPHOCYTES NFR BLD AUTO: 18.9 %
MCH RBC QN AUTO: 32.5 PG (ref 26.5–33)
MCHC RBC AUTO-ENTMCNC: 32.7 G/DL (ref 31.5–36.5)
MCV RBC AUTO: 99 FL (ref 78–100)
MONOCYTES # BLD AUTO: 0.4 10E9/L (ref 0–1.3)
MONOCYTES NFR BLD AUTO: 6.6 %
NEUTROPHILS # BLD AUTO: 4.5 10E9/L (ref 1.6–8.3)
NEUTROPHILS NFR BLD AUTO: 69.3 %
PLATELET # BLD AUTO: 176 10E9/L (ref 150–450)
RBC # BLD AUTO: 4.22 10E12/L (ref 4.4–5.9)
WBC # BLD AUTO: 6.6 10E9/L (ref 4–11)

## 2020-01-06 PROCEDURE — 94640 AIRWAY INHALATION TREATMENT: CPT | Performed by: FAMILY MEDICINE

## 2020-01-06 PROCEDURE — 71046 X-RAY EXAM CHEST 2 VIEWS: CPT | Mod: FY

## 2020-01-06 PROCEDURE — 99214 OFFICE O/P EST MOD 30 MIN: CPT | Mod: 25 | Performed by: FAMILY MEDICINE

## 2020-01-06 PROCEDURE — 36415 COLL VENOUS BLD VENIPUNCTURE: CPT | Performed by: FAMILY MEDICINE

## 2020-01-06 PROCEDURE — 85025 COMPLETE CBC W/AUTO DIFF WBC: CPT | Performed by: FAMILY MEDICINE

## 2020-01-06 RX ORDER — DOXYCYCLINE HYCLATE 100 MG
100 TABLET ORAL 2 TIMES DAILY
Qty: 20 TABLET | Refills: 0 | Status: SHIPPED | OUTPATIENT
Start: 2020-01-06 | End: 2020-01-13

## 2020-01-06 RX ORDER — PREDNISONE 20 MG/1
40 TABLET ORAL DAILY
Qty: 10 TABLET | Refills: 0 | Status: SHIPPED | OUTPATIENT
Start: 2020-01-06 | End: 2020-01-13

## 2020-01-06 RX ORDER — IPRATROPIUM BROMIDE AND ALBUTEROL SULFATE 2.5; .5 MG/3ML; MG/3ML
3 SOLUTION RESPIRATORY (INHALATION) ONCE
Status: COMPLETED | OUTPATIENT
Start: 2020-01-06 | End: 2020-01-06

## 2020-01-06 RX ADMIN — IPRATROPIUM BROMIDE AND ALBUTEROL SULFATE 3 ML: 2.5; .5 SOLUTION RESPIRATORY (INHALATION) at 15:07

## 2020-01-06 ASSESSMENT — PAIN SCALES - GENERAL
PAINLEVEL: SEVERE PAIN (6)
PAINLEVEL: MILD PAIN (3)

## 2020-01-06 ASSESSMENT — MIFFLIN-ST. JEOR: SCORE: 1693.34

## 2020-01-06 NOTE — Clinical Note
Please abstract the following data from this visit with this patient into the appropriate field in Epic:Tests that can be patient reported without a hard copy:[facility] exam with ophthal[facility] on this date[facility] found in the [facility]'s chart through Chart Review/Care Everywhere:Note to Abstraction: If this section is blank, no results were found via Chart Review/Care Everywhere.

## 2020-01-06 NOTE — PATIENT INSTRUCTIONS
Suspect COPD exacerbation.   Prednisone 40 mg daily for 5 days    Doxycycline 100 mg twice daily for 10 days    Chest xray shows no pneumonia. White blood counts is normal.       Duoneb (nebulizer) given in clinic with some improvement.  [facility] about [facility] for the Moderate to severe COPD they found on your pulmonary function testing.       If shortness of breath is worsening or symptoms worsen, you should follow up in clinic or the ER.     Recommend echocardiogram (ultrasound of the heart) [phone] - you can check with the [facility] on coverage for this - concern over the shortness of breath that has lasted several months.      Results for orders placed or performed in visit on 01/06/20   CBC with platelets differential     Status: Abnormal   Result Value Ref Range    WBC 6.6 4.0 - 11.0 10e9/L    RBC Count 4.22 (L) 4.4 - 5.9 10e12/L    Hemoglobin 13.7 13.3 - 17.7 g/dL    Hematocrit 41.9 40.0 - 53.0 %    MCV 99 78 - 100 fl    MCH 32.5 26.5 - 33.0 pg    MCHC 32.7 31.5 - 36.5 g/dL    RDW 11.7 10.0 - 15.0 %    Platelet Count 176 150 - 450 10e9/L    % Neutrophils 69.3 %    % Lymphocytes 18.9 %    % Monocytes 6.6 %    % Eosinophils 5.0 %    % Basophils 0.2 %    Absolute Neutrophil 4.5 1.6 - 8.3 10e9/L    Absolute Lymphocytes 1.2 0.8 - 5.3 10e9/L    Absolute Monocytes 0.4 0.0 - 1.3 10e9/L    Absolute Eosinophils 0.3 0.0 - 0.7 10e9/L    Absolute Basophils 0.0 0.0 - 0.2 10e9/L    Diff Method Automated Method      CHEST TWO VIEWS  1/6/2020 2:00 PM      HISTORY: Dyspnea, cough for two months. Fever, unspecified fever  cause.     COMPARISON: March 20, 2013                                                                       IMPRESSION:  There are no acute infiltrates. The cardiac silhouette is  not enlarged. Pulmonary vasculature is unremarkable.         Our Clinic hours are:  Mondays    7:20 am - 7 pm  Tues -  Fri  7:20 am - 5 pm    Clinic Phone: [phone]    The clinic lab opens at 7:30 am  Mon - Fri and appointments are required.    [facility]  Ph. [phone]  Monday  8 am - 7pm  Tues - Fri 8 am - 5:30 pm

## 2020-01-06 NOTE — PROGRESS NOTES
"Subjective     Remington Les Gutierrez is a 74 year old male who presents to clinic today for the following health issues:    HPI   Acute Illness   Acute illness concerns: short of breath, fevers, cough  Onset: 1 month    Fever: YES    Chills/Sweats: YES- sweaty    Headache (location?): YES    Sinus Pressure:no    Conjunctivitis:  no    Ear Pain: no    Rhinorrhea: YES    Congestion: YES    Sore Throat: YES     Cough: YES-productive of brownish green sputum    Wheeze: YES    Decreased Appetite: YES    Nausea: YES- from choking on phlegm     Vomiting: NO    Diarrhea:  YES a week no diarrhea    Dysuria/Freq.: no    Fatigue/Achiness: YES    Sick/Strep Exposure: YES-      Therapies Tried and outcome: Coricidin, sinus rinse    Symptoms worse in the past 3-4 days, more short of breath.   No chest pain/pressure. Feels quite congested.      [facility] - [facility].  That didn't work.        PMH:  Patient reports some short term memory loss.  He has a [facility] of information [facility] the [facility] with some recent testing and lab work.  In October 2019 he had PFTs that showed mod-severe obstruction c/w COPD.  He also had a Valdo protocol stress test that was terminated due to fatigue but normal.  Blood work in October was unremarkable.    He is on no inhalers or specific treatment for the COPD.      He is concerned about the cost of medications and believes that the only place he can get medications is the [facility] or a specific [facility] in [facility].       Reviewed and updated as needed this visit by Provider         Review of Systems   ROS COMP: Constitutional, HEENT, cardiovascular, pulmonary, gi and gu systems are negative, except as otherwise noted.      Objective    /74   Pulse 63   Temp 98.5  F (36.9  C) (Tympanic)   Resp 22   Ht 1.727 m (5' 8\")   Wt 98.9 kg (218 lb)   SpO2 98%   BMI 33.15 kg/m    Body mass index is 33.15 kg/m .  Physical Exam   GENERAL: healthy, alert and appears slightly " dyspneic at rest  HENT: ear canals and TM's normal, nose and mouth without ulcers or lesions  NECK: no adenopathy, no asymmetry, masses, or scars and thyroid normal to palpation  RESP: lungs clear to auscultation - no rales, rhonchi or wheezes. Seems to move more air after a duoneb but there are no wheezes or rhonci.  CV: regular rate and rhythm, normal S1 S2, no S3 or S4, no murmur, click or rub, no peripheral edema and peripheral pulses strong  ABDOMEN: soft, nontender, no hepatosplenomegaly, no masses and bowel sounds normal  MS: no gross musculoskeletal defects noted, no edema    Diagnostic Test Results:  Labs reviewed in Epic  Results for orders placed or performed in visit on 01/06/20 (from the past 24 hour(s))   CBC with platelets differential   Result Value Ref Range    WBC 6.6 4.0 - 11.0 10e9/L    RBC Count 4.22 (L) 4.4 - 5.9 10e12/L    Hemoglobin 13.7 13.3 - 17.7 g/dL    Hematocrit 41.9 40.0 - 53.0 %    MCV 99 78 - 100 fl    MCH 32.5 26.5 - 33.0 pg    MCHC 32.7 31.5 - 36.5 g/dL    RDW 11.7 10.0 - 15.0 %    Platelet Count 176 150 - 450 10e9/L    % Neutrophils 69.3 %    % Lymphocytes 18.9 %    % Monocytes 6.6 %    % Eosinophils 5.0 %    % Basophils 0.2 %    Absolute Neutrophil 4.5 1.6 - 8.3 10e9/L    Absolute Lymphocytes 1.2 0.8 - 5.3 10e9/L    Absolute Monocytes 0.4 0.0 - 1.3 10e9/L    Absolute Eosinophils 0.3 0.0 - 0.7 10e9/L    Absolute Basophils 0.0 0.0 - 0.2 10e9/L    Diff Method Automated Method    XR Chest 2 Views    Narrative    CHEST TWO VIEWS  1/6/2020 2:00 PM     HISTORY: Dyspnea, cough for two months. Fever, unspecified fever  cause.    COMPARISON: March 20, 2013       Impression    IMPRESSION:  There are no acute infiltrates. The cardiac silhouette is  not enlarged. Pulmonary vasculature is unremarkable.     BENJAMIN CANTU MD           Assessment & Plan     ASSESSMENT/PLAN:      ICD-10-CM    1. Fever, unspecified fever cause R50.9 XR Chest 2 Views     CBC with platelets differential   2. FERRERA  "(dyspnea on exertion) R06.09 Echocardiogram Complete     INHALATION/NEBULIZER TREATMENT, INITIAL   3. COPD exacerbation (H) J44.1 ipratropium - albuterol 0.5 mg/2.5 mg/3 mL (DUONEB) neb solution 3 mL     INHALATION/NEBULIZER TREATMENT, INITIAL     predniSONE (DELTASONE) 20 MG tablet     doxycycline hyclate (VIBRA-TABS) 100 MG tablet   suspect COPD exacerbation, though can't exclude influenza like illness.  Given the prolonged course of the dyspnea however, consider other sources (CHF, diastolic dysfunction, etc).  His COPD needs to be better optimized but he doesn't want me to prescribe anything due to cost. Refused nebulizer/duonebs to go home with.      Patient Instructions     Suspect COPD exacerbation.   Prednisone 40 mg daily for 5 days    Doxycycline 100 mg twice daily for 10 days    Chest xray shows no pneumonia. White blood counts is normal.       Duoneb (nebulizer) given in clinic with some improvement.  [facility] about [facility]treatment for the Moderate to severe COPD they found on your pulmonary function testing.       If shortness of breath is worsening or symptoms worsen, you should follow up in clinic or the ER.     Recommend echocardiogram (ultrasound of the heart) [phone] - [facility] can [facility] on [facility] this - concern over the shortness of breath that has lasted several months.       Tobacco Cessation:   reports that he has been smoking cigarettes. He has a 50.00 pack-year smoking history. He has never used smokeless tobacco.  Patient tells me he is not smoking at this time.        BMI:   Estimated body mass index is 33.15 kg/m  as calculated from the following:    Height as of this encounter: 1.727 m (5' 8\").    Weight as of this encounter: 98.9 kg (218 lb).       No follow-ups on file.    Corinna Estrada MD  [facility]      "

## 2020-01-13 ENCOUNTER — OFFICE VISIT (OUTPATIENT)
Dept: FAMILY MEDICINE | Facility: CLINIC | Age: 75
End: 2020-01-13
Payer: MEDICARE

## 2020-01-13 VITALS
HEIGHT: 68 IN | SYSTOLIC BLOOD PRESSURE: 138 MMHG | RESPIRATION RATE: 16 BRPM | DIASTOLIC BLOOD PRESSURE: 82 MMHG | OXYGEN SATURATION: 98 % | BODY MASS INDEX: 33.46 KG/M2 | TEMPERATURE: 96.5 F | HEART RATE: 66 BPM | WEIGHT: 220.8 LBS

## 2020-01-13 DIAGNOSIS — J01.90 ACUTE SINUSITIS WITH SYMPTOMS > 10 DAYS: Primary | ICD-10-CM

## 2020-01-13 PROBLEM — J44.1 COPD EXACERBATION (H): Status: ACTIVE | Noted: 2020-01-13

## 2020-01-13 PROCEDURE — 99213 OFFICE O/P EST LOW 20 MIN: CPT | Performed by: NURSE PRACTITIONER

## 2020-01-13 ASSESSMENT — MIFFLIN-ST. JEOR: SCORE: 1706.04

## 2020-01-13 NOTE — PROGRESS NOTES
Subjective     Remington Gutierrez is a 74 year old male who presents to clinic today for the following health issues:    HPI   ENT Symptoms             Symptoms: cc Present Absent Comment   Fever/Chills  x  Low grade    Fatigue x x  No energy   Muscle Aches   x    Eye Irritation   x    Sneezing  x     Nasal Pedro/Drg  x  Purulent and bleeding   Sinus Pressure/Pain x x  bilateral   Loss of smell  x     Dental pain   x    Sore Throat   x    Swollen Glands   x    Ear Pain/Fullness   x    Cough  x  Productive, choking on phlegm, post nasal drainage   Wheeze  x     Chest Pain   x    Shortness of breath  x  COPD- CXR unremarkable at last visit. No hx of CHF. Denies peripheral edema or orthopnea.   Rash   x    Other         Symptom duration:  5 weeks    Symptom severity:  moderate    Treatments tried:  neti pot, coricidan, doxy and prednisone    Contacts:  works with special needs            Patient Active Problem List   Diagnosis     Sensorineural Hearing Loss, Bilateral     Dysthymic disorder     Obstructive sleep apnea     Osteoarthritis     Obesity     Memory loss     Backache     HYPERLIPIDEMIA LDL GOAL <100     Advanced directives, counseling/discussion     Type 2 diabetes mellitus without complication, without long-term current use of insulin (H)     COPD exacerbation (H)     Past Surgical History:   Procedure Laterality Date     COLONOSCOPY  03     ORTHOPEDIC SURGERY       SURGICAL HISTORY OF -       Arthroscopy (L) Knee      SURGICAL HISTORY OF -       left TKT     SURGICAL HISTORY OF -   10/08    Right THR       Social History     Tobacco Use     Smoking status: Current Every Day Smoker     Packs/day: 2.00     Years: 25.00     Pack years: 50.00     Types: Cigarettes     Last attempt to quit: 2005     Years since quittin.9     Smokeless tobacco: Never Used   Substance Use Topics     Alcohol use: Yes     Comment: very little     Family History   Problem Relation Age of Onset     Respiratory  "Mother         copd     Heart Disease Mother      Cardiovascular Mother 75        MI     Coronary Artery Disease Mother      Cancer Father         kidney     Arthritis Brother      Cardiovascular Brother      Chronic Obstructive Pulmonary Disease Brother      Rheumatoid Arthritis Brother      Hyperlipidemia Brother      Allergies Sister      Depression Sister      Coronary Artery Disease Maternal Grandmother      Cerebrovascular Disease Brother      Diabetes Brother      Hypertension Brother      Hyperlipidemia Brother      Prostate Cancer Brother      Cerebrovascular Disease Brother      Hyperlipidemia Brother      Depression Brother      Family History Negative No family hx of              Reviewed and updated as needed this visit by Provider         Review of Systems   ROS COMP: Constitutional, HEENT, cardiovascular, pulmonary, gi and gu systems are negative, except as otherwise noted.      Objective    /82 (BP Location: Right arm, Patient Position: Sitting, Cuff Size: Adult Regular)   Pulse 66   Temp 96.5  F (35.8  C) (Tympanic)   Resp 16   Ht 1.727 m (5' 8\")   Wt 100.2 kg (220 lb 12.8 oz)   SpO2 98%   BMI 33.57 kg/m    Body mass index is 33.57 kg/m .  Physical Exam   GENERAL: healthy, alert and no distress  EYES: Eyes grossly normal to inspection, PERRL and conjunctivae and sclerae normal  HENT: normal cephalic/atraumatic, both ears: clear effusion, nose and mouth without ulcers or lesions, nasal mucosa edematous , rhinorrhea purulent, oropharynx clear, oral mucous membranes moist and sinuses: maxillary, frontal tenderness on both sides  NECK: bilateral anterior cervical adenopathy and no asymmetry, masses, or scars  RESP: decreased breath sounds throughout  CV: regular rate and rhythm, normal S1 S2, no S3 or S4, no murmur, click or rub, no peripheral edema and peripheral pulses strong  MS: no gross musculoskeletal defects noted, no edema  SKIN: no suspicious lesions or rashes  NEURO: Normal " "strength and tone, mentation intact and speech normal    Diagnostic Test Results:  Labs reviewed in Epic        Assessment & Plan       ICD-10-CM    1. Acute sinusitis with symptoms > 10 days J01.90 amoxicillin-clavulanate (AUGMENTIN) 875-125 MG tablet        Tobacco Cessation:   reports that he has been smoking cigarettes. He has a 50.00 pack-year smoking history. He has never used smokeless tobacco.        BMI:   Estimated body mass index is 33.57 kg/m  as calculated from the following:    Height as of this encounter: 1.727 m (5' 8\").    Weight as of this encounter: 100.2 kg (220 lb 12.8 oz).       FUTURE APPOINTMENTS:       - Follow up in 1 week for persistent symptoms, sooner for new or worsening symptoms.     Patient Instructions     Fluticasone or Flonase nasal spray- over the counter- by the allergy medications. 1 spray each nostril twice a day.      Patient Education     Sinusitis (Antibiotic Treatment)    The sinuses are air-filled spaces within the bones of the face. They connect to the inside of the nose. Sinusitis is an inflammation of the tissue that lines the sinuses. Sinusitis can occur during a cold. It can also happen due to allergies to pollens and other particles in the air. Sinusitis can cause symptoms of sinus congestion and a feeling of fullness. A sinus infection causes fever, headache, and facial pain. There is often green or yellow fluid draining from the nose or into the back of the throat (post-nasal drip). You have been given antibiotics to treat this condition.  Home care    Take the full course of antibiotics as instructed. Do not stop taking them, even when you feel better.    Drink plenty of water, hot tea, and other liquids. This may help thin nasal mucus. It also may help your sinuses drain fluids.    Heat may help soothe painful areas of your face. Use a towel soaked in hot water. Or,  the shower and direct the warm spray onto your face. Using a vaporizer along with a " menthol rub at night may also help soothe symptoms.     An expectorant with guaifenesin may help thin nasal mucus and help your sinuses drain fluids.    You can use an over-the-counter decongestant, unless a similar medicine was prescribed to you. Nasal sprays work the fastest. Use one that contains phenylephrine or oxymetazoline. First blow your nose gently. Then use the spray. Do not use these medicines more often than directed on the label. If you do, your symptoms may get worse. You may also take pills that contain pseudoephedrine. Don t use products that combine multiple medicines. This is because side effects may be increased. Read labels. You can also ask the pharmacist for help. (People with high blood pressure should not use decongestants. They can raise blood pressure.)    Over-the-counter antihistamines may help if allergies contributed to your sinusitis.      Do not use nasal rinses or irrigation during an acute sinus infection, unless your healthcare provider tells you to. Rinsing may spread the infection to other areas in your sinuses.    Use acetaminophen or ibuprofen to control pain, unless another pain medicine was prescribed to you. If you have chronic liver or kidney disease or ever had a stomach ulcer, talk with your healthcare provider before using these medicines. (Aspirin should never be taken by anyone under age 18 who is ill with a fever. It may cause severe liver damage.)    Don't smoke. This can make symptoms worse.  Follow-up care  Follow up with your healthcare provider or our staff if you are not better in 1 week.  When to seek medical advice  Call your healthcare provider if any of these occur:    Facial pain or headache that gets worse    Stiff neck    Unusual drowsiness or confusion    Swelling of your forehead or eyelids    Vision problems, such as blurred or double vision    Fever of 100.4 F (38 C) or higher, or as directed by your healthcare provider    Seizure    Breathing  problems    Symptoms don't go away in 10 days  Prevention  Here are steps you can take to help prevent an infection:    Keep good hand washing habits.    Don t have close contact with people who have sore throats, colds, or other upper respiratory infections.    Don t smoke, and stay away from secondhand smoke.    Stay up to date with of your vaccines.  Date Last Reviewed: 11/1/2017 2000-2019 [employer]. [address] [place] [address], [place] [id number]. All rights reserved. This information is not intended as a substitute for professional medical care. Always follow your healthcare professional's instructions.           Patient Education     Causes of Sinusitis  Mucus helps keep your sinuses clean. But mucus may build up in the sinuses because of colds, allergies, or blockages. These things get in the way of the natural drainage of mucus. This may lead to sinusitis. Sinusitis means sinus inflammation and infection.    Acute sinusitis comes on suddenly. It often happens right after an upper respiratory infection, such as a cold. Viruses cause most acute sinus infections.    Chronic sinusitis is ongoing swelling of the sinus lining. Doctors don't know what causes chronic sinusitis.      Colds and other infections  A cold or flu may cause your sinus and nasal linings to swell. Sinus openings can become blocked. This causes mucus to back up. This backed-up mucus becomes an ideal place for bacteria to grow. Thick, yellow, or discolored mucus is one sign of infection.  Allergic reactions  You may be sensitive to certain substances. This causes the release of histamine in the body. Histamine makes your sinus and nasal linings swell. Long-term swelling clogs your sinuses. It prevents the tiny hairs (cilia) in the nasal lining from sweeping away mucus. Allergy symptoms can continue over time. But they re less severe than with colds.  Blockages    A polyp is a sac of swollen tissue. It can be the result of an  allergy or infection. It may block the opening where most of your sinuses drain (middle meatus). It may even grow large enough to block your nose.    A deviated septum is when the thin wall inside your nose is pushed to one side. It is often the result of injury. This can block your middle meatus.  People with chronic nasal problems or allergies are more likely to get acute sinusitis. Sinusitis is also more common if you have a weakened immune system, such as with HIV. You are also more likely to get sinusitis if you have cystic fibrosis or another condition that causes your body to make extra mucus.  Date Last Reviewed: 10/1/2016    3499-5935 [employer]. [address] [place] [address], [place] [address]. All rights reserved. This information is not intended as a substitute for professional medical care. Always follow your healthcare professional's instructions.               No follow-ups on file.    BLANCO [facility]

## 2020-01-13 NOTE — PATIENT INSTRUCTIONS
Fluticasone or Flonase nasal spray- over the counter- by the allergy medications. 1 spray each nostril twice a day.      Patient Education     Sinusitis (Antibiotic Treatment)    The sinuses are air-filled spaces within the bones of the face. They connect to the inside of the nose. Sinusitis is an inflammation of the tissue that lines the sinuses. Sinusitis can occur during a cold. It can also happen due to allergies to pollens and other particles in the air. Sinusitis can cause symptoms of sinus congestion and a feeling of fullness. A sinus infection causes fever, headache, and facial pain. There is often green or yellow fluid draining from the nose or into the back of the throat (post-nasal drip). You have been given antibiotics to treat this condition.  Home care    Take the full course of antibiotics as instructed. Do not stop taking them, even when you feel better.    Drink plenty of water, hot tea, and other liquids. This may help thin nasal mucus. It also may help your sinuses drain fluids.    Heat may help soothe painful areas of your face. Use a towel soaked in hot water. Or,  the shower and direct the warm spray onto your face. Using a vaporizer along with a menthol rub at night may also help soothe symptoms.     An expectorant with guaifenesin may help thin nasal mucus and help your sinuses drain fluids.    You can use an over-the-counter decongestant, unless a similar medicine was prescribed to you. Nasal sprays work the fastest. Use one that contains phenylephrine or oxymetazoline. First blow your nose gently. Then use the spray. Do not use these medicines more often than directed on the label. If you do, your symptoms may get worse. You may also take pills that contain pseudoephedrine. Don t use products that combine multiple medicines. This is because side effects may be increased. Read labels. You can also ask the pharmacist for help. (People with high blood pressure should not use  decongestants. They can raise blood pressure.)    Over-the-counter antihistamines may help if allergies contributed to your sinusitis.      Do not use nasal rinses or irrigation during an acute sinus infection, unless your healthcare provider tells you to. Rinsing may spread the infection to other areas in your sinuses.    Use acetaminophen or ibuprofen to control pain, unless another pain medicine was prescribed to you. If you have chronic liver or kidney disease or ever had a stomach ulcer, talk with your healthcare provider before using these medicines. (Aspirin should never be taken by anyone under age 18 who is ill with a fever. It may cause severe liver damage.)    Don't smoke. This can make symptoms worse.  Follow-up care  Follow up with your healthcare provider or our staff if you are not better in 1 week.  When to seek medical advice  Call your healthcare provider if any of these occur:    Facial pain or headache that gets worse    Stiff neck    Unusual drowsiness or confusion    Swelling of your forehead or eyelids    Vision problems, such as blurred or double vision    Fever of 100.4 F (38 C) or higher, or as directed by your healthcare provider    Seizure    Breathing problems    Symptoms don't go away in 10 days  Prevention  Here are steps you can take to help prevent an infection:    Keep good hand washing habits.    Don t have close contact with people who have sore throats, colds, or other upper respiratory infections.    Don t smoke, and stay away from secondhand smoke.    Stay up to date with of your vaccines.  Date Last Reviewed: 11/1/2017 2000-2019 [employer]. [address] [place] [address], [place] [address]. All rights reserved. This information is not intended as a substitute for professional medical care. Always follow your healthcare professional's instructions.           Patient Education     Causes of Sinusitis  Mucus helps keep your sinuses clean. But mucus may build up in the  sinuses because of colds, allergies, or blockages. These things get in the way of the natural drainage of mucus. This may lead to sinusitis. Sinusitis means sinus inflammation and infection.    Acute sinusitis comes on suddenly. It often happens right after an upper respiratory infection, such as a cold. Viruses cause most acute sinus infections.    Chronic sinusitis is ongoing swelling of the sinus lining. Doctors don't know what causes chronic sinusitis.      Colds and other infections  A cold or flu may cause your sinus and nasal linings to swell. Sinus openings can become blocked. This causes mucus to back up. This backed-up mucus becomes an ideal place for bacteria to grow. Thick, yellow, or discolored mucus is one sign of infection.  Allergic reactions  You may be sensitive to certain substances. This causes the release of histamine in the body. Histamine makes your sinus and nasal linings swell. Long-term swelling clogs your sinuses. It prevents the tiny hairs (cilia) in the nasal lining from sweeping away mucus. Allergy symptoms can continue over time. But they re less severe than with colds.  Blockages    A polyp is a sac of swollen tissue. It can be the result of an allergy or infection. It may block the opening where most of your sinuses drain (middle meatus). It may even grow large enough to block your nose.    A deviated septum is when the thin wall inside your nose is pushed to one side. It is often the result of injury. This can block your middle meatus.  People with chronic nasal problems or allergies are more likely to get acute sinusitis. Sinusitis is also more common if you have a weakened immune system, such as with HIV. You are also more likely to get sinusitis if you have cystic fibrosis or another condition that causes your body to make extra mucus.  Date Last Reviewed: 10/1/2016    2417-2818 [employer]. [address] [place] [address], [place] [address]. All rights reserved. This  information is not intended as a substitute for professional medical care. Always follow your healthcare professional's instructions.

## 2020-02-21 ENCOUNTER — OFFICE VISIT (OUTPATIENT)
Dept: FAMILY MEDICINE | Facility: CLINIC | Age: 75
End: 2020-02-21
Payer: MEDICARE

## 2020-02-21 VITALS
OXYGEN SATURATION: 97 % | SYSTOLIC BLOOD PRESSURE: 114 MMHG | WEIGHT: 214.8 LBS | DIASTOLIC BLOOD PRESSURE: 62 MMHG | HEART RATE: 62 BPM | BODY MASS INDEX: 32.66 KG/M2

## 2020-02-21 DIAGNOSIS — L30.9 ECZEMA, UNSPECIFIED TYPE: Primary | ICD-10-CM

## 2020-02-21 PROCEDURE — 99213 OFFICE O/P EST LOW 20 MIN: CPT | Performed by: NURSE PRACTITIONER

## 2020-02-21 NOTE — PROGRESS NOTES
Subjective     Remington Gutierrez is a 74 year old male who presents to clinic today for the following health issues:    HPI   Chief Complaint   Patient presents with     Derm Problem     has had x 10 years        Rash      Duration: years    Description  Location: left hand  Itching: moderate    Intensity:  moderate    Accompanying signs and symptoms: cracking and pain    History (similar episodes/previous evaluation): cream from Dermatology helps some    Precipitating or alleviating factors:  New exposures:  None  Recent travel: no    Usually worse in the winter    Therapies tried and outcome: topical steroid - some relief      Patient Active Problem List   Diagnosis     Sensorineural Hearing Loss, Bilateral     Dysthymic disorder     Obstructive sleep apnea     Osteoarthritis     Obesity     Memory loss     Backache     HYPERLIPIDEMIA LDL GOAL <100     Advanced directives, counseling/discussion     Type 2 diabetes mellitus without complication, without long-term current use of insulin (H)     COPD exacerbation (H)     Past Surgical History:   Procedure Laterality Date     COLONOSCOPY  7/2/03     ORTHOPEDIC SURGERY       SURGICAL HISTORY OF -   2004    Arthroscopy (L) Knee      SURGICAL HISTORY OF -   9/08    left TKT     SURGICAL HISTORY OF -   10/08    Right THR       Social History     Tobacco Use     Smoking status: Former Smoker     Packs/day: 2.00     Years: 25.00     Pack years: 50.00     Types: Cigarettes     Last attempt to quit: 1/31/2005     Years since quitting: 15.0     Smokeless tobacco: Never Used   Substance Use Topics     Alcohol use: Yes     Comment: very little     Family History   Problem Relation Age of Onset     Respiratory Mother         copd     Heart Disease Mother      Cardiovascular Mother 75        MI     Coronary Artery Disease Mother      Cancer Father         kidney     Arthritis Brother      Cardiovascular Brother      Chronic Obstructive Pulmonary Disease Brother      Rheumatoid  "Arthritis Brother      Hyperlipidemia Brother      Allergies Sister      Depression Sister      Coronary Artery Disease Maternal Grandmother      Cerebrovascular Disease Brother      Diabetes Brother      Hypertension Brother      Hyperlipidemia Brother      Prostate Cancer Brother      Cerebrovascular Disease Brother      Hyperlipidemia Brother      Depression Brother      Family History Negative No family hx of            Reviewed and updated as needed this visit by Provider         Review of Systems   ROS COMP: Constitutional, HEENT, cardiovascular, pulmonary, gi and gu systems are negative, except as otherwise noted.      Objective    /62 (BP Location: Right arm, Cuff Size: Adult Large)   Pulse 62   Wt 97.4 kg (214 lb 12.8 oz)   SpO2 97%   BMI 32.66 kg/m    Body mass index is 32.66 kg/m .  Physical Exam   GENERAL: healthy, alert and no distress  SKIN: left hand- palmar- erythematous patch with fissures and scaling  NEURO: Normal strength and tone, mentation intact and speech normal    Diagnostic Test Results:  Labs reviewed in Epic  none         Assessment & Plan       ICD-10-CM    1. Eczema, unspecified type L30.9 betamethasone valerate 0.1 % EX external ointment        BMI:   Estimated body mass index is 32.66 kg/m  as calculated from the following:    Height as of 1/13/20: 1.727 m (5' 8\").    Weight as of this encounter: 97.4 kg (214 lb 12.8 oz).       FUTURE APPOINTMENTS:       - Follow-up visit in 2 weeks for persistent symptoms, sooner PRN new or worsening symptoms.     Patient Instructions         Our Clinic hours are:  Mondays    7:20 am - 7 pm  Tues -  Fri  7:20 am - 5 pm    Clinic Phone: [phone]    The clinic lab opens at 7:30 am Mon - Fri and appointments are required.    [facility]. [phone]  Monday  8 am - 7pm  Tues - Fri 8 am - 5:30 pm         Patient Education     Atopic Dermatitis (Adult)  Atopic dermatitis is a dry, itchy, red rash. It s also called " eczema. The rash is chronic, or ongoing. It can come and go over time. The disease is often passed down in families. It causes a problem with the skin barrier that makes the skin more sensitive to the environment and other factors. The increased skin sensitivity causes an itch, which causes scratching. Scratching can worsen the itching or also break the skin. This can put the skin at risk of infection.  The condition is most common in people with asthma, hay fever, hives, or dry or sensitive skin. The rash may be caused by extreme heat or heavy sweating. Skin irritants can cause the rash to flare up. These can include wool or silk clothing, grease, oils, some medicines, and harsh soaps and detergents. Emotional stress can also be a trigger.  Treatment is done to relieve the itching and inflammation of the skin.  Home care  Follow these tips to care for your condition:    Keep the areas of rash clean by bathing at least every other day. Use lukewarm water to bathe. Don t use hot water, which can dry out the skin.    Don t use soaps with strong detergents. Use mild soaps made for sensitive skin.    Apply a cream or ointment to damp skin right after bathing.    Avoid things that irritate your skin. Wear absorbent, soft fabrics next to the skin rather than rough or scratchy materials.    Use mild laundry soap free of scents and perfumes. Make sure to rinse all the soap out of your clothes.    Treat any skin infection as directed.    Use oral diphenhydramine to help reduce itching. This is an antihistamine you can buy at drug and grocery stores. It can make you sleepy, so use lower doses during the daytime. Or you can use loratadine. This is an antihistamine that will not make you sleepy. Do not use diphenhydramine if you have glaucoma or have trouble urinating due to an enlarged prostate.  Follow-up care  See your healthcare provider, or as advised. If your symptoms don t get better or if they get worse in the next 7  days, make an appointment with your healthcare provider.  When to seek medical advice  Call your healthcare provider right away  if any of these occur:    Increasing area of redness or pain in the skin    Yellow crusts or wet drainage from the rash    Fever of 100.4 F (38 C) or higher, or as directed by your healthcare provider  Date Last Reviewed: 9/1/2016 2000-2019 [employer]. [address] [place] [address]. All rights reserved. This information is not intended as a substitute for professional medical care. Always follow your healthcare professional's instructions.           Patient Education     Understanding Epidermoid Cyst    An epidermoid cyst is a small abnormal growth in the top layers of the skin. It is filled with keratin, the same proteins that make up your hair and nails. These cysts grow slowly. They can grow anywhere on the body. But they are most often found on the face, behind the ears, and on the chest or upper back.  How to say it  [id number] sist   What causes an epidermoid cyst?  An epidermoid cyst may occur because of an injury to the skin or from acne.  Symptoms of an epidermoid cyst  An epidermoid cyst is a subcutaneous bump. This means it is just below the skin. It may be yellow or skin-colored. It often has a small black daniel in the middle of it, like a blackhead.  An epidermoid cyst rarely causes pain, unless it ruptures or becomes infected. It may ooze keratin, a white, cheesy, smelly material. If the cyst becomes inflamed or infected, it may be:    Bad smelling    Red    Swollen    Tender  Treatment for an epidermoid cyst  Many epidermoid cysts don t cause any problems. They don t necessarily need to be treated. They may go away on their own. But you may want to treat it if you don t like how it looks or it becomes inflamed.  Treatment options include:    Steroid injection. A steroid may be injected into the cyst. This may help ease inflammation. It may  also prevent infection.    Surgical removal. The cyst can be cut out. It may also need to be drained first. There is a slight chance the cyst may come back.    Antibiotics. In some cases, you may need to take an oral antibiotic to prevent infection and regrowth.  When to call your healthcare provider  Call your healthcare provider right away if you have any of these:    Fever of 100.4 F (38 C) or higher, or as directed    Redness, swelling, or fluid leaking from your incision that gets worse    Pain that gets worse    Symptoms that don t get better, or get worse    New symptoms   Date Last Reviewed: 5/1/2016 2000-2019 [employer]. [address] [place] [address], [place] [address]. All rights reserved. This information is not intended as a substitute for professional medical care. Always follow your healthcare professional's instructions.               No follow-ups on file.    BLANCO Simpson [facility]

## 2020-02-21 NOTE — PATIENT INSTRUCTIONS
Our Clinic hours are:  Mondays    7:20 am - 7 pm  Tues - Fri  7:20 am - 5 pm    Clinic Phone: [phone]    The clinic lab opens at 7:30 am Mon - Fri and appointments are required.    [facility]. [phone]  Monday  8 am - 7pm  Tues - Fri 8 am - 5:30 pm         Patient Education     Atopic Dermatitis (Adult)  Atopic dermatitis is a dry, itchy, red rash. It s also called eczema. The rash is chronic, or ongoing. It can come and go over time. The disease is often passed down in families. It causes a problem with the skin barrier that makes the skin more sensitive to the environment and other factors. The increased skin sensitivity causes an itch, which causes scratching. Scratching can worsen the itching or also break the skin. This can put the skin at risk of infection.  The condition is most common in people with asthma, hay fever, hives, or dry or sensitive skin. The rash may be caused by extreme heat or heavy sweating. Skin irritants can cause the rash to flare up. These can include wool or silk clothing, grease, oils, some medicines, and harsh soaps and detergents. Emotional stress can also be a trigger.  Treatment is done to relieve the itching and inflammation of the skin.  Home care  Follow these tips to care for your condition:    Keep the areas of rash clean by bathing at least every other day. Use lukewarm water to bathe. Don t use hot water, which can dry out the skin.    Don t use soaps with strong detergents. Use mild soaps made for sensitive skin.    Apply a cream or ointment to damp skin right after bathing.    Avoid things that irritate your skin. Wear absorbent, soft fabrics next to the skin rather than rough or scratchy materials.    Use mild laundry soap free of scents and perfumes. Make sure to rinse all the soap out of your clothes.    Treat any skin infection as directed.    Use oral diphenhydramine to help reduce itching. This is an antihistamine you can buy at  drug and grocery stores. It can make you sleepy, so use lower doses during the daytime. Or you can use loratadine. This is an antihistamine that will not make you sleepy. Do not use diphenhydramine if you have glaucoma or have trouble urinating due to an enlarged prostate.  Follow-up care  See your healthcare provider, or as advised. If your symptoms don t get better or if they get worse in the next 7 days, make an appointment with your healthcare provider.  When to seek medical advice  Call your healthcare provider right away  if any of these occur:    Increasing area of redness or pain in the skin    Yellow crusts or wet drainage from the rash    Fever of 100.4 F (38 C) or higher, or as directed by your healthcare provider  Date Last Reviewed: 9/1/2016 2000-2019 [employer]. [address] [place] [address]. All rights reserved. This information is not intended as a substitute for professional medical care. Always follow your healthcare professional's instructions.           Patient Education     Understanding Epidermoid Cyst    An epidermoid cyst is a small abnormal growth in the top layers of the skin. It is filled with keratin, the same proteins that make up your hair and nails. These cysts grow slowly. They can grow anywhere on the body. But they are most often found on the face, behind the ears, and on the chest or upper back.  How to say it  [id number] sist   What causes an epidermoid cyst?  An epidermoid cyst may occur because of an injury to the skin or from acne.  Symptoms of an epidermoid cyst  An epidermoid cyst is a subcutaneous bump. This means it is just below the skin. It may be yellow or skin-colored. It often has a small black daniel in the middle of it, like a blackhead.  An epidermoid cyst rarely causes pain, unless it ruptures or becomes infected. It may ooze keratin, a white, cheesy, smelly material. If the cyst becomes inflamed or infected, it may be:    Bad  smelling    Red    Swollen    Tender  Treatment for an epidermoid cyst  Many epidermoid cysts don t cause any problems. They don t necessarily need to be treated. They may go away on their own. But you may want to treat it if you don t like how it looks or it becomes inflamed.  Treatment options include:    Steroid injection. A steroid may be injected into the cyst. This may help ease inflammation. It may also prevent infection.    Surgical removal. The cyst can be cut out. It may also need to be drained first. There is a slight chance the cyst may come back.    Antibiotics. In some cases, you may need to take an oral antibiotic to prevent infection and regrowth.  When to call your healthcare provider  Call your healthcare provider right away if you have any of these:    Fever of 100.4 F (38 C) or higher, or as directed    Redness, swelling, or fluid leaking from your incision that gets worse    Pain that gets worse    Symptoms that don t get better, or get worse    New symptoms   Date Last Reviewed: 5/1/2016 2000-2019 [employer]. [address] [place] [address], [place] [address]. All rights reserved. This information is not intended as a substitute for professional medical care. Always follow your healthcare professional's instructions.

## 2020-04-29 ENCOUNTER — TRANSFERRED RECORDS (OUTPATIENT)
Dept: HEALTH INFORMATION MANAGEMENT | Facility: CLINIC | Age: 75
End: 2020-04-29

## 2021-03-02 ENCOUNTER — APPOINTMENT (OUTPATIENT)
Dept: CT IMAGING | Facility: CLINIC | Age: 76
End: 2021-03-02
Attending: EMERGENCY MEDICINE
Payer: MEDICARE

## 2021-03-02 ENCOUNTER — HOSPITAL ENCOUNTER (EMERGENCY)
Facility: CLINIC | Age: 76
Discharge: HOME | End: 2021-03-03
Attending: EMERGENCY MEDICINE | Admitting: EMERGENCY MEDICINE
Payer: MEDICARE

## 2021-03-02 DIAGNOSIS — N28.9 RENAL INSUFFICIENCY: ICD-10-CM

## 2021-03-02 DIAGNOSIS — R41.0 EPISODE OF CONFUSION: ICD-10-CM

## 2021-03-02 DIAGNOSIS — E86.0 DEHYDRATION: ICD-10-CM

## 2021-03-02 PROBLEM — I50.9 HEART FAILURE, UNSPECIFIED HF CHRONICITY, UNSPECIFIED HEART FAILURE TYPE (H): Status: ACTIVE | Noted: 2021-03-02

## 2021-03-02 PROBLEM — I50.22 CHRONIC SYSTOLIC CONGESTIVE HEART FAILURE (H): Status: ACTIVE | Noted: 2021-03-02

## 2021-03-02 LAB
ALBUMIN SERPL-MCNC: 4.2 G/DL (ref 3.4–5)
ALP SERPL-CCNC: 136 U/L (ref 40–150)
ALT SERPL W P-5'-P-CCNC: 37 U/L (ref 0–70)
ANION GAP SERPL CALCULATED.3IONS-SCNC: 8 MMOL/L (ref 3–14)
AST SERPL W P-5'-P-CCNC: 27 U/L (ref 0–45)
BASOPHILS # BLD AUTO: 0 10E9/L (ref 0–0.2)
BASOPHILS NFR BLD AUTO: 0.6 %
BILIRUB SERPL-MCNC: 0.7 MG/DL (ref 0.2–1.3)
BUN SERPL-MCNC: 42 MG/DL (ref 7–30)
CALCIUM SERPL-MCNC: 9 MG/DL (ref 8.5–10.1)
CHLORIDE SERPL-SCNC: 111 MMOL/L (ref 94–109)
CO2 SERPL-SCNC: 21 MMOL/L (ref 20–32)
CREAT SERPL-MCNC: 1.37 MG/DL (ref 0.66–1.25)
DIFFERENTIAL METHOD BLD: ABNORMAL
EOSINOPHIL # BLD AUTO: 0.2 10E9/L (ref 0–0.7)
EOSINOPHIL NFR BLD AUTO: 3.9 %
ERYTHROCYTE [DISTWIDTH] IN BLOOD BY AUTOMATED COUNT: 13 % (ref 10–15)
GFR SERPL CREATININE-BSD FRML MDRD: 49 ML/MIN/{1.73_M2}
GLUCOSE SERPL-MCNC: 169 MG/DL (ref 70–99)
HCT VFR BLD AUTO: 44.7 % (ref 40–53)
HGB BLD-MCNC: 14.5 G/DL (ref 13.3–17.7)
IMM GRANULOCYTES # BLD: 0 10E9/L (ref 0–0.4)
IMM GRANULOCYTES NFR BLD: 0.2 %
LYMPHOCYTES # BLD AUTO: 1.4 10E9/L (ref 0.8–5.3)
LYMPHOCYTES NFR BLD AUTO: 26.2 %
MCH RBC QN AUTO: 33 PG (ref 26.5–33)
MCHC RBC AUTO-ENTMCNC: 32.4 G/DL (ref 31.5–36.5)
MCV RBC AUTO: 102 FL (ref 78–100)
MONOCYTES # BLD AUTO: 0.6 10E9/L (ref 0–1.3)
MONOCYTES NFR BLD AUTO: 11.3 %
NEUTROPHILS # BLD AUTO: 3 10E9/L (ref 1.6–8.3)
NEUTROPHILS NFR BLD AUTO: 57.8 %
NRBC # BLD AUTO: 0 10*3/UL
NRBC BLD AUTO-RTO: 0 /100
PLATELET # BLD AUTO: 133 10E9/L (ref 150–450)
POTASSIUM SERPL-SCNC: 4.6 MMOL/L (ref 3.4–5.3)
PROT SERPL-MCNC: 7.4 G/DL (ref 6.8–8.8)
RBC # BLD AUTO: 4.39 10E12/L (ref 4.4–5.9)
SODIUM SERPL-SCNC: 140 MMOL/L (ref 133–144)
WBC # BLD AUTO: 5.2 10E9/L (ref 4–11)

## 2021-03-02 PROCEDURE — 80053 COMPREHEN METABOLIC PANEL: CPT | Performed by: EMERGENCY MEDICINE

## 2021-03-02 PROCEDURE — 93010 ELECTROCARDIOGRAM REPORT: CPT | Performed by: EMERGENCY MEDICINE

## 2021-03-02 PROCEDURE — 85025 COMPLETE CBC W/AUTO DIFF WBC: CPT | Performed by: EMERGENCY MEDICINE

## 2021-03-02 PROCEDURE — 93005 ELECTROCARDIOGRAM TRACING: CPT | Performed by: EMERGENCY MEDICINE

## 2021-03-02 PROCEDURE — 99285 EMERGENCY DEPT VISIT HI MDM: CPT | Mod: 25 | Performed by: EMERGENCY MEDICINE

## 2021-03-02 PROCEDURE — 70450 CT HEAD/BRAIN W/O DYE: CPT | Mod: MG

## 2021-03-02 ASSESSMENT — ENCOUNTER SYMPTOMS
DYSPHORIC MOOD: 0
APPETITE CHANGE: 0
LIGHT-HEADEDNESS: 0
ACTIVITY CHANGE: 0
FATIGUE: 0
CHILLS: 0
HEADACHES: 0
FEVER: 0
WEAKNESS: 0
NUMBNESS: 0
BACK PAIN: 0
CHEST TIGHTNESS: 0
NECK STIFFNESS: 0
PALPITATIONS: 0
NECK PAIN: 0
NAUSEA: 0
CONFUSION: 1
DIARRHEA: 0
NERVOUS/ANXIOUS: 0
ABDOMINAL PAIN: 0
VOMITING: 0
SHORTNESS OF BREATH: 1
COUGH: 0

## 2021-03-03 VITALS
WEIGHT: 220 LBS | BODY MASS INDEX: 33.45 KG/M2 | DIASTOLIC BLOOD PRESSURE: 79 MMHG | OXYGEN SATURATION: 95 % | SYSTOLIC BLOOD PRESSURE: 116 MMHG | HEART RATE: 57 BPM | TEMPERATURE: 97.8 F | RESPIRATION RATE: 16 BRPM

## 2021-03-03 LAB
ALBUMIN UR-MCNC: NEGATIVE MG/DL
APPEARANCE UR: CLEAR
BILIRUB UR QL STRIP: NEGATIVE
COLOR UR AUTO: YELLOW
GLUCOSE UR STRIP-MCNC: >499 MG/DL
HGB UR QL STRIP: NEGATIVE
KETONES UR STRIP-MCNC: NEGATIVE MG/DL
LEUKOCYTE ESTERASE UR QL STRIP: NEGATIVE
NITRATE UR QL: NEGATIVE
PH UR STRIP: 5 PH (ref 5–7)
SOURCE: ABNORMAL
SP GR UR STRIP: 1.03 (ref 1–1.03)
UROBILINOGEN UR STRIP-MCNC: 0 MG/DL (ref 0–2)

## 2021-03-03 PROCEDURE — 81003 URINALYSIS AUTO W/O SCOPE: CPT | Performed by: EMERGENCY MEDICINE

## 2021-03-03 NOTE — ED NOTES
Pt present sot the ED via EMS. Pt's son here and reports that the patient is having difficulty tracking and seems more confused than normal. He has hx of COPD and has had SOA chrnoically for years. Fell at home yesterday but denies head injury. Answers questions appropriately, [place]. Son at bedside.

## 2021-03-03 NOTE — ED PROVIDER NOTES
History     Chief Complaint   Patient presents with     Altered Mental Status     [facility]  Patient  lives Alone  recent medication changes. also complains of recent fall and SOA     HPI  Remington Gutierrez is a 75 year old male with history of COPD, CHF, hypertension, diabetes presenting for evaluation of some confusion.  Reportedly was well yesterday but noted by girlfriend to be confused when she talked on the phone with him today.  Son came over and found him confused as well.  Son reports that he seemed less responsive and would answer his questions with non sequitur responses.  Son reports he was alert and had no specific complaints.  Did fall yesterday and had a minor injury to his knee.  Does not believe he hit his head or loss consciousness.  Did not complain of headache, numbness, tingling, or weakness.  Reports some mild exertional dyspnea but this is basically at baseline.  Denies cough, fevers, or chills.  [facility] and has been having some medication adjustments.  Son reports patient had a similar relatively brief episode of confusion about a year ago when adjusting medications.  Unclear the exact etiology of his confusion last time.  Patient has no specific complaints when asked in the ED.    Allergies:  Allergies   Allergen Reactions     Neomycin Rash       Problem List:    Patient Active Problem List    Diagnosis Date Noted     Heart failure, unspecified HF chronicity, unspecified heart failure type (H) 03/02/2021     Priority: Medium     Chronic systolic congestive heart failure (H) 03/02/2021     Priority: Medium     COPD exacerbation (H) 01/13/2020     Priority: Medium     Type 2 diabetes mellitus without complication, without long-term current use of insulin (H) 03/22/2017     Priority: Medium     Advanced directives, counseling/discussion 01/21/2013     Priority: Medium     Patient has completed an Advance/Health Care Directive (HCD), scanned into  [employer] tab, entry date of 10/24/2008.    Kaitlin Givens  January 21, 2013         HYPERLIPIDEMIA LDL GOAL <100 10/31/2010     Priority: Medium     Backache 03/12/2009     Priority: Medium     Problem list name updated by automated process. Provider to review       Obstructive sleep apnea      Priority: Medium     Dx 2004: AHI 24.8, PLMAI 9.8, PLMI swelling of the ankles arousal 73.8. CPAP 10cm. Sleep study 11/08- Sleep latency 8 minutes with Ambien.  REM achieved.   REM latency 278 minutes.  Sleep efficiency 87%. Sleep architecture:  Stage 1, 29.8% (5%), stage 2, 61% (45-55%), stage 3, 0% (15-20%), stage REM, 8.5% (20-25%).  AHI was 10.8, with mild desaturations down to 88%.  REM RDI 60.8, consistent with excessive  REM BENNY.  Supine RDI 57.5, consistent with excessive  SUPINE BENNY.  Periodic Limb Movement Index 101.5/hour.  PLMAI 15.5.   Problem list name updated by automated process. Provider to review       Sensorineural Hearing Loss, Bilateral 06/28/2006     Priority: Medium     Obesity 11/20/2008     Priority: Low     Osteoarthritis      Priority: Low     Osteoarthritis  Problem list name updated by automated process. Provider to review       Memory loss      Priority: Low     Aricept started 2006       Dysthymic disorder 09/12/2006     Priority: Low     Formerly followed by Dr. Champion, [facility]. Now Dr. Kaufman (Last name unknown) will be taking over.          Past Medical History:    Past Medical History:   Diagnosis Date     BACKACHE NOS 7/12/2007       Past Surgical History:    Past Surgical History:   Procedure Laterality Date     COLONOSCOPY  7/2/03     ORTHOPEDIC SURGERY       SURGICAL HISTORY OF -   2004    Arthroscopy (L) Knee      SURGICAL HISTORY OF -   9/08    left TKT     SURGICAL HISTORY OF -   10/08    Right THR       Family History:    Family History   Problem Relation Age of Onset     Respiratory Mother         copd     Heart Disease Mother       Cardiovascular Mother 75        MI     Coronary Artery Disease Mother      Cancer Father         kidney     Arthritis Brother      Cardiovascular Brother      Chronic Obstructive Pulmonary Disease Brother      Rheumatoid Arthritis Brother      Hyperlipidemia Brother      Allergies Sister      Depression Sister      Coronary Artery Disease Maternal Grandmother      Cerebrovascular Disease Brother      Diabetes Brother      Hypertension Brother      Hyperlipidemia Brother      Prostate Cancer Brother      Cerebrovascular Disease Brother      Hyperlipidemia Brother      Depression Brother      Family History Negative No family hx of        Social History:  Marital Status:   [5]  Social History     Tobacco Use     Smoking status: Former Smoker     Packs/day: 2.00     Years: 25.00     Pack years: 50.00     Types: Cigarettes     Quit date: 2005     Years since quittin.0     Smokeless tobacco: Never Used   Substance Use Topics     Alcohol use: Yes     Comment: very little     Drug use: No        Medications:    ALPRAZOLAM PO  betamethasone valerate 0.1 % EX external ointment  DONEPEZIL HCL PO  DOXYCYCLINE HYCLATE PO  LAMOTRIGINE PO  lisinopril (PRINIVIL/ZESTRIL) 10 MG tablet  meclizine (ANTIVERT) 12.5 MG tablet  MELOXICAM PO  metFORMIN (GLUCOPHAGE) 500 MG tablet  PAROXETINE HCL PO  SIMVASTATIN PO  zolpidem (AMBIEN CR) 12.5 MG CR tablet          Review of Systems   Constitutional: Negative for activity change, appetite change, chills, fatigue and fever.   HENT: Negative for congestion.    Eyes: Negative for visual disturbance.   Respiratory: Positive for shortness of breath (With exertion only). Negative for cough and chest tightness.    Cardiovascular: Negative for chest pain, palpitations and leg swelling.   Gastrointestinal: Negative for abdominal pain, diarrhea, nausea and vomiting.   Genitourinary: Negative for decreased urine volume.   Musculoskeletal: Negative for back pain, neck pain and neck  stiffness.   Skin: Negative for rash.   Neurological: Negative for weakness, light-headedness, numbness and headaches.   Psychiatric/Behavioral: Positive for confusion (Patient denies this). Negative for behavioral problems and dysphoric mood. The patient is not nervous/anxious.    All other systems reviewed and are negative.      Physical Exam   BP: (!) 138/95  Pulse: 54  Temp: 97.8  F (36.6  C)  Resp: 18  Weight: 99.8 kg (220 lb)  SpO2: 96 %      Physical Exam  Vitals signs and nursing note reviewed.   Constitutional:       Appearance: He is obese. He is not ill-appearing or diaphoretic.   HENT:      Head: Atraumatic.      Nose: Nose normal.      Mouth/Throat:      Mouth: Mucous membranes are moist.   Eyes:      Extraocular Movements: Extraocular movements intact.      Pupils: Pupils are equal, round, and reactive to light.   Neck:      Musculoskeletal: Normal range of motion.   Cardiovascular:      Rate and Rhythm: Regular rhythm. Bradycardia present.      Pulses: Normal pulses.      Heart sounds: Normal heart sounds. No murmur.   Pulmonary:      Effort: Pulmonary effort is normal.      Breath sounds: Normal breath sounds. No wheezing or rhonchi.   Abdominal:      Tenderness: There is no abdominal tenderness. There is no guarding.      Comments: Protuberant   Musculoskeletal: Normal range of motion.      Right lower leg: Edema (Slight) present.      Left lower leg: Edema (Slight) present.   Skin:     General: Skin is warm and dry.      Capillary Refill: Capillary refill takes less than 2 seconds.   Neurological:      Mental Status: He is alert.      Cranial Nerves: No cranial nerve deficit.      Sensory: No sensory deficit.      Motor: No weakness.      Coordination: Coordination normal.      Gait: Gait (Ambulated easily from the ambulance stretcher to the bed) normal.      Comments: Oriented to person, place, and date.  Initially said the date was 1921 but then corrected to 2021   Psychiatric:         Mood and  Affect: Mood normal.         ED Course        Procedures               EKG Interpretation:      Interpreted by Ace Heck MD  Time reviewed: 2250  Symptoms at time of EKG: None   Rhythm: 1 degree AV block  Rate: 51  Axis: Left Axis Deviation  Ectopy: none  Conduction: normal  ST Segments/ T Waves: No acute ischemic changes  Q Waves: V2, likely old anterior infarct  Comparison to prior: Similar to previous EKG 6/17/2018    Clinical Impression: Sinus rhythm with first-degree AV block and no acute ischemic abnormality, not significantly changed from previous              Results for orders placed or performed during the hospital encounter of 03/02/21 (from the past 24 hour(s))   CBC with platelets differential   Result Value Ref Range    WBC 5.2 4.0 - 11.0 10e9/L    RBC Count 4.39 (L) 4.4 - 5.9 10e12/L    Hemoglobin 14.5 13.3 - 17.7 g/dL    Hematocrit 44.7 40.0 - 53.0 %     (H) 78 - 100 fl    MCH 33.0 26.5 - 33.0 pg    MCHC 32.4 31.5 - 36.5 g/dL    RDW 13.0 10.0 - 15.0 %    Platelet Count 133 (L) 150 - 450 10e9/L    Diff Method Automated Method     % Neutrophils 57.8 %    % Lymphocytes 26.2 %    % Monocytes 11.3 %    % Eosinophils 3.9 %    % Basophils 0.6 %    % Immature Granulocytes 0.2 %    Nucleated RBCs 0 0 /100    Absolute Neutrophil 3.0 1.6 - 8.3 10e9/L    Absolute Lymphocytes 1.4 0.8 - 5.3 10e9/L    Absolute Monocytes 0.6 0.0 - 1.3 10e9/L    Absolute Eosinophils 0.2 0.0 - 0.7 10e9/L    Absolute Basophils 0.0 0.0 - 0.2 10e9/L    Abs Immature Granulocytes 0.0 0 - 0.4 10e9/L    Absolute Nucleated RBC 0.0    Comprehensive metabolic panel   Result Value Ref Range    Sodium 140 133 - 144 mmol/L    Potassium 4.6 3.4 - 5.3 mmol/L    Chloride 111 (H) 94 - 109 mmol/L    Carbon Dioxide 21 20 - 32 mmol/L    Anion Gap 8 3 - 14 mmol/L    Glucose 169 (H) 70 - 99 mg/dL    Urea Nitrogen 42 (H) 7 - 30 mg/dL    Creatinine 1.37 (H) 0.66 - 1.25 mg/dL    GFR Estimate 49 (L) >60 mL/min/[1.73_m2]    GFR Estimate If  Black 57 (L) >60 mL/min/[1.73_m2]    Calcium 9.0 8.5 - 10.1 mg/dL    Bilirubin Total 0.7 0.2 - 1.3 mg/dL    Albumin 4.2 3.4 - 5.0 g/dL    Protein Total 7.4 6.8 - 8.8 g/dL    Alkaline Phosphatase 136 40 - 150 U/L    ALT 37 0 - 70 U/L    AST 27 0 - 45 U/L   CT Head w/o Contrast    Narrative    EXAM: CT HEAD W/O CONTRAST  LOCATION: [facility]  DATE/TIME: 3/2/2021 11:19 PM    INDICATION: Facial head trauma  COMPARISON: 10/19/2020  TECHNIQUE: Routine CT Head without IV contrast. Multiplanar reformats. Dose reduction techniques were used.    FINDINGS:  INTRACRANIAL CONTENTS: No intracranial hemorrhage, extraaxial collection, or mass effect.  No CT evidence of acute infarct. Mild presumed chronic small vessel ischemic changes. Mild generalized volume loss. No hydrocephalus.     VISUALIZED ORBITS/SINUSES/MASTOIDS: No intraorbital abnormality. No paranasal sinus mucosal disease. No middle ear or mastoid effusion.    BONES/SOFT TISSUES: No acute abnormality.      Impression    IMPRESSION:  1.  No acute intracranial hemorrhage or calvarial fracture.  2.  Stable mild age-related changes.   UA reflex to Microscopic   Result Value Ref Range    Color Urine Yellow     Appearance Urine Clear     Glucose Urine >499 (A) NEG^Negative mg/dL    Bilirubin Urine Negative NEG^Negative    Ketones Urine Negative NEG^Negative mg/dL    Specific Gravity Urine 1.031 1.003 - 1.035    Blood Urine Negative NEG^Negative    pH Urine 5.0 5.0 - 7.0 pH    Protein Albumin Urine Negative NEG^Negative mg/dL    Urobilinogen mg/dL 0.0 0.0 - 2.0 mg/dL    Nitrite Urine Negative NEG^Negative    Leukocyte Esterase Urine Negative NEG^Negative    Source Midstream Urine        Medications - No data to display    11:56 PM Patient re-assessed: Patient has no complaints.  Resting comfortably.  Son reports patient has been dozing off.  He feels patient is essentially at baseline currently.  Reviewed labs showing some mild renal insufficiency likely  secondary to dehydration.  Patient does not currently have an IV and is tolerating oral intake so will encourage oral hydration.  Patient did urinate so a UA will be obtained to evaluate for UTI although this is clinically unlikely.  Son feels comfortable going home and patient would like to go home.  [facility] states [facility].  Son also notes that he believes that it is possible the patient missed his medications yesterday evening which he feels may be contributing to his confusion today.      12:21 AM: Advised of no evidence of infection urine but does show dehydration with a specific gravity of 1.031.  Glucose high as well given his diabetes history.  Patient and son comfortable going home with follow[facility] with the [facility] in the morning.      Assessments & Plan (with Medical Decision Making)  75-year-old male with history of COPD, CHF, hypertension, diabetes presenting for evaluation of some transient confusion.  Had some brief confusion tonight.  More alert in the ED with no significant confusion.  Son believes this may be medication related due to recent changes in medication or possible missed dose of his medication.  Patient does appear to be on donepezil and missing this could be a cause of his symptoms.  No other acute causes identified on screening labs or CT of the head.  Patient does not appear to have any significant infection and is hemodynamically normal.  Patient is hard of hearing but does not appear encephalopathic or significantly confused currently.  No evidence of UTI.  Recommended close follow-up with primary care tomorrow to discuss medication management and plan for return if symptoms worsen or new symptoms develop.     I have reviewed the nursing notes.    I have reviewed the findings, diagnosis, plan and need for follow up with the patient.       New Prescriptions    No medications on file       Final diagnoses:   Episode of confusion   Dehydration   Renal  insufficiency       3/2/2021   [facility] EMERGENCY DEPT     Heck, Ace Martino MD  03/03/21 0023

## 2021-04-13 ENCOUNTER — TRANSFERRED RECORDS (OUTPATIENT)
Dept: HEALTH INFORMATION MANAGEMENT | Facility: CLINIC | Age: 76
End: 2021-04-13

## 2021-05-04 ENCOUNTER — TRANSFERRED RECORDS (OUTPATIENT)
Dept: HEALTH INFORMATION MANAGEMENT | Facility: CLINIC | Age: 76
End: 2021-05-04

## 2021-05-05 ENCOUNTER — TRANSFERRED RECORDS (OUTPATIENT)
Dept: HEALTH INFORMATION MANAGEMENT | Facility: CLINIC | Age: 76
End: 2021-05-05

## 2021-07-23 ENCOUNTER — HOSPITAL ENCOUNTER (EMERGENCY)
Facility: CLINIC | Age: 76
Discharge: HOME | End: 2021-07-23
Attending: FAMILY MEDICINE | Admitting: FAMILY MEDICINE
Payer: MEDICARE

## 2021-07-23 ENCOUNTER — APPOINTMENT (OUTPATIENT)
Dept: GENERAL RADIOLOGY | Facility: CLINIC | Age: 76
End: 2021-07-23
Attending: FAMILY MEDICINE
Payer: MEDICARE

## 2021-07-23 VITALS
DIASTOLIC BLOOD PRESSURE: 111 MMHG | TEMPERATURE: 97 F | OXYGEN SATURATION: 97 % | WEIGHT: 220 LBS | SYSTOLIC BLOOD PRESSURE: 129 MMHG | RESPIRATION RATE: 20 BRPM | BODY MASS INDEX: 33.45 KG/M2 | HEART RATE: 59 BPM

## 2021-07-23 DIAGNOSIS — J44.1 ACUTE EXACERBATION OF CHRONIC OBSTRUCTIVE PULMONARY DISEASE (H): ICD-10-CM

## 2021-07-23 DIAGNOSIS — I50.9 CONGESTIVE HEART FAILURE, UNSPECIFIED HF CHRONICITY, UNSPECIFIED HEART FAILURE TYPE (H): ICD-10-CM

## 2021-07-23 LAB
ALBUMIN SERPL-MCNC: 4 G/DL (ref 3.4–5)
ALBUMIN UR-MCNC: NEGATIVE MG/DL
ALP SERPL-CCNC: 96 U/L (ref 40–150)
ALT SERPL W P-5'-P-CCNC: 43 U/L (ref 0–70)
ANION GAP SERPL CALCULATED.3IONS-SCNC: 5 MMOL/L (ref 3–14)
APPEARANCE UR: CLEAR
AST SERPL W P-5'-P-CCNC: 27 U/L (ref 0–45)
BASOPHILS # BLD AUTO: 0 10E3/UL (ref 0–0.2)
BASOPHILS NFR BLD AUTO: 1 %
BILIRUB SERPL-MCNC: 0.7 MG/DL (ref 0.2–1.3)
BILIRUB UR QL STRIP: NEGATIVE
BUN SERPL-MCNC: 25 MG/DL (ref 7–30)
CALCIUM SERPL-MCNC: 8.9 MG/DL (ref 8.5–10.1)
CHLORIDE BLD-SCNC: 108 MMOL/L (ref 94–109)
CO2 SERPL-SCNC: 23 MMOL/L (ref 20–32)
COLOR UR AUTO: YELLOW
CREAT SERPL-MCNC: 1.14 MG/DL (ref 0.66–1.25)
D DIMER PPP FEU-MCNC: 0.56 UG/ML FEU (ref 0–0.5)
EOSINOPHIL # BLD AUTO: 0.4 10E3/UL (ref 0–0.7)
EOSINOPHIL NFR BLD AUTO: 9 %
ERYTHROCYTE [DISTWIDTH] IN BLOOD BY AUTOMATED COUNT: 14.7 % (ref 10–15)
GFR SERPL CREATININE-BSD FRML MDRD: 62 ML/MIN/1.73M2
GLUCOSE BLD-MCNC: 154 MG/DL (ref 70–99)
GLUCOSE UR STRIP-MCNC: NEGATIVE MG/DL
HCT VFR BLD AUTO: 38.6 % (ref 40–53)
HGB BLD-MCNC: 12.5 G/DL (ref 13.3–17.7)
HGB UR QL STRIP: NEGATIVE
IMM GRANULOCYTES # BLD: 0 10E3/UL
IMM GRANULOCYTES NFR BLD: 0 %
KETONES UR STRIP-MCNC: NEGATIVE MG/DL
LEUKOCYTE ESTERASE UR QL STRIP: NEGATIVE
LYMPHOCYTES # BLD AUTO: 0.7 10E3/UL (ref 0.8–5.3)
LYMPHOCYTES NFR BLD AUTO: 15 %
MCH RBC QN AUTO: 33.1 PG (ref 26.5–33)
MCHC RBC AUTO-ENTMCNC: 32.4 G/DL (ref 31.5–36.5)
MCV RBC AUTO: 102 FL (ref 78–100)
MONOCYTES # BLD AUTO: 0.5 10E3/UL (ref 0–1.3)
MONOCYTES NFR BLD AUTO: 11 %
NEUTROPHILS # BLD AUTO: 3 10E3/UL (ref 1.6–8.3)
NEUTROPHILS NFR BLD AUTO: 64 %
NITRATE UR QL: NEGATIVE
NRBC # BLD AUTO: 0 10E3/UL
NRBC BLD AUTO-RTO: 0 /100
NT-PROBNP SERPL-MCNC: 3075 PG/ML (ref 0–1800)
PH UR STRIP: 5 [PH] (ref 5–7)
PLAT MORPH BLD: NORMAL
PLATELET # BLD AUTO: 118 10E3/UL (ref 150–450)
POTASSIUM BLD-SCNC: 5.3 MMOL/L (ref 3.4–5.3)
PROT SERPL-MCNC: 7.2 G/DL (ref 6.8–8.8)
RBC # BLD AUTO: 3.78 10E6/UL (ref 4.4–5.9)
RBC MORPH BLD: NORMAL
SARS-COV-2 RNA RESP QL NAA+PROBE: NEGATIVE
SODIUM SERPL-SCNC: 136 MMOL/L (ref 133–144)
SP GR UR STRIP: 1.01 (ref 1–1.03)
TROPONIN I SERPL-MCNC: <0.015 UG/L (ref 0–0.04)
UROBILINOGEN UR STRIP-MCNC: NORMAL MG/DL
WBC # BLD AUTO: 4.6 10E3/UL (ref 4–11)

## 2021-07-23 PROCEDURE — 71046 X-RAY EXAM CHEST 2 VIEWS: CPT

## 2021-07-23 PROCEDURE — 87635 SARS-COV-2 COVID-19 AMP PRB: CPT | Performed by: FAMILY MEDICINE

## 2021-07-23 PROCEDURE — 93010 ELECTROCARDIOGRAM REPORT: CPT | Performed by: FAMILY MEDICINE

## 2021-07-23 PROCEDURE — 85048 AUTOMATED LEUKOCYTE COUNT: CPT | Performed by: FAMILY MEDICINE

## 2021-07-23 PROCEDURE — 93005 ELECTROCARDIOGRAM TRACING: CPT | Performed by: FAMILY MEDICINE

## 2021-07-23 PROCEDURE — 81003 URINALYSIS AUTO W/O SCOPE: CPT | Performed by: FAMILY MEDICINE

## 2021-07-23 PROCEDURE — 99285 EMERGENCY DEPT VISIT HI MDM: CPT | Mod: 25 | Performed by: FAMILY MEDICINE

## 2021-07-23 PROCEDURE — 80053 COMPREHEN METABOLIC PANEL: CPT | Performed by: FAMILY MEDICINE

## 2021-07-23 PROCEDURE — C9803 HOPD COVID-19 SPEC COLLECT: HCPCS | Performed by: FAMILY MEDICINE

## 2021-07-23 PROCEDURE — 36415 COLL VENOUS BLD VENIPUNCTURE: CPT | Performed by: FAMILY MEDICINE

## 2021-07-23 PROCEDURE — 85379 FIBRIN DEGRADATION QUANT: CPT | Performed by: FAMILY MEDICINE

## 2021-07-23 PROCEDURE — 83880 ASSAY OF NATRIURETIC PEPTIDE: CPT | Performed by: FAMILY MEDICINE

## 2021-07-23 PROCEDURE — 250N000012 HC RX MED GY IP 250 OP 636 PS 637: Performed by: FAMILY MEDICINE

## 2021-07-23 PROCEDURE — 94640 AIRWAY INHALATION TREATMENT: CPT | Performed by: FAMILY MEDICINE

## 2021-07-23 PROCEDURE — 84484 ASSAY OF TROPONIN QUANT: CPT | Performed by: FAMILY MEDICINE

## 2021-07-23 PROCEDURE — 250N000013 HC RX MED GY IP 250 OP 250 PS 637: Performed by: FAMILY MEDICINE

## 2021-07-23 RX ORDER — ALBUTEROL SULFATE 90 UG/1
6 AEROSOL, METERED RESPIRATORY (INHALATION) ONCE
Status: COMPLETED | OUTPATIENT
Start: 2021-07-23 | End: 2021-07-23

## 2021-07-23 RX ORDER — PREDNISONE 20 MG/1
40 TABLET ORAL DAILY
Qty: 8 TABLET | Refills: 0 | Status: SHIPPED | OUTPATIENT
Start: 2021-07-23 | End: 2021-07-27

## 2021-07-23 RX ORDER — PREDNISONE 20 MG/1
40 TABLET ORAL ONCE
Status: COMPLETED | OUTPATIENT
Start: 2021-07-23 | End: 2021-07-23

## 2021-07-23 RX ORDER — FUROSEMIDE 20 MG
20 TABLET ORAL ONCE
Status: COMPLETED | OUTPATIENT
Start: 2021-07-23 | End: 2021-07-23

## 2021-07-23 RX ADMIN — PREDNISONE 40 MG: 20 TABLET ORAL at 09:51

## 2021-07-23 RX ADMIN — FUROSEMIDE 20 MG: 20 TABLET ORAL at 11:59

## 2021-07-23 RX ADMIN — ALBUTEROL SULFATE 6 PUFF: 90 AEROSOL, METERED RESPIRATORY (INHALATION) at 10:12

## 2021-07-23 NOTE — DISCHARGE INSTRUCTIONS
Take furosemide 20 mg once daily in the morning.  Start this tomorrow.  Take prednisone, 20 mg, 2 pills once daily in the morning for 4 more days.  Use your albuterol inhaler 2 puffs up to every 4 hours if needed for wheezing or shortness of breath.  Follow-up as planned on Monday for your preoperative evaluation.  Return to the emergency department if you are having increased shortness of breath or other new concerning symptoms.    Get a Covid vaccination to stop the virus from mutating to more dangerous forms and stop mutants that evade the vaccine and will restart the pandemic. This protects the whole community. Sore arms and fevers are common after the vaccine. More than 300,000,000 Americans have received Covid vaccines with no serious reactions. More than 600,000 Americans have  of covid and almost 4,000,000 have  worldwide (although this is a gross underestimation.The actual number is much higher.) More than 1.5 billion people worldwide have received Covid vaccines with no serious reactions, in contrast to the disease, which not only kills, but causes long term heart, lung, and brain problems in a high percentage of survivors. By being vaccinated you are protecting yourself and other unvaccinated people, some of whom do not have a choice to be vaccinated, including children.
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
## 2021-09-07 ENCOUNTER — TRANSFERRED RECORDS (OUTPATIENT)
Dept: HEALTH INFORMATION MANAGEMENT | Facility: CLINIC | Age: 76
End: 2021-09-07

## 2021-09-07 LAB — EJECTION FRACTION: 25 %

## 2021-10-08 ENCOUNTER — APPOINTMENT (OUTPATIENT)
Dept: NUCLEAR MEDICINE | Facility: CLINIC | Age: 76
DRG: 177 | End: 2021-10-08
Attending: FAMILY MEDICINE
Payer: COMMERCIAL

## 2021-10-08 ENCOUNTER — APPOINTMENT (OUTPATIENT)
Dept: GENERAL RADIOLOGY | Facility: CLINIC | Age: 76
DRG: 177 | End: 2021-10-08
Attending: EMERGENCY MEDICINE
Payer: COMMERCIAL

## 2021-10-08 ENCOUNTER — HOSPITAL ENCOUNTER (INPATIENT)
Facility: CLINIC | Age: 76
LOS: 17 days | Discharge: HOME HEALTH | DRG: 177 | End: 2021-10-25
Attending: EMERGENCY MEDICINE | Admitting: FAMILY MEDICINE
Payer: COMMERCIAL

## 2021-10-08 DIAGNOSIS — G47.33 OBSTRUCTIVE SLEEP APNEA: ICD-10-CM

## 2021-10-08 DIAGNOSIS — J44.9 CHRONIC OBSTRUCTIVE PULMONARY DISEASE, UNSPECIFIED COPD TYPE (H): ICD-10-CM

## 2021-10-08 DIAGNOSIS — N17.9 ACUTE KIDNEY INJURY (H): ICD-10-CM

## 2021-10-08 DIAGNOSIS — I50.22 CHRONIC SYSTOLIC CONGESTIVE HEART FAILURE (H): ICD-10-CM

## 2021-10-08 DIAGNOSIS — F09 MILD COGNITIVE DISORDER: Primary | ICD-10-CM

## 2021-10-08 DIAGNOSIS — U07.1 INFECTION DUE TO 2019 NOVEL CORONAVIRUS: ICD-10-CM

## 2021-10-08 DIAGNOSIS — Z95.0 CARDIAC PACEMAKER IN SITU: ICD-10-CM

## 2021-10-08 DIAGNOSIS — R79.89 TROPONIN LEVEL ELEVATED: ICD-10-CM

## 2021-10-08 DIAGNOSIS — E11.9 TYPE 2 DIABETES MELLITUS WITHOUT COMPLICATION, WITHOUT LONG-TERM CURRENT USE OF INSULIN (H): ICD-10-CM

## 2021-10-08 PROBLEM — I42.0 DILATED CARDIOMYOPATHY (H): Status: ACTIVE | Noted: 2021-10-08

## 2021-10-08 PROBLEM — F41.1 GENERALIZED ANXIETY DISORDER: Status: ACTIVE | Noted: 2021-10-08

## 2021-10-08 PROBLEM — I10 BENIGN ESSENTIAL HYPERTENSION: Status: ACTIVE | Noted: 2021-10-08

## 2021-10-08 PROBLEM — G47.00 INSOMNIA: Status: ACTIVE | Noted: 2021-10-08

## 2021-10-08 PROBLEM — F41.0 PANIC DISORDER WITHOUT AGORAPHOBIA: Status: ACTIVE | Noted: 2021-10-08

## 2021-10-08 PROBLEM — F33.40 RECURRENT MAJOR DEPRESSIVE DISORDER, IN REMISSION (H): Status: ACTIVE | Noted: 2021-10-08

## 2021-10-08 PROBLEM — I49.5 SINUS NODE DYSFUNCTION (H): Status: ACTIVE | Noted: 2021-10-08

## 2021-10-08 LAB
ALBUMIN SERPL-MCNC: 3.8 G/DL (ref 3.4–5)
ALBUMIN UR-MCNC: 100 MG/DL
ALP SERPL-CCNC: 87 U/L (ref 40–150)
ALT SERPL W P-5'-P-CCNC: 45 U/L (ref 0–70)
ANION GAP SERPL CALCULATED.3IONS-SCNC: 13 MMOL/L (ref 3–14)
APPEARANCE UR: ABNORMAL
AST SERPL W P-5'-P-CCNC: 108 U/L (ref 0–45)
BASE EXCESS BLDV CALC-SCNC: -3 MMOL/L (ref -7.7–1.9)
BASOPHILS # BLD AUTO: 0 10E3/UL (ref 0–0.2)
BASOPHILS NFR BLD AUTO: 0 %
BILIRUB DIRECT SERPL-MCNC: 0.2 MG/DL (ref 0–0.2)
BILIRUB SERPL-MCNC: 0.8 MG/DL (ref 0.2–1.3)
BILIRUB UR QL STRIP: NEGATIVE
BUN SERPL-MCNC: 45 MG/DL (ref 7–30)
CALCIUM SERPL-MCNC: 9.1 MG/DL (ref 8.5–10.1)
CHLORIDE BLD-SCNC: 97 MMOL/L (ref 94–109)
CK SERPL-CCNC: 686 U/L (ref 30–300)
CO2 SERPL-SCNC: 20 MMOL/L (ref 20–32)
COLOR UR AUTO: YELLOW
CREAT SERPL-MCNC: 2.1 MG/DL (ref 0.66–1.25)
CREAT SERPL-MCNC: 2.1 MG/DL (ref 0.66–1.25)
CREAT UR-MCNC: 194 MG/DL
CRP SERPL-MCNC: 137 MG/L (ref 0–8)
D DIMER PPP FEU-MCNC: 1.43 UG/ML FEU (ref 0–0.5)
EOSINOPHIL # BLD AUTO: 0 10E3/UL (ref 0–0.7)
EOSINOPHIL NFR BLD AUTO: 0 %
ERYTHROCYTE [DISTWIDTH] IN BLOOD BY AUTOMATED COUNT: 12.4 % (ref 10–15)
FIBRINOGEN PPP-MCNC: 633 MG/DL (ref 170–490)
FRACT EXCRET NA UR+SERPL-RTO: 0.1 %
GFR SERPL CREATININE-BSD FRML MDRD: 29 ML/MIN/1.73M2
GLUCOSE BLD-MCNC: 125 MG/DL (ref 70–99)
GLUCOSE BLDC GLUCOMTR-MCNC: 220 MG/DL (ref 70–99)
GLUCOSE BLDC GLUCOMTR-MCNC: 285 MG/DL (ref 70–99)
GLUCOSE UR STRIP-MCNC: >499 MG/DL
HBA1C MFR BLD: 7.2 % (ref 0–5.6)
HCO3 BLDV-SCNC: 23 MMOL/L (ref 21–28)
HCT VFR BLD AUTO: 43.6 % (ref 40–53)
HGB BLD-MCNC: 14.9 G/DL (ref 13.3–17.7)
HGB UR QL STRIP: NEGATIVE
HOLD SPECIMEN: NORMAL
HYALINE CASTS: 3 /LPF
IMM GRANULOCYTES # BLD: 0 10E3/UL
IMM GRANULOCYTES NFR BLD: 1 %
INR PPP: 1.13 (ref 0.85–1.15)
KETONES UR STRIP-MCNC: 5 MG/DL
LEUKOCYTE ESTERASE UR QL STRIP: NEGATIVE
LIPASE SERPL-CCNC: 122 U/L (ref 73–393)
LYMPHOCYTES # BLD AUTO: 0.4 10E3/UL (ref 0.8–5.3)
LYMPHOCYTES NFR BLD AUTO: 8 %
MCH RBC QN AUTO: 32.7 PG (ref 26.5–33)
MCHC RBC AUTO-ENTMCNC: 34.2 G/DL (ref 31.5–36.5)
MCV RBC AUTO: 96 FL (ref 78–100)
MONOCYTES # BLD AUTO: 0.2 10E3/UL (ref 0–1.3)
MONOCYTES NFR BLD AUTO: 4 %
MUCOUS THREADS #/AREA URNS LPF: PRESENT /LPF
NEUTROPHILS # BLD AUTO: 3.8 10E3/UL (ref 1.6–8.3)
NEUTROPHILS NFR BLD AUTO: 87 %
NITRATE UR QL: NEGATIVE
NRBC # BLD AUTO: 0 10E3/UL
NRBC BLD AUTO-RTO: 0 /100
NT-PROBNP SERPL-MCNC: 3907 PG/ML (ref 0–1800)
O2/TOTAL GAS SETTING VFR VENT: 30 %
PCO2 BLDV: 43 MM HG (ref 40–50)
PH BLDV: 7.34 [PH] (ref 7.32–7.43)
PH UR STRIP: 5 [PH] (ref 5–7)
PLATELET # BLD AUTO: 94 10E3/UL (ref 150–450)
PO2 BLDV: 12 MM HG (ref 25–47)
POTASSIUM BLD-SCNC: 5.2 MMOL/L (ref 3.4–5.3)
PROT SERPL-MCNC: 7.9 G/DL (ref 6.8–8.8)
RBC # BLD AUTO: 4.55 10E6/UL (ref 4.4–5.9)
RBC URINE: <1 /HPF
SARS-COV-2 RNA RESP QL NAA+PROBE: POSITIVE
SODIUM SERPL-SCNC: 130 MMOL/L (ref 133–144)
SODIUM SERPL-SCNC: 130 MMOL/L (ref 133–144)
SODIUM UR-SCNC: 10 MMOL/L
SP GR UR STRIP: 1.02 (ref 1–1.03)
SQUAMOUS EPITHELIAL: 2 /HPF
TROPONIN I SERPL-MCNC: 0.11 UG/L (ref 0–0.04)
UROBILINOGEN UR STRIP-MCNC: NORMAL MG/DL
WBC # BLD AUTO: 4.4 10E3/UL (ref 4–11)
WBC URINE: 4 /HPF

## 2021-10-08 PROCEDURE — 85379 FIBRIN DEGRADATION QUANT: CPT | Performed by: EMERGENCY MEDICINE

## 2021-10-08 PROCEDURE — 93005 ELECTROCARDIOGRAM TRACING: CPT | Performed by: FAMILY MEDICINE

## 2021-10-08 PROCEDURE — C9803 HOPD COVID-19 SPEC COLLECT: HCPCS | Performed by: FAMILY MEDICINE

## 2021-10-08 PROCEDURE — 83036 HEMOGLOBIN GLYCOSYLATED A1C: CPT | Performed by: PHYSICIAN ASSISTANT

## 2021-10-08 PROCEDURE — 93010 ELECTROCARDIOGRAM REPORT: CPT | Performed by: FAMILY MEDICINE

## 2021-10-08 PROCEDURE — 86140 C-REACTIVE PROTEIN: CPT | Performed by: PHYSICIAN ASSISTANT

## 2021-10-08 PROCEDURE — 250N000013 HC RX MED GY IP 250 OP 250 PS 637: Performed by: EMERGENCY MEDICINE

## 2021-10-08 PROCEDURE — 82570 ASSAY OF URINE CREATININE: CPT | Performed by: PHYSICIAN ASSISTANT

## 2021-10-08 PROCEDURE — 36415 COLL VENOUS BLD VENIPUNCTURE: CPT | Performed by: PHYSICIAN ASSISTANT

## 2021-10-08 PROCEDURE — 250N000013 HC RX MED GY IP 250 OP 250 PS 637: Performed by: FAMILY MEDICINE

## 2021-10-08 PROCEDURE — 83880 ASSAY OF NATRIURETIC PEPTIDE: CPT | Performed by: EMERGENCY MEDICINE

## 2021-10-08 PROCEDURE — 81001 URINALYSIS AUTO W/SCOPE: CPT | Performed by: FAMILY MEDICINE

## 2021-10-08 PROCEDURE — 343N000001 HC RX 343: Performed by: FAMILY MEDICINE

## 2021-10-08 PROCEDURE — 71045 X-RAY EXAM CHEST 1 VIEW: CPT

## 2021-10-08 PROCEDURE — 99285 EMERGENCY DEPT VISIT HI MDM: CPT | Mod: 25 | Performed by: FAMILY MEDICINE

## 2021-10-08 PROCEDURE — A9540 TC99M MAA: HCPCS | Performed by: FAMILY MEDICINE

## 2021-10-08 PROCEDURE — 84300 ASSAY OF URINE SODIUM: CPT | Performed by: PHYSICIAN ASSISTANT

## 2021-10-08 PROCEDURE — 272N000035 NM LUNG SCAN VENTILATION AND PERFUSION

## 2021-10-08 PROCEDURE — 85025 COMPLETE CBC W/AUTO DIFF WBC: CPT | Performed by: EMERGENCY MEDICINE

## 2021-10-08 PROCEDURE — 78582 LUNG VENTILAT&PERFUS IMAGING: CPT

## 2021-10-08 PROCEDURE — 82550 ASSAY OF CK (CPK): CPT | Performed by: PHYSICIAN ASSISTANT

## 2021-10-08 PROCEDURE — 250N000011 HC RX IP 250 OP 636: Performed by: PHYSICIAN ASSISTANT

## 2021-10-08 PROCEDURE — 82803 BLOOD GASES ANY COMBINATION: CPT | Performed by: EMERGENCY MEDICINE

## 2021-10-08 PROCEDURE — 120N000001 HC R&B MED SURG/OB

## 2021-10-08 PROCEDURE — 85610 PROTHROMBIN TIME: CPT | Performed by: PHYSICIAN ASSISTANT

## 2021-10-08 PROCEDURE — 85384 FIBRINOGEN ACTIVITY: CPT | Performed by: PHYSICIAN ASSISTANT

## 2021-10-08 PROCEDURE — 82565 ASSAY OF CREATININE: CPT | Performed by: PHYSICIAN ASSISTANT

## 2021-10-08 PROCEDURE — 84484 ASSAY OF TROPONIN QUANT: CPT | Performed by: EMERGENCY MEDICINE

## 2021-10-08 PROCEDURE — A9567 TECHNETIUM TC-99M AEROSOL: HCPCS | Performed by: FAMILY MEDICINE

## 2021-10-08 PROCEDURE — 83690 ASSAY OF LIPASE: CPT | Performed by: EMERGENCY MEDICINE

## 2021-10-08 PROCEDURE — 82248 BILIRUBIN DIRECT: CPT | Performed by: EMERGENCY MEDICINE

## 2021-10-08 PROCEDURE — 250N000011 HC RX IP 250 OP 636: Performed by: FAMILY MEDICINE

## 2021-10-08 PROCEDURE — 258N000003 HC RX IP 258 OP 636: Performed by: EMERGENCY MEDICINE

## 2021-10-08 PROCEDURE — 84295 ASSAY OF SERUM SODIUM: CPT | Performed by: PHYSICIAN ASSISTANT

## 2021-10-08 PROCEDURE — 96374 THER/PROPH/DIAG INJ IV PUSH: CPT | Performed by: FAMILY MEDICINE

## 2021-10-08 PROCEDURE — 36415 COLL VENOUS BLD VENIPUNCTURE: CPT | Performed by: EMERGENCY MEDICINE

## 2021-10-08 PROCEDURE — 99223 1ST HOSP IP/OBS HIGH 75: CPT | Mod: AI | Performed by: FAMILY MEDICINE

## 2021-10-08 PROCEDURE — 87635 SARS-COV-2 COVID-19 AMP PRB: CPT | Performed by: EMERGENCY MEDICINE

## 2021-10-08 PROCEDURE — 80053 COMPREHEN METABOLIC PANEL: CPT | Performed by: EMERGENCY MEDICINE

## 2021-10-08 PROCEDURE — 99284 EMERGENCY DEPT VISIT MOD MDM: CPT | Performed by: FAMILY MEDICINE

## 2021-10-08 PROCEDURE — 96361 HYDRATE IV INFUSION ADD-ON: CPT | Performed by: FAMILY MEDICINE

## 2021-10-08 RX ORDER — BRIMONIDINE TARTRATE 2 MG/ML
1 SOLUTION/ DROPS OPHTHALMIC 2 TIMES DAILY
Status: DISCONTINUED | OUTPATIENT
Start: 2021-10-08 | End: 2021-10-08

## 2021-10-08 RX ORDER — METOPROLOL SUCCINATE 25 MG/1
25 TABLET, EXTENDED RELEASE ORAL DAILY
Status: DISCONTINUED | OUTPATIENT
Start: 2021-10-08 | End: 2021-10-16

## 2021-10-08 RX ORDER — ALBUTEROL SULFATE 90 UG/1
2 AEROSOL, METERED RESPIRATORY (INHALATION) 4 TIMES DAILY PRN
COMMUNITY
Start: 2020-12-22

## 2021-10-08 RX ORDER — DONEPEZIL HYDROCHLORIDE 10 MG/1
10 TABLET, FILM COATED ORAL DAILY
Status: DISCONTINUED | OUTPATIENT
Start: 2021-10-08 | End: 2021-10-08

## 2021-10-08 RX ORDER — GLIPIZIDE 5 MG/1
5 TABLET ORAL DAILY
COMMUNITY
Start: 2021-03-16

## 2021-10-08 RX ORDER — SIMVASTATIN 40 MG
20 TABLET ORAL AT BEDTIME
COMMUNITY
Start: 2021-09-13

## 2021-10-08 RX ORDER — LIDOCAINE 40 MG/G
CREAM TOPICAL
Status: DISCONTINUED | OUTPATIENT
Start: 2021-10-08 | End: 2021-10-25 | Stop reason: HOSPADM

## 2021-10-08 RX ORDER — ZOLPIDEM TARTRATE 12.5 MG/1
12.5 TABLET, FILM COATED, EXTENDED RELEASE ORAL AT BEDTIME
Status: ON HOLD | COMMUNITY
Start: 2021-07-15 | End: 2021-10-08

## 2021-10-08 RX ORDER — ZOLPIDEM TARTRATE 5 MG/1
5 TABLET ORAL
Status: DISCONTINUED | OUTPATIENT
Start: 2021-10-08 | End: 2021-10-15

## 2021-10-08 RX ORDER — DOXYCYCLINE HYCLATE 50 MG/1
50 CAPSULE ORAL DAILY
Status: DISCONTINUED | OUTPATIENT
Start: 2021-10-08 | End: 2021-10-25 | Stop reason: HOSPADM

## 2021-10-08 RX ORDER — MECLIZINE HCL 12.5 MG 12.5 MG/1
12.5 TABLET ORAL EVERY 12 HOURS PRN
Status: DISCONTINUED | OUTPATIENT
Start: 2021-10-08 | End: 2021-10-25 | Stop reason: HOSPADM

## 2021-10-08 RX ORDER — PAROXETINE 40 MG/1
40 TABLET, FILM COATED ORAL AT BEDTIME
COMMUNITY
Start: 2021-02-17

## 2021-10-08 RX ORDER — AMOXICILLIN 250 MG
2 CAPSULE ORAL 2 TIMES DAILY PRN
Status: DISCONTINUED | OUTPATIENT
Start: 2021-10-08 | End: 2021-10-25 | Stop reason: HOSPADM

## 2021-10-08 RX ORDER — AMOXICILLIN 250 MG
1 CAPSULE ORAL 2 TIMES DAILY PRN
Status: DISCONTINUED | OUTPATIENT
Start: 2021-10-08 | End: 2021-10-25 | Stop reason: HOSPADM

## 2021-10-08 RX ORDER — ASPIRIN 81 MG/1
324 TABLET, CHEWABLE ORAL ONCE
Status: COMPLETED | OUTPATIENT
Start: 2021-10-08 | End: 2021-10-08

## 2021-10-08 RX ORDER — ONDANSETRON 2 MG/ML
4 INJECTION INTRAMUSCULAR; INTRAVENOUS EVERY 6 HOURS PRN
Status: DISCONTINUED | OUTPATIENT
Start: 2021-10-08 | End: 2021-10-25 | Stop reason: HOSPADM

## 2021-10-08 RX ORDER — ZOLPIDEM TARTRATE 6.25 MG/1
6.25 TABLET, FILM COATED, EXTENDED RELEASE ORAL
Status: DISCONTINUED | OUTPATIENT
Start: 2021-10-08 | End: 2021-10-08 | Stop reason: CLARIF

## 2021-10-08 RX ORDER — NICOTINE POLACRILEX 4 MG
15-30 LOZENGE BUCCAL
Status: DISCONTINUED | OUTPATIENT
Start: 2021-10-08 | End: 2021-10-25 | Stop reason: HOSPADM

## 2021-10-08 RX ORDER — SIMVASTATIN 20 MG
20 TABLET ORAL AT BEDTIME
Status: DISCONTINUED | OUTPATIENT
Start: 2021-10-08 | End: 2021-10-25 | Stop reason: HOSPADM

## 2021-10-08 RX ORDER — PROCHLORPERAZINE MALEATE 5 MG
5 TABLET ORAL EVERY 6 HOURS PRN
Status: DISCONTINUED | OUTPATIENT
Start: 2021-10-08 | End: 2021-10-25 | Stop reason: HOSPADM

## 2021-10-08 RX ORDER — ACETAMINOPHEN 325 MG/1
650 TABLET ORAL EVERY 6 HOURS PRN
Status: DISCONTINUED | OUTPATIENT
Start: 2021-10-08 | End: 2021-10-25 | Stop reason: HOSPADM

## 2021-10-08 RX ORDER — FUROSEMIDE 20 MG
20 TABLET ORAL 2 TIMES DAILY PRN
Status: ON HOLD | COMMUNITY
Start: 2021-07-27 | End: 2021-10-25

## 2021-10-08 RX ORDER — MELOXICAM 15 MG/1
15 TABLET ORAL DAILY
Status: ON HOLD | COMMUNITY
Start: 2021-06-02 | End: 2021-10-25

## 2021-10-08 RX ORDER — ALBUTEROL SULFATE 90 UG/1
2 AEROSOL, METERED RESPIRATORY (INHALATION) EVERY 6 HOURS PRN
Status: DISCONTINUED | OUTPATIENT
Start: 2021-10-08 | End: 2021-10-08

## 2021-10-08 RX ORDER — ALPRAZOLAM 0.25 MG
0.25 TABLET ORAL 2 TIMES DAILY PRN
Status: DISCONTINUED | OUTPATIENT
Start: 2021-10-08 | End: 2021-10-08

## 2021-10-08 RX ORDER — DOXYCYCLINE HYCLATE 50 MG/1
50 CAPSULE ORAL DAILY
COMMUNITY
Start: 2021-03-01

## 2021-10-08 RX ORDER — DEXAMETHASONE SODIUM PHOSPHATE 10 MG/ML
10 INJECTION, SOLUTION INTRAMUSCULAR; INTRAVENOUS ONCE
Status: COMPLETED | OUTPATIENT
Start: 2021-10-08 | End: 2021-10-08

## 2021-10-08 RX ORDER — ACETAMINOPHEN 650 MG/1
650 SUPPOSITORY RECTAL EVERY 6 HOURS PRN
Status: DISCONTINUED | OUTPATIENT
Start: 2021-10-08 | End: 2021-10-25 | Stop reason: HOSPADM

## 2021-10-08 RX ORDER — PAROXETINE 20 MG/1
40 TABLET, FILM COATED ORAL AT BEDTIME
Status: DISCONTINUED | OUTPATIENT
Start: 2021-10-08 | End: 2021-10-25 | Stop reason: HOSPADM

## 2021-10-08 RX ORDER — LISINOPRIL 5 MG/1
2.5 TABLET ORAL DAILY
Status: ON HOLD | COMMUNITY
Start: 2021-09-12 | End: 2021-10-08

## 2021-10-08 RX ORDER — BRIMONIDINE TARTRATE 2 MG/ML
1 SOLUTION/ DROPS OPHTHALMIC 2 TIMES DAILY
Status: ON HOLD | COMMUNITY
Start: 2021-08-06 | End: 2021-10-08

## 2021-10-08 RX ORDER — ONDANSETRON 4 MG/1
4 TABLET, ORALLY DISINTEGRATING ORAL EVERY 6 HOURS PRN
Status: DISCONTINUED | OUTPATIENT
Start: 2021-10-08 | End: 2021-10-25 | Stop reason: HOSPADM

## 2021-10-08 RX ORDER — LAMOTRIGINE 150 MG/1
150 TABLET ORAL 2 TIMES DAILY
COMMUNITY
Start: 2021-08-30

## 2021-10-08 RX ORDER — METOPROLOL SUCCINATE 50 MG/1
25 TABLET, EXTENDED RELEASE ORAL DAILY
Status: ON HOLD | COMMUNITY
Start: 2021-09-12 | End: 2021-10-25

## 2021-10-08 RX ORDER — PROCHLORPERAZINE 25 MG
12.5 SUPPOSITORY, RECTAL RECTAL EVERY 12 HOURS PRN
Status: DISCONTINUED | OUTPATIENT
Start: 2021-10-08 | End: 2021-10-25 | Stop reason: HOSPADM

## 2021-10-08 RX ORDER — ALBUTEROL SULFATE 90 UG/1
2 AEROSOL, METERED RESPIRATORY (INHALATION) 4 TIMES DAILY
Status: DISCONTINUED | OUTPATIENT
Start: 2021-10-08 | End: 2021-10-25 | Stop reason: HOSPADM

## 2021-10-08 RX ORDER — DEXTROSE MONOHYDRATE 25 G/50ML
25-50 INJECTION, SOLUTION INTRAVENOUS
Status: DISCONTINUED | OUTPATIENT
Start: 2021-10-08 | End: 2021-10-25 | Stop reason: HOSPADM

## 2021-10-08 RX ORDER — HEPARIN SODIUM 5000 [USP'U]/.5ML
5000 INJECTION, SOLUTION INTRAVENOUS; SUBCUTANEOUS EVERY 8 HOURS SCHEDULED
Status: DISCONTINUED | OUTPATIENT
Start: 2021-10-08 | End: 2021-10-10

## 2021-10-08 RX ORDER — ALBUTEROL SULFATE 90 UG/1
2 AEROSOL, METERED RESPIRATORY (INHALATION)
Status: DISCONTINUED | OUTPATIENT
Start: 2021-10-08 | End: 2021-10-25 | Stop reason: HOSPADM

## 2021-10-08 RX ADMIN — KIT FOR THE PREPARATION OF TECHNETIUM TC 99M ALBUMIN AGGREGATED 5.3 MILLICURIE: 2.5 INJECTION, POWDER, FOR SOLUTION INTRAVENOUS at 08:11

## 2021-10-08 RX ADMIN — DEXAMETHASONE SODIUM PHOSPHATE 10 MG: 10 INJECTION, SOLUTION INTRAMUSCULAR; INTRAVENOUS at 07:28

## 2021-10-08 RX ADMIN — KIT FOR THE PREPARATION OF TECHNETIUM TC 99M PENTETATE 63 MILLICURIE: 20 INJECTION, POWDER, LYOPHILIZED, FOR SOLUTION INTRAVENOUS; RESPIRATORY (INHALATION) at 08:11

## 2021-10-08 RX ADMIN — DOXYCYCLINE HYCLATE 50 MG: 50 CAPSULE ORAL at 20:55

## 2021-10-08 RX ADMIN — PAROXETINE HYDROCHLORIDE 40 MG: 20 TABLET, FILM COATED ORAL at 21:13

## 2021-10-08 RX ADMIN — SIMVASTATIN 20 MG: 20 TABLET, FILM COATED ORAL at 21:13

## 2021-10-08 RX ADMIN — METOPROLOL SUCCINATE 25 MG: 25 TABLET, FILM COATED, EXTENDED RELEASE ORAL at 20:56

## 2021-10-08 RX ADMIN — ALBUTEROL SULFATE 2 PUFF: 90 AEROSOL, METERED RESPIRATORY (INHALATION) at 20:54

## 2021-10-08 RX ADMIN — ALBUTEROL SULFATE 2 PUFF: 90 AEROSOL, METERED RESPIRATORY (INHALATION) at 07:28

## 2021-10-08 RX ADMIN — INSULIN ASPART 2 UNITS: 100 INJECTION, SOLUTION INTRAVENOUS; SUBCUTANEOUS at 17:35

## 2021-10-08 RX ADMIN — ASPIRIN 81 MG CHEWABLE TABLET 324 MG: 81 TABLET CHEWABLE at 06:28

## 2021-10-08 RX ADMIN — SODIUM CHLORIDE 500 ML: 9 INJECTION, SOLUTION INTRAVENOUS at 07:28

## 2021-10-08 RX ADMIN — LAMOTRIGINE 150 MG: 100 TABLET ORAL at 20:56

## 2021-10-08 RX ADMIN — HEPARIN SODIUM 5000 UNITS: 10000 INJECTION, SOLUTION INTRAVENOUS; SUBCUTANEOUS at 16:17

## 2021-10-08 ASSESSMENT — ACTIVITIES OF DAILY LIVING (ADL)
ADLS_ACUITY_SCORE: 10
HEARING_DIFFICULTY_OR_DEAF: YES
DRESSING/BATHING_DIFFICULTY: NO
TOILETING_ISSUES: NO
FALL_HISTORY_WITHIN_LAST_SIX_MONTHS: NO
WALKING_OR_CLIMBING_STAIRS_DIFFICULTY: NO
CONCENTRATING,_REMEMBERING_OR_MAKING_DECISIONS_DIFFICULTY: NO
WEAR_GLASSES_OR_BLIND: NO
DOING_ERRANDS_INDEPENDENTLY_DIFFICULTY: NO
ADLS_ACUITY_SCORE: 7
PATIENT'S_PREFERRED_MEANS_OF_COMMUNICATION: OTHER
DIFFICULTY_EATING/SWALLOWING: NO
DIFFICULTY_COMMUNICATING: NO
PATIENT_/_FAMILY_COMMUNICATION_STYLE: SPOKEN LANGUAGE (ENGLISH OR BILINGUAL)
USE_OF_HEARING_ASSISTIVE_DEVICES: RIGHT HEARING AID;LEFT HEARING AID
DESCRIBE_HEARING_LOSS: BILATERAL HEARING LOSS
ADLS_ACUITY_SCORE: 10
WERE_AUXILIARY_AIDS_OFFERED?: NO

## 2021-10-08 NOTE — ED PROVIDER NOTES
Emergency Department Patient Sign-out       Brief HPI:  This is a 75 year old male signed out to me by Dr. Whitman .  See initial ED Provider note for details of the presentation.     Presenting with COPD, congestive heart failure, diabetes type 2 DM, pacemaker, obstructive sleep apnea  Presents with 3 to 4 days of symptoms increased shortness of breath, congested nonproductive cough.  [facility].   He has a positive Covid here, elevated BNP, D-dimer elevation but renal failure possibly acute on chronic.         Significant Events prior to my assuming care: \    Exam:   Patient Vitals for the past 24 hrs:   BP Temp Temp src Pulse SpO2   10/08/21 0630 105/61 -- -- 93 92 %   10/08/21 0600 97/62 -- -- 68 --   10/08/21 0530 100/56 -- -- 76 --   10/08/21 0500 97/65 -- -- 73 --   10/08/21 0430 102/57 -- -- 81 (!) 43 %   10/08/21 0424 102/57 99.4  F (37.4  C) Oral 59 93 %           ED RESULTS:   Results for orders placed or performed during the hospital encounter of 10/08/21 (from the past 24 hour(s))   [place] Draw     Status: None    Collection Time: 10/08/21  4:42 AM    Narrative    The following orders were created for panel order [place] Draw.  Procedure                               Abnormality         Status                     ---------                               -----------         ------                     Extra Blue Top Tube[[id number]]                              Final result               Extra Red Top Tube[[id number]]                               Final result               Extra Green Top (Lithium...[[id number]]                      Final result               Extra Green Top (Lithium...[[id number]]                      Final result               Extra Purple Top Tube[[id number]]                            Final result                 Please view results for these tests on the individual orders.   CBC with Platelets & Differential     Status: Abnormal    Collection Time: 10/08/21  4:42 AM    Narrative     The following orders were created for panel order CBC with Platelets & Differential.  Procedure                               Abnormality         Status                     ---------                               -----------         ------                     CBC with platelets and d...[[id number]]  Abnormal            Final result                 Please view results for these tests on the individual orders.   Basic metabolic panel     Status: Abnormal    Collection Time: 10/08/21  4:42 AM   Result Value Ref Range    Sodium 130 (L) 133 - 144 mmol/L    Potassium 5.2 3.4 - 5.3 mmol/L    Chloride 97 94 - 109 mmol/L    Carbon Dioxide (CO2) 20 20 - 32 mmol/L    Anion Gap 13 3 - 14 mmol/L    Urea Nitrogen 45 (H) 7 - 30 mg/dL    Creatinine 2.10 (H) 0.66 - 1.25 mg/dL    Calcium 9.1 8.5 - 10.1 mg/dL    Glucose 125 (H) 70 - 99 mg/dL    GFR Estimate 29 (L) >60 mL/min/1.73m2   Troponin I     Status: Abnormal    Collection Time: 10/08/21  4:42 AM   Result Value Ref Range    Troponin I 0.107 (H) 0.000 - 0.045 ug/L   Blood gas venous     Status: Abnormal    Collection Time: 10/08/21  4:42 AM   Result Value Ref Range    pH Venous 7.34 7.32 - 7.43    pCO2 Venous 43 40 - 50 mm Hg    pO2 Venous 12 (L) 25 - 47 mm Hg    Bicarbonate Venous 23 21 - 28 mmol/L    Base Excess/Deficit (+/-) -3.0 -7.7 - 1.9 mmol/L    FIO2 30    Extra Blue Top Tube     Status: None    Collection Time: 10/08/21  4:42 AM   Result Value Ref Range    Hold Specimen JIC    Extra Red Top Tube     Status: None    Collection Time: 10/08/21  4:42 AM   Result Value Ref Range    Hold Specimen JIC    Extra Green Top (Lithium Heparin) Tube     Status: None    Collection Time: 10/08/21  4:42 AM   Result Value Ref Range    Hold Specimen JIC    Extra Green Top (Lithium Heparin) Tube     Status: None    Collection Time: 10/08/21  4:42 AM   Result Value Ref Range    Hold Specimen JIC    Extra Purple Top Tube     Status: None    Collection Time: 10/08/21  4:42 AM   Result Value Ref  Range    Hold Specimen [facility]    CBC with platelets and differential     Status: Abnormal    Collection Time: 10/08/21  4:42 AM   Result Value Ref Range    WBC Count 4.4 4.0 - 11.0 10e3/uL    RBC Count 4.55 4.40 - 5.90 10e6/uL    Hemoglobin 14.9 13.3 - 17.7 g/dL    Hematocrit 43.6 40.0 - 53.0 %    MCV 96 78 - 100 fL    MCH 32.7 26.5 - 33.0 pg    MCHC 34.2 31.5 - 36.5 g/dL    RDW 12.4 10.0 - 15.0 %    Platelet Count 94 (L) 150 - 450 10e3/uL    % Neutrophils 87 %    % Lymphocytes 8 %    % Monocytes 4 %    % Eosinophils 0 %    % Basophils 0 %    % Immature Granulocytes 1 %    NRBCs per 100 WBC 0 <1 /100    Absolute Neutrophils 3.8 1.6 - 8.3 10e3/uL    Absolute Lymphocytes 0.4 (L) 0.8 - 5.3 10e3/uL    Absolute Monocytes 0.2 0.0 - 1.3 10e3/uL    Absolute Eosinophils 0.0 0.0 - 0.7 10e3/uL    Absolute Basophils 0.0 0.0 - 0.2 10e3/uL    Absolute Immature Granulocytes 0.0 <=0.0 10e3/uL    Absolute NRBCs 0.0 10e3/uL   NT pro BNP     Status: Abnormal    Collection Time: 10/08/21  4:42 AM   Result Value Ref Range    N terminal Pro BNP Inpatient 3,907 (H) 0-1,800 pg/mL   Hepatic panel     Status: Abnormal    Collection Time: 10/08/21  4:42 AM   Result Value Ref Range    Bilirubin Total 0.8 0.2 - 1.3 mg/dL    Bilirubin Direct 0.2 0.0 - 0.2 mg/dL    Protein Total 7.9 6.8 - 8.8 g/dL    Albumin 3.8 3.4 - 5.0 g/dL    Alkaline Phosphatase 87 40 - 150 U/L     (H) 0 - 45 U/L    ALT 45 0 - 70 U/L   Lipase     Status: Normal    Collection Time: 10/08/21  4:42 AM   Result Value Ref Range    Lipase 122 73 - 393 U/L   D dimer quantitative     Status: Abnormal    Collection Time: 10/08/21  4:42 AM   Result Value Ref Range    D-Dimer Quantitative 1.43 (H) 0.00 - 0.50 ug/mL FEU    Narrative    This D-dimer assay is intended for use in conjunction with a clinical pretest probability assessment model to exclude pulmonary embolism (PE) and deep venous thrombosis (DVT) in outpatients suspected of PE or DVT. The cut-off value is 0.50 ug/mL FEU.    Symptomatic COVID-19 Virus (Coronavirus) by PCR Nasopharyngeal     Status: Abnormal    Collection Time: 10/08/21  4:49 AM    Specimen: Nasopharyngeal; Swab   Result Value Ref Range    SARS CoV2 PCR Positive (A) Negative    Narrative    Testing was performed using the igor  SARS-CoV-2 & Influenza A/B Assay on the igor  Ирина  System.  This test should be ordered for the detection of SARS-COV-2 in individuals who meet SARS-CoV-2 clinical and/or epidemiological criteria. Test performance is unknown in asymptomatic patients.  This test is for in vitro diagnostic use under the FDA EUA for laboratories certified under CLIA to perform moderate and/or high complexity testing. This test has not been FDA cleared or approved.  A negative test does not rule out the presence of PCR inhibitors in the specimen or target RNA in concentration below the limit of detection for the assay. The possibility of a false negative should be considered if the patient's recent exposure or clinical presentation suggests COVID-19.  [facility] are certified under the Clinical Laboratory Improvement Amendments of 1988 (CLIA-88) as qualified to perform moderate and/or high complexity laboratory testing.       ED MEDICATIONS:   Medications   aspirin (ASA) chewable tablet 324 mg (324 mg Oral Given 10/8/21 0628)         Impression:    ICD-10-CM    1. Infection due to 2019 novel coronavirus  U07.1    2. Chronic obstructive pulmonary disease, unspecified COPD type (H)  J44.9    3. Chronic systolic congestive heart failure (H)  I50.22    4. Type 2 diabetes mellitus without complication, without long-term current use of insulin (H)  E11.9    5. Obstructive sleep apnea  G47.33    6. Acute kidney injury (H)  N17.9    7. Troponin level elevated  R77.8    8. Cardiac pacemaker in situ  Z95.0        Plan:    Pending studies include great excellent nuclear medicine scan, chest x-ray, will also give Decadron, albuterol.  Looking for a  hospital bed for this patient likely MedSurg currently on oxygen.  Elevation of his troponin into moderate range of 0.1.  [facility].  We will keep the patient here -for Covid positive with COPD and hypoxia.      MD Raheem Martinez Scott J, MD  10/08/21 9231

## 2021-10-08 NOTE — ED PROVIDER NOTES
History     Chief Complaint   Patient presents with     Cough     Shortness of Breath     Dizziness     HPI  Remington Gutierrez is a 75 year old male with past medical history significant for COPD chronic systolic congestive heart failure diabetes type 2, pacemaker, obstructive sleep apnea who presents the emergency department complaining of increasing shortness of breath.  Patient states symptoms have been going on for the last 3 or 4 days.  He has been more congested and feeling short of breath especially with activity and lying down.  He has had a nonproductive cough.  He denies any headache visual changes is not any neck pain denies any focal numbness weakness in extremity he denies any bowel or bladder dysfunction he has not had nausea vomiting diarrhea or abdominal pain.  He denies any swelling in his legs.  He has not had any calf pain.  All systems reviewed and other than pertinent positives negatives in HPI all other systems are negative.    Allergies:  Allergies   Allergen Reactions     Neomycin Rash       Problem List:    Patient Active Problem List    Diagnosis Date Noted     Heart failure, unspecified HF chronicity, unspecified heart failure type (H) 03/02/2021     Priority: Medium     Chronic systolic congestive heart failure (H) 03/02/2021     Priority: Medium     COPD exacerbation (H) 01/13/2020     Priority: Medium     Type 2 diabetes mellitus without complication, without long-term current use of insulin (H) 03/22/2017     Priority: Medium     Advanced directives, counseling/discussion 01/21/2013     Priority: Medium     Patient has completed an Advance/Health Care Directive (HCD), scanned into Epic Media tab, entry date of 10/24/2008.    Kaitlin Givens  January 21, 2013         HYPERLIPIDEMIA LDL GOAL <100 10/31/2010     Priority: Medium     Backache 03/12/2009     Priority: Medium     Problem list name updated by automated process. Provider to review       Obstructive sleep apnea       Priority: Medium     Dx 2004: AHI 24.8, PLMAI 9.8, PLMI swelling of the ankles arousal 73.8. CPAP 10cm. Sleep study 11/08- Sleep latency 8 minutes with Ambien.  REM achieved.   REM latency 278 minutes.  Sleep efficiency 87%. Sleep architecture:  Stage 1, 29.8% (5%), stage 2, 61% (45-55%), stage 3, 0% (15-20%), stage REM, 8.5% (20-25%).  AHI was 10.8, with mild desaturations down to 88%.  REM RDI 60.8, consistent with excessive  REM BENNY.  Supine RDI 57.5, consistent with excessive  SUPINE BENNY.  Periodic Limb Movement Index 101.5/hour.  PLMAI 15.5.   Problem list name updated by automated process. Provider to review       Sensorineural Hearing Loss, Bilateral 06/28/2006     Priority: Medium     Obesity 11/20/2008     Priority: Low     Osteoarthritis      Priority: Low     Osteoarthritis  Problem list name updated by automated process. Provider to review       Memory loss      Priority: Low     Aricept started 2006       Dysthymic disorder 09/12/2006     Priority: Low     Formerly followed by Dr. Champion, [facility]. Now Dr. Kaufman (Last name unknown) will be taking over.          Past Medical History:    Past Medical History:   Diagnosis Date     BACKACHE NOS 7/12/2007       Past Surgical History:    Past Surgical History:   Procedure Laterality Date     COLONOSCOPY  7/2/03     ORTHOPEDIC SURGERY       SURGICAL HISTORY OF -   2004    Arthroscopy (L) Knee      SURGICAL HISTORY OF -   9/08    left TKT     SURGICAL HISTORY OF -   10/08    Right THR       Family History:    Family History   Problem Relation Age of Onset     Respiratory Mother         copd     Heart Disease Mother      Cardiovascular Mother 75        MI     Coronary Artery Disease Mother      Cancer Father         kidney     Arthritis Brother      Cardiovascular Brother      Chronic Obstructive Pulmonary Disease Brother      Rheumatoid Arthritis Brother      Hyperlipidemia Brother      Allergies Sister       Depression Sister      Coronary Artery Disease Maternal Grandmother      Cerebrovascular Disease Brother      Diabetes Brother      Hypertension Brother      Hyperlipidemia Brother      Prostate Cancer Brother      Cerebrovascular Disease Brother      Hyperlipidemia Brother      Depression Brother      Family History Negative No family hx of        Social History:  Marital Status:   [5]  Social History     Tobacco Use     Smoking status: Former Smoker     Packs/day: 2.00     Years: 25.00     Pack years: 50.00     Types: Cigarettes     Quit date: 2005     Years since quittin.6     Smokeless tobacco: Never Used   Substance Use Topics     Alcohol use: Yes     Comment: very little     Drug use: No        Medications:    ALPRAZOLAM PO  betamethasone valerate 0.1 % EX external ointment  DONEPEZIL HCL PO  DOXYCYCLINE HYCLATE PO  LAMOTRIGINE PO  lisinopril (PRINIVIL/ZESTRIL) 10 MG tablet  meclizine (ANTIVERT) 12.5 MG tablet  MELOXICAM PO  metFORMIN (GLUCOPHAGE) 500 MG tablet  PAROXETINE HCL PO  SIMVASTATIN PO  zolpidem (AMBIEN CR) 12.5 MG CR tablet          Review of Systems  All systems reviewed and other than pertinent positives and negatives in HPI all other systems are negative.  Physical Exam   BP: 102/57  Pulse: 59  Temp: 99.4  F (37.4  C)  SpO2: 93 %      Physical Exam  Vitals and nursing note reviewed.   Constitutional:       Appearance: He is well-developed. He is ill-appearing. He is not toxic-appearing or diaphoretic.      Comments: Mild respiratory distress.   HENT:      Head: Normocephalic and atraumatic.      Nose: Nose normal.   Eyes:      Conjunctiva/sclera: Conjunctivae normal.   Neck:      Comments: No JVD noted  Cardiovascular:      Pulses: Normal pulses.      Heart sounds: Normal heart sounds. No murmur heard.     Pulmonary:      Comments: Faint expiratory wheeze with breath sounds decreased at bases patient is slightly tachypneic.  Abdominal:      General: Abdomen is flat. Bowel sounds  are normal. There is no distension.      Palpations: Abdomen is soft.      Tenderness: There is no abdominal tenderness. There is no right CVA tenderness or left CVA tenderness.   Musculoskeletal:         General: No swelling or tenderness. Normal range of motion.      Cervical back: Normal range of motion and neck supple. No tenderness.      Right lower leg: No edema.      Left lower leg: No edema.   Skin:     General: Skin is warm and dry.      Capillary Refill: Capillary refill takes less than 2 seconds.      Findings: No rash.   Neurological:      General: No focal deficit present.      Mental Status: He is alert and oriented to person, place, and time.      Motor: No weakness.      Coordination: Coordination normal.   Psychiatric:         Mood and Affect: Mood normal.         ED Course        Procedures              EKG Interpretation:      Interpreted by Lonny Whitman MD  Rhythm: paced  Rate: Normal  Axis: Normal  Ectopy: none  Conduction: nonspecific interventricular conduction block  ST Segments/ T Waves: Non-specific ST-T wave changes  Q Waves: nonspecific  Comparison to prior: Unchanged    Clinical Impression: Paced rhythm                 Critical Care time:  was  30 minutes for this patient excluding procedures.  For management of acute on chronic hypoxia               Results for orders placed or performed during the hospital encounter of 10/08/21 (from the past 24 hour(s))   [place] Draw    Narrative    The following orders were created for panel order [place] Draw.  Procedure                               Abnormality         Status                     ---------                               -----------         ------                     Extra Blue Top Tube[[id number]]                              Final result               Extra Red Top Tube[[id number]]                               Final result               Extra Green Top (Lithium...[[id number]]                      Final result                Extra Green Top (Lithium...[[id number]]                      Final result               Extra Purple Top Tube[[id number]]                            Final result                 Please view results for these tests on the individual orders.   CBC with Platelets & Differential    Narrative    The following orders were created for panel order CBC with Platelets & Differential.  Procedure                               Abnormality         Status                     ---------                               -----------         ------                     CBC with platelets and d...[[id number]]  Abnormal            Final result                 Please view results for these tests on the individual orders.   Basic metabolic panel   Result Value Ref Range    Sodium 130 (L) 133 - 144 mmol/L    Potassium 5.2 3.4 - 5.3 mmol/L    Chloride 97 94 - 109 mmol/L    Carbon Dioxide (CO2) 20 20 - 32 mmol/L    Anion Gap 13 3 - 14 mmol/L    Urea Nitrogen 45 (H) 7 - 30 mg/dL    Creatinine 2.10 (H) 0.66 - 1.25 mg/dL    Calcium 9.1 8.5 - 10.1 mg/dL    Glucose 125 (H) 70 - 99 mg/dL    GFR Estimate 29 (L) >60 mL/min/1.73m2   Troponin I   Result Value Ref Range    Troponin I 0.107 (H) 0.000 - 0.045 ug/L   Blood gas venous   Result Value Ref Range    pH Venous 7.34 7.32 - 7.43    pCO2 Venous 43 40 - 50 mm Hg    pO2 Venous 12 (L) 25 - 47 mm Hg    Bicarbonate Venous 23 21 - 28 mmol/L    Base Excess/Deficit (+/-) -3.0 -7.7 - 1.9 mmol/L    FIO2 30    Extra Blue Top Tube   Result Value Ref Range    Hold Specimen JIC    Extra Red Top Tube   Result Value Ref Range    Hold Specimen JIC    Extra Green Top (Lithium Heparin) Tube   Result Value Ref Range    Hold Specimen JIC    Extra Green Top (Lithium Heparin) Tube   Result Value Ref Range    Hold Specimen JIC    Extra Purple Top Tube   Result Value Ref Range    Hold Specimen JIC    CBC with platelets and differential   Result Value Ref Range    WBC Count 4.4 4.0 - 11.0 10e3/uL    RBC Count 4.55 4.40 - 5.90  10e6/uL    Hemoglobin 14.9 13.3 - 17.7 g/dL    Hematocrit 43.6 40.0 - 53.0 %    MCV 96 78 - 100 fL    MCH 32.7 26.5 - 33.0 pg    MCHC 34.2 31.5 - 36.5 g/dL    RDW 12.4 10.0 - 15.0 %    Platelet Count 94 (L) 150 - 450 10e3/uL    % Neutrophils 87 %    % Lymphocytes 8 %    % Monocytes 4 %    % Eosinophils 0 %    % Basophils 0 %    % Immature Granulocytes 1 %    NRBCs per 100 WBC 0 <1 /100    Absolute Neutrophils 3.8 1.6 - 8.3 10e3/uL    Absolute Lymphocytes 0.4 (L) 0.8 - 5.3 10e3/uL    Absolute Monocytes 0.2 0.0 - 1.3 10e3/uL    Absolute Eosinophils 0.0 0.0 - 0.7 10e3/uL    Absolute Basophils 0.0 0.0 - 0.2 10e3/uL    Absolute Immature Granulocytes 0.0 <=0.0 10e3/uL    Absolute NRBCs 0.0 10e3/uL   NT pro BNP   Result Value Ref Range    N terminal Pro BNP Inpatient 3,907 (H) 0-1,800 pg/mL   Hepatic panel   Result Value Ref Range    Bilirubin Total 0.8 0.2 - 1.3 mg/dL    Bilirubin Direct 0.2 0.0 - 0.2 mg/dL    Protein Total 7.9 6.8 - 8.8 g/dL    Albumin 3.8 3.4 - 5.0 g/dL    Alkaline Phosphatase 87 40 - 150 U/L     (H) 0 - 45 U/L    ALT 45 0 - 70 U/L   Lipase   Result Value Ref Range    Lipase 122 73 - 393 U/L   D dimer quantitative   Result Value Ref Range    D-Dimer Quantitative 1.43 (H) 0.00 - 0.50 ug/mL FEU    Narrative    This D-dimer assay is intended for use in conjunction with a clinical pretest probability assessment model to exclude pulmonary embolism (PE) and deep venous thrombosis (DVT) in outpatients suspected of PE or DVT. The cut-off value is 0.50 ug/mL FEU.   Symptomatic COVID-19 Virus (Coronavirus) by PCR Nasopharyngeal    Specimen: Nasopharyngeal; Swab   Result Value Ref Range    SARS CoV2 PCR Positive (A) Negative    Narrative    Testing was performed using the igor  SARS-CoV-2 & Influenza A/B Assay on the igor  Ирина  System.  This test should be ordered for the detection of SARS-COV-2 in individuals who meet SARS-CoV-2 clinical and/or epidemiological criteria. Test performance is unknown in  asymptomatic patients.  This test is for in vitro diagnostic use under the FDA EUA for laboratories certified under CLIA to perform moderate and/or high complexity testing. This test has not been FDA cleared or approved.  A negative test does not rule out the presence of PCR inhibitors in the specimen or target RNA in concentration below the limit of detection for the assay. The possibility of a false negative should be considered if the patient's recent exposure or clinical presentation suggests COVID-19.  [facility] are certified under the Clinical Laboratory Improvement Amendments of 1988 (CLIA-88) as qualified to perform moderate and/or high complexity laboratory testing.       Medications - No data to display    Assessments & Plan (with Medical Decision Making) cards were reviewed.  EKG revealed a partially paced sinus rhythm with nonspecific QRS widening and anterior Q waves.  No significant change from previous.  CBC with a platelet count of 94.  Platelet count has been low in the past.  Metabolic panel significant for BUN of 45 sodium of 130 creatinine 2.1.  Patient's troponin is elevated 0.107.  proBNP significantly elevated at 3900.  D-dimer was elevated 1.43.  Lipase was 122.  Patient is on 4 L nasal cannula at this point in venous blood gas with a pH of 7.34 CO2 4302 12 bicarb 23.  Covid was positive.  I feel more than likely his respiratory status is secondary to mild heart failure but also seems to be Covid.  Any activity causes him to desaturate.  He was given an inhaler.  I feel he does not have ACS and this is due to probable Covid and I feel patient can be admitted to the hospital here.  [facility]  Patient is signed out to Dr. Maciel for awaiting patient bed placement.     I have reviewed the nursing notes.    I have reviewed the findings, diagnosis, plan and need for follow up with the patient.       New Prescriptions    No  medications on file       Final diagnoses:   Infection due to 2019 novel coronavirus   Chronic obstructive pulmonary disease, unspecified COPD type (H)   Chronic systolic congestive heart failure (H)   Type 2 diabetes mellitus without complication, without long-term current use of insulin (H)   Obstructive sleep apnea   Acute kidney injury (H)   Troponin level elevated   Cardiac pacemaker in situ       10/8/2021   [facility] EMERGENCY DEPT     Lonny Whitman MD  10/11/21 1012

## 2021-10-08 NOTE — ED NOTES
"Pt had removed his oxygen and his oximeter probe, gown and hearing aids. Stated he was just \"so board\". O2 sat's were in the low 80s on room air rebounded to 90% on 3L. Decadron and albuterol inhaler administered. Pt stood at bedside to use the urinal and de-sated to low 80's. Pt is unsteady. Voided 30 ml. UA sent. O2 currently at 5L. Sitting at 90%.   "

## 2021-10-08 NOTE — PROGRESS NOTES
Skin Initial Assessment    This writer agrees with admitting RN for this patient and completed a full skin assessment and Barrington score in the Adult PCS flowsheet. Appropriate interventions initiated as needed.       Naomie Neves RN

## 2021-10-08 NOTE — PLAN OF CARE
Patient lethargic. Alert to self and time only.  Thought he was in a [facility].   New IV placed. Saline locked.  Patient SOB. LS diminished, faint crackles left lobe.  Currently 91% on 5L NC.    Jerky movements. Pale, cool, clammy skin.  Patient sleeping currently.  Alarms on.  Patient took NC off, oxygen 88% on RA.  Taking gown off. Impulsive behaviors.  Patient was able to be re-directed.  BP 97/54   Pulse 66   Temp 97.5  F (36.4  C) (Oral)   Resp 20   SpO2 91%   Silvina Flood RN on 10/8/2021 at 2:59 PM

## 2021-10-08 NOTE — PLAN OF CARE
[facility] [place], [place] ADMISSION NOTE    Patient admitted to room 2401 at approximately 1140 via cart from emergency room. Patient was accompanied by transport tech.     Verbal SBAR report received from Dandre prior to patient arrival.     Patient trasferred to bed via Slip sheet Patient alert and oriented X 2. The patient is not having any pain.  . Admission vital signs: Blood pressure 97/54, pulse 66, temperature 97.6  F (36.4  C), temperature source Oral, resp. rate 20, SpO2 92 %. Patient was oriented to plan of care, call light, bed controls, tv, telephone, bathroom, and visiting hours.     Risk Assessment    The following safety risks were identified during admission: fall. Yellow risk band applied: YES.     Skin Initial Assessment    This writer admitted this patient and completed a full skin assessment and Barrington score in the Adult PCS flowsheet. Appropriate interventions initiated as needed.     Secondary skin check completed by Naomie GONZALES RN.         Education    Patient has a [place] to Observation order: No  Observation education completed and documented: N/A      Silvina Flood RN

## 2021-10-08 NOTE — H&P
[facility]    History and Physical - Hospitalist Service       Date of Admission:  10/8/2021    Assessment & Plan      Remington Gutierrez is a 75 year old male admitted on 10/8/2021. [facility] with no recent [facility] available on admission who has noted history of COPD, systolic heart failure, sinus node dysfunction s/p pacemaker, type 2 diabetes, anxiety/depression. He presents with dyspnea and hypoxia and is found to be COVID positive.    # Confirmed COVID-19 infection    # Acute Hypoxic Respiratory Failure secondary to COVID-19 infection  # Viral Pneumonia secondary to COVID-19 infection     Symptom Onset 3-4 days pta    Date of 1st Positive Test 10/8/21   Vaccination Status Partially Vaccinated - 1st dose < 1 week prior to onset of symptoms        - Admit to medical floor with continuous pulse ox, COVID-19 special precautions  - Oxygen: continue current support with nasal cannula at 5 L/min; titrate to keep SpO2 between 90-96%  - Labs: standard COVID admission labs ordered (CBC with diff, CMP, troponin, BNP, CK, INR/PT, PTT, D-dimer, fibrinogen, CRP)   - Imaging: on admission CXR shows multifocal pneumonia and V/Q scan low risk for PE. no additional imaging needed at this time  - Breathing treatments: albuterol and home tiotripium as below; avoid nebulizers in favor of MDIs   - IV fluids: ordered at a rate of 75 mL/hr; hydrate cautiously to avoid worsening respiratory status with volume overload.  - Antibiotics: not indicated   - COVID-Focused Medications: Dexamethasone 6 mg x 10 days or until hospital discharge, started on 10/8/21. Unable to give remdesivir due to GFR < 30 on admission, monitor renal function.  Consider Toci or baracitinib tomorrow if worsening.  Consider remdesivir if renal function improving.     - DVT Prophylaxis: at high risk of thrombotic complications due to COVID-19 (DDimer = 1.43 ug/mL FEU (Ref range: 0.00 - 0.50 ug/mL FEU) ).          - on  PROPHYLACTIC dosing: heparin 5000 units every 8 hours due to renal function        - consider anticoag on discharge for 30 days & until return to normal mobility     Elevated Troponin - suspect due to COVID/strain, not ACS  Initial troponin 0.107. ECG shows paced rhythm, limiting interpretation. Asymptomatic.  Suspect this elevation is related to COVID.  Holding off on tele given low risk with concerns about mental status and removing lines as above.    - repeat troponin in AM, then per COVID order-set.      Acute Kidney Injury on chronic kidney disease - appears pre-renal due to COVID and likely recent poor intake    Admit creatinine 2.10, GFR 29, BUN 45. Baseline creatinine about 1.1. Most likely etiology is pre-renal due to current illness based on BUN:Cr ratio.   - was given 500 ml bolus in ER.  Need to be cautious with fluids given CHF with unknown details as below.  Continue on LR at 75 ml/hr, reassess 10/9.    - Monitor I/O's, daily weights  - hold lisinopril, lasix and mobic until creatinine improves    Possible acute encephalopathy due to infection as above with underlying mild cognitive impairment/dementia  Noted to have some impairment on chart review. Baseline unclear, but sounds like he's had some fluctuant symptoms in the past.  Is no Aricept at home which we'll continue for now.  Sounds like he was more acutely confused in the ER, pulling out IV's x 2 and removing his oxygen.  However, at time of admission H+P he was improved and appeared likely at or near baseline.      COPD without clear exacerbation    Noted on chart review. PFTs unavailable. Not on home oxygen. Continued home tiotropium and albuterol, but will schedule albuterol 4 times daily with additional every 2 hours prn for now.  On dexamethasone as above, not clearly in an exacerbation but will follow.       Chronic Systolic Congestive Heart Failure   Dilated Cardiomyopathy  Essential hypertension   Noted to have history of systolic CHF and  dilated cardiomyopathy. No echo on file, awaiting [facility]. Echo ordered but unable to do until Monday.  Specifically requested most [facility] if able.    - held home lasix on admission given pre-renal TANIA as above, follow respiratory status and weight/I+Os and reassess daily.     - held home lisinopril on admission due to TANIA.    - gently rehydrating as above.    - Follow daily weights and I/Os.  Continue home metoprolol with holding parameters.      Sinus node dysfunction s/p pacemaker  Paced rhythm noted on ECG. Per chart review history of sinus node dysfunction, awaiting further records.   - [facility]     Diabetes Mellitus, Type II  Noted on chart review. a1C pending.   -hold home jardiance, glipizide and metformin     -moderate sliding scale insulin for now.    -hypo/hyperglycemia protocol with blood glucose monitoring    Anxiety and Depression  Insomnia  Previously diagnosed.  Appeared stable at time of admission H+P.    - continue home lamictal, paxil and xanax twice daily prn      BENNY  Reports using CPAP at home, just using oxygen here for now.      Rosacea  Continue home doxycycline daily.      Insomnia  Continue home ambien, but will decrease dosage to 6.25 mg from prior to admission 12.5 mg at least while here, confirm long-term plan with primary care provider.           DVT Prophylaxis: Enoxaparin (Lovenox) SQ  Rose Catheter: Not present  Central Lines: None  Code Status:   full code, discussed on admission and patient at which point patient appeared to have adequate insight to understand and determine this.      Disposition Plan   Anticipate at least 2-3 days inpatient.  Consider ordering physical therapy/OT Sat or sun.     ______________________________________________________________________    Chief Complaint   Dyspnea     History is obtained from the patient    History of Present Illness   Remington Gutierrez is a 75 year old male who 76yo with history of  "COPD, CHF, diabetes, BENNY, pacemaker and dementia who presents with dyspnea.  Has had about 3-4 days of cough, dyspnea and malaise.  No clear-cut fever.  Patient not able to give detailed report of symptoms beyond this.  Currently feels \"pretty good\" on 5LNC.  No dyspnea on oxygen, no distress.  sats running mid-90's.  No pain at present.  Afebrile.  No other new concerns.  Patient denies any recent medication changes.    Did get his COVID vaccine #1 about 5 days prior to onset of symptoms.       No tobacco, alcohol or illicit drug use.      Review of Systems    The 10 point Review of Systems is negative other than noted in the HPI or here.     Past Medical History    I have reviewed this patient's medical history and updated it with pertinent information if needed.   Past Medical History:   Diagnosis Date     BACKACHE NOS 2007    Injured back bending over to  potted plant -  Lidoderm patch helpful.   Xray of thoracic and lumbar spine shows arthritic change and mild scoliosis     Past Surgical History   I have reviewed this patient's surgical history and updated it with pertinent information if needed.  Past Surgical History:   Procedure Laterality Date     COLONOSCOPY  03     ORTHOPEDIC SURGERY       SURGICAL HISTORY OF -       Arthroscopy (L) Knee      SURGICAL HISTORY OF -       left TKT     SURGICAL HISTORY OF -   10/08    Right THR     Social History   I have reviewed this patient's social history and updated it with pertinent information if needed.  Social History     Tobacco Use     Smoking status: Former Smoker     Packs/day: 2.00     Years: 25.00     Pack years: 50.00     Types: Cigarettes     Quit date: 2005     Years since quittin.6     Smokeless tobacco: Never Used   Substance Use Topics     Alcohol use: Yes     Comment: very little     Drug use: No     Family History   I have reviewed this patient's family history and updated it with pertinent information if " needed.  Family History   Problem Relation Age of Onset     Respiratory Mother         copd     Heart Disease Mother      Cardiovascular Mother 75        MI     Coronary Artery Disease Mother      Cancer Father         kidney     Arthritis Brother      Cardiovascular Brother      Chronic Obstructive Pulmonary Disease Brother      Rheumatoid Arthritis Brother      Hyperlipidemia Brother      Allergies Sister      Depression Sister      Coronary Artery Disease Maternal Grandmother      Cerebrovascular Disease Brother      Diabetes Brother      Hypertension Brother      Hyperlipidemia Brother      Prostate Cancer Brother      Cerebrovascular Disease Brother      Hyperlipidemia Brother      Depression Brother      Family History Negative No family hx of      Prior to Admission Medications   Prior to Admission Medications   Prescriptions Last Dose Informant Patient Reported? Taking?   ALPRAZOLAM PO  Daughter Yes No   Sig: Take 0.25 mg by mouth 2 times daily as needed for anxiety   DONEPEZIL HCL PO  Daughter Yes No   Sig: Take 10 mg by mouth daily   DOXYCYCLINE HYCLATE PO  Daughter Yes No   Sig: Take 50 mg by mouth daily   LAMOTRIGINE PO  Daughter Yes No   Sig: Take 150 mg by mouth 2 times daily   MELOXICAM PO  Daughter Yes No   Sig: Take 15 mg by mouth daily   PAROXETINE HCL PO  Daughter Yes No   Sig: Take 40 mg by mouth daily   SIMVASTATIN PO  Daughter Yes No   Sig: Take 10 mg by mouth At Bedtime   betamethasone valerate 0.1 % EX external ointment   No No   Sig: Apply to left hand as needed for skin rash twice daily.   lisinopril (PRINIVIL/ZESTRIL) 10 MG tablet  Daughter No No   Sig: Take 1 tablet (10 mg) by mouth daily   meclizine (ANTIVERT) 12.5 MG tablet   No No   Sig: Take 1 tablet (12.5 mg) by mouth 4 times daily as needed for dizziness   metFORMIN (GLUCOPHAGE) 500 MG tablet  Daughter No No   Si tabs with breakfast and 1 tablet evening meal.   zolpidem (AMBIEN CR) 12.5 MG CR tablet  Daughter No No   Sig: Take 1  tablet (12.5 mg) by mouth nightly as needed for sleep      Facility-Administered Medications: None     Allergies   Allergies   Allergen Reactions     Neomycin Rash     Physical Exam   Vital Signs: Temp: 97.6  F (36.4  C) Temp src: Oral BP: 97/54 Pulse: 66   Resp: 20 SpO2: 91 % O2 Device: Nasal cannula Oxygen Delivery: 5 LPM  Weight: 0 lbs 0 oz    EXAM:  General: awake and alert, NAD, oriented x 3  Head: normocephalic  Neck: unremarkable, no lymphadenopathy   HEENT: oropharynx pink and moist    Heart: Regular rate and rhythm, no murmurs, rubs, or gallops  Lungs: perhaps faintly decreased air movement but pretty good overall, no clear wheezing even with forced exhalation, no crackles.  No distress on 5LNC.    Abdomen: soft, non-tender, no masses or organomegaly  Extremities: no edema in lower extremities, no calf pain or tenderness  Skin unremarkable.       Data   Data reviewed today: I reviewed all medications, new labs and imaging results over the last 24 hours. I personally reviewed labs, ECG and imaging.    Recent Labs   Lab 10/08/21  0442   WBC 4.4   HGB 14.9   MCV 96   PLT 94*   *   POTASSIUM 5.2   CHLORIDE 97   CO2 20   BUN 45*   CR 2.10*   ANIONGAP 13   EHSAN 9.1   *   ALBUMIN 3.8   PROTTOTAL 7.9   BILITOTAL 0.8   ALKPHOS 87   ALT 45   *   LIPASE 122   TROPONIN 0.107*     Recent Results (from the past 24 hour(s))   XR Chest Port 1 View    Narrative    CHEST ONE VIEW PORTABLE   10/8/2021 9:17 AM     HISTORY: Shortness of breath    COMPARISON: 7/23/2021      Impression    IMPRESSION: Left-sided pacer/defibrillator device. Heart enlargement  grossly similar to prior. There are new bilateral interstitial and  groundglass opacities which could be related to pulmonary edema or  atypical infection.    LOYD GIBSON MD         SYSTEM ID:  [id number]   Lung vent and perf (NM)    Narrative    NUCLEAR MEDICINE LUNG VENTILATION AND PERFUSION 10/8/2021 9:44 AM     HISTORY: PE suspected, low/intermediate  probability, positive D-dimer.  COVID. Renal insufficiency.    COMPARISON: None.    TECHNIQUE: Tc-99m MAA injected intravenously for evaluation of  pulmonary perfusion.  Tc-99m DTPA inhaled for the ventilation phase.    DOSE: 63 mCi technetium DTPA, 5.3 mCi technetium MAA.    FINDINGS: There are patchy ventilatory and perfusion defects in both  lungs that are matched. Areas of defect appear to correspond to  abnormalities on chest x-ray.      Impression    IMPRESSION: Low probability for pulmonary embolism.    LOYD GIBSON MD         SYSTEM ID:  [id number]

## 2021-10-09 PROBLEM — N18.30 CKD (CHRONIC KIDNEY DISEASE) STAGE 3, GFR 30-59 ML/MIN (H): Chronic | Status: ACTIVE | Noted: 2021-10-09

## 2021-10-09 PROBLEM — J44.1 COPD EXACERBATION (H): Status: RESOLVED | Noted: 2020-01-13 | Resolved: 2021-10-09

## 2021-10-09 PROBLEM — I50.22 CHRONIC SYSTOLIC CONGESTIVE HEART FAILURE (H): Chronic | Status: ACTIVE | Noted: 2021-03-02

## 2021-10-09 PROBLEM — I10 BENIGN ESSENTIAL HYPERTENSION: Chronic | Status: ACTIVE | Noted: 2021-10-08

## 2021-10-09 PROBLEM — Z95.0 CARDIAC PACEMAKER IN SITU: Chronic | Status: ACTIVE | Noted: 2021-10-08

## 2021-10-09 PROBLEM — H81.10 BPPV (BENIGN PAROXYSMAL POSITIONAL VERTIGO): Status: ACTIVE | Noted: 2021-10-09

## 2021-10-09 PROBLEM — J96.01 ACUTE RESPIRATORY FAILURE WITH HYPOXIA (H): Status: ACTIVE | Noted: 2021-10-09

## 2021-10-09 PROBLEM — F41.0 PANIC DISORDER WITHOUT AGORAPHOBIA: Chronic | Status: ACTIVE | Noted: 2021-10-08

## 2021-10-09 PROBLEM — Z95.810 AUTOMATIC IMPLANTABLE CARDIOVERTER-DEFIBRILLATOR IN SITU: Status: ACTIVE | Noted: 2021-10-08

## 2021-10-09 PROBLEM — L71.9 ROSACEA: Chronic | Status: ACTIVE | Noted: 2021-10-09

## 2021-10-09 PROBLEM — F09 MILD COGNITIVE DISORDER: Chronic | Status: ACTIVE | Noted: 2021-10-08

## 2021-10-09 PROBLEM — E11.9 TYPE 2 DIABETES MELLITUS WITHOUT COMPLICATION, WITHOUT LONG-TERM CURRENT USE OF INSULIN (H): Chronic | Status: ACTIVE | Noted: 2017-03-22

## 2021-10-09 PROBLEM — F33.40 RECURRENT MAJOR DEPRESSIVE DISORDER, IN REMISSION (H): Chronic | Status: ACTIVE | Noted: 2021-10-08

## 2021-10-09 PROBLEM — G93.41 METABOLIC ENCEPHALOPATHY: Status: ACTIVE | Noted: 2021-10-08

## 2021-10-09 PROBLEM — J44.9 CHRONIC OBSTRUCTIVE PULMONARY DISEASE, UNSPECIFIED COPD TYPE (H): Chronic | Status: ACTIVE | Noted: 2021-10-08

## 2021-10-09 PROBLEM — I42.0 DILATED CARDIOMYOPATHY (H): Chronic | Status: ACTIVE | Noted: 2021-10-08

## 2021-10-09 PROBLEM — Z95.810 AUTOMATIC IMPLANTABLE CARDIOVERTER-DEFIBRILLATOR IN SITU: Chronic | Status: ACTIVE | Noted: 2021-10-08

## 2021-10-09 LAB
ALBUMIN SERPL-MCNC: 3.1 G/DL (ref 3.4–5)
ALP SERPL-CCNC: 93 U/L (ref 40–150)
ALT SERPL W P-5'-P-CCNC: 39 U/L (ref 0–70)
ANION GAP SERPL CALCULATED.3IONS-SCNC: 8 MMOL/L (ref 3–14)
AST SERPL W P-5'-P-CCNC: 75 U/L (ref 0–45)
BILIRUB SERPL-MCNC: 0.4 MG/DL (ref 0.2–1.3)
BUN SERPL-MCNC: 57 MG/DL (ref 7–30)
CALCIUM SERPL-MCNC: 8.5 MG/DL (ref 8.5–10.1)
CHLORIDE BLD-SCNC: 106 MMOL/L (ref 94–109)
CO2 SERPL-SCNC: 20 MMOL/L (ref 20–32)
CREAT SERPL-MCNC: 1.9 MG/DL (ref 0.66–1.25)
CRP SERPL-MCNC: 100 MG/L (ref 0–8)
D DIMER PPP FEU-MCNC: 1.17 UG/ML FEU (ref 0–0.5)
ERYTHROCYTE [DISTWIDTH] IN BLOOD BY AUTOMATED COUNT: 12.5 % (ref 10–15)
FIBRINOGEN PPP-MCNC: 677 MG/DL (ref 170–490)
GFR SERPL CREATININE-BSD FRML MDRD: 33 ML/MIN/1.73M2
GLUCOSE BLD-MCNC: 238 MG/DL (ref 70–99)
GLUCOSE BLDC GLUCOMTR-MCNC: 219 MG/DL (ref 70–99)
GLUCOSE BLDC GLUCOMTR-MCNC: 222 MG/DL (ref 70–99)
GLUCOSE BLDC GLUCOMTR-MCNC: 229 MG/DL (ref 70–99)
GLUCOSE BLDC GLUCOMTR-MCNC: 265 MG/DL (ref 70–99)
GLUCOSE BLDC GLUCOMTR-MCNC: 316 MG/DL (ref 70–99)
HCT VFR BLD AUTO: 46.4 % (ref 40–53)
HGB BLD-MCNC: 16 G/DL (ref 13.3–17.7)
MCH RBC QN AUTO: 32.6 PG (ref 26.5–33)
MCHC RBC AUTO-ENTMCNC: 34.5 G/DL (ref 31.5–36.5)
MCV RBC AUTO: 95 FL (ref 78–100)
PLATELET # BLD AUTO: 117 10E3/UL (ref 150–450)
POTASSIUM BLD-SCNC: 4.1 MMOL/L (ref 3.4–5.3)
PROT SERPL-MCNC: 7.3 G/DL (ref 6.8–8.8)
RBC # BLD AUTO: 4.91 10E6/UL (ref 4.4–5.9)
SODIUM SERPL-SCNC: 134 MMOL/L (ref 133–144)
TROPONIN I SERPL-MCNC: 0.02 UG/L (ref 0–0.04)
WBC # BLD AUTO: 5.7 10E3/UL (ref 4–11)

## 2021-10-09 PROCEDURE — 86140 C-REACTIVE PROTEIN: CPT | Performed by: FAMILY MEDICINE

## 2021-10-09 PROCEDURE — 250N000011 HC RX IP 250 OP 636: Performed by: INTERNAL MEDICINE

## 2021-10-09 PROCEDURE — 250N000013 HC RX MED GY IP 250 OP 250 PS 637: Performed by: INTERNAL MEDICINE

## 2021-10-09 PROCEDURE — 99233 SBSQ HOSP IP/OBS HIGH 50: CPT | Performed by: INTERNAL MEDICINE

## 2021-10-09 PROCEDURE — 999N000157 HC STATISTIC RCP TIME EA 10 MIN

## 2021-10-09 PROCEDURE — 250N000012 HC RX MED GY IP 250 OP 636 PS 637: Performed by: INTERNAL MEDICINE

## 2021-10-09 PROCEDURE — 85027 COMPLETE CBC AUTOMATED: CPT | Performed by: FAMILY MEDICINE

## 2021-10-09 PROCEDURE — 120N000001 HC R&B MED SURG/OB

## 2021-10-09 PROCEDURE — 272N000054 HC CANNULA HIGH FLOW, ADULT

## 2021-10-09 PROCEDURE — 250N000012 HC RX MED GY IP 250 OP 636 PS 637: Performed by: FAMILY MEDICINE

## 2021-10-09 PROCEDURE — 84484 ASSAY OF TROPONIN QUANT: CPT | Performed by: FAMILY MEDICINE

## 2021-10-09 PROCEDURE — 85379 FIBRIN DEGRADATION QUANT: CPT | Performed by: FAMILY MEDICINE

## 2021-10-09 PROCEDURE — 250N000013 HC RX MED GY IP 250 OP 250 PS 637: Performed by: FAMILY MEDICINE

## 2021-10-09 PROCEDURE — 80053 COMPREHEN METABOLIC PANEL: CPT | Performed by: FAMILY MEDICINE

## 2021-10-09 PROCEDURE — 250N000011 HC RX IP 250 OP 636: Performed by: FAMILY MEDICINE

## 2021-10-09 PROCEDURE — 999N000215 HC STATISTIC HFNC ADULT NON-CPAP

## 2021-10-09 PROCEDURE — 36415 COLL VENOUS BLD VENIPUNCTURE: CPT | Performed by: FAMILY MEDICINE

## 2021-10-09 PROCEDURE — 85384 FIBRINOGEN ACTIVITY: CPT | Performed by: FAMILY MEDICINE

## 2021-10-09 RX ORDER — PHYTONADIONE 5 MG/1
2 TABLET ORAL DAILY
Status: ON HOLD | COMMUNITY
Start: 2021-07-30 | End: 2021-10-25

## 2021-10-09 RX ORDER — GLUCOSAMINE HCL/CHONDROITIN SU 500-400 MG
CAPSULE ORAL
COMMUNITY

## 2021-10-09 RX ORDER — BRIMONIDINE TARTRATE 2 MG/ML
1 SOLUTION/ DROPS OPHTHALMIC 2 TIMES DAILY
COMMUNITY

## 2021-10-09 RX ORDER — FUROSEMIDE 10 MG/ML
20 INJECTION INTRAMUSCULAR; INTRAVENOUS 2 TIMES DAILY
Status: DISCONTINUED | OUTPATIENT
Start: 2021-10-09 | End: 2021-10-10

## 2021-10-09 RX ORDER — LISINOPRIL 5 MG/1
2.5 TABLET ORAL DAILY
Status: ON HOLD | COMMUNITY
End: 2021-10-25

## 2021-10-09 RX ORDER — MECLIZINE HCL 25MG 25 MG/1
50 TABLET, CHEWABLE ORAL EVERY 12 HOURS PRN
COMMUNITY

## 2021-10-09 RX ADMIN — HEPARIN SODIUM 5000 UNITS: 10000 INJECTION, SOLUTION INTRAVENOUS; SUBCUTANEOUS at 00:12

## 2021-10-09 RX ADMIN — ALBUTEROL SULFATE 2 PUFF: 90 AEROSOL, METERED RESPIRATORY (INHALATION) at 17:27

## 2021-10-09 RX ADMIN — DEXAMETHASONE 6 MG: 2 TABLET ORAL at 12:04

## 2021-10-09 RX ADMIN — ALBUTEROL SULFATE 2 PUFF: 90 AEROSOL, METERED RESPIRATORY (INHALATION) at 22:00

## 2021-10-09 RX ADMIN — METOPROLOL SUCCINATE 25 MG: 25 TABLET, FILM COATED, EXTENDED RELEASE ORAL at 09:09

## 2021-10-09 RX ADMIN — PAROXETINE HYDROCHLORIDE 40 MG: 20 TABLET, FILM COATED ORAL at 22:02

## 2021-10-09 RX ADMIN — ALBUTEROL SULFATE 2 PUFF: 90 AEROSOL, METERED RESPIRATORY (INHALATION) at 09:09

## 2021-10-09 RX ADMIN — SIMVASTATIN 20 MG: 20 TABLET, FILM COATED ORAL at 22:02

## 2021-10-09 RX ADMIN — HEPARIN SODIUM 5000 UNITS: 10000 INJECTION, SOLUTION INTRAVENOUS; SUBCUTANEOUS at 16:00

## 2021-10-09 RX ADMIN — LAMOTRIGINE 150 MG: 100 TABLET ORAL at 09:10

## 2021-10-09 RX ADMIN — FUROSEMIDE 20 MG: 10 INJECTION, SOLUTION INTRAMUSCULAR; INTRAVENOUS at 13:46

## 2021-10-09 RX ADMIN — DOXYCYCLINE HYCLATE 50 MG: 50 CAPSULE ORAL at 09:09

## 2021-10-09 RX ADMIN — ALBUTEROL SULFATE 2 PUFF: 90 INHALANT RESPIRATORY (INHALATION) at 15:35

## 2021-10-09 RX ADMIN — UMECLIDINIUM 1 PUFF: 62.5 AEROSOL, POWDER ORAL at 09:09

## 2021-10-09 RX ADMIN — INSULIN ASPART 3 UNITS: 100 INJECTION, SOLUTION INTRAVENOUS; SUBCUTANEOUS at 12:05

## 2021-10-09 RX ADMIN — INSULIN ASPART 2 UNITS: 100 INJECTION, SOLUTION INTRAVENOUS; SUBCUTANEOUS at 17:29

## 2021-10-09 RX ADMIN — LAMOTRIGINE 150 MG: 100 TABLET ORAL at 20:38

## 2021-10-09 RX ADMIN — ALBUTEROL SULFATE 2 PUFF: 90 AEROSOL, METERED RESPIRATORY (INHALATION) at 12:05

## 2021-10-09 RX ADMIN — INSULIN GLARGINE 15 UNITS: 100 INJECTION, SOLUTION SUBCUTANEOUS at 12:05

## 2021-10-09 RX ADMIN — HEPARIN SODIUM 5000 UNITS: 10000 INJECTION, SOLUTION INTRAVENOUS; SUBCUTANEOUS at 09:10

## 2021-10-09 RX ADMIN — FUROSEMIDE 20 MG: 10 INJECTION, SOLUTION INTRAMUSCULAR; INTRAVENOUS at 20:37

## 2021-10-09 ASSESSMENT — ACTIVITIES OF DAILY LIVING (ADL)
ADLS_ACUITY_SCORE: 10
ADLS_ACUITY_SCORE: 10
ADLS_ACUITY_SCORE: 12
ADLS_ACUITY_SCORE: 10

## 2021-10-09 NOTE — PLAN OF CARE
Patient moved to 2402 room with window to see patient since door needs to be closed with diagnosis.  Patient alert, oriented, not pulling on IVsite, tubing, or NC in nose.    Patient tolerating Heparin injections in abdomen.  Supper BS = 220 patient received 2 units Novolog Sliding Scale  BEDTIME BS = 285 patient received 2 units Novolog Sliding Scale  0200 BS = 229 patient not covered at this time    Oxygen increased to 6.5 L NC to maintain saturation 89-92%    Patient remains clammy, diaphoretic, changing bed x 2 thus far, patient remains afebrile writer's shift    Echocardiogram on order

## 2021-10-09 NOTE — PROGRESS NOTES
[facility]    Medicine Progress Note - Hospitalist Service       Date of Admission:  10/8/2021    Assessment & Plan              Remington Gutierrez is a 75 year old male admitted on 10/8/2021. [facility] with history of COPD, systolic heart failure, s/p AICD, type 2 diabetes, Anxiety/depression. He presents with increasing shortness of breath for 3 or 4 days, especially with activity and lying down, nonproductive cough. Found to be COVID positive.    # Confirmed COVID-19 infection    # Acute Hypoxic Respiratory Failure secondary to COVID-19 infection  # Viral Pneumonia secondary to COVID-19 infection     Symptom Onset 10/4/21   Date of 1st Positive Test 10/8/21   Vaccination Status Partially Vaccinated - 1st dose < 1 week prior to onset of symptoms            - Oxygen: titrate to keep SpO2 between 90-96%  - Labs: standard COVID admission labs ordered   - Imaging:   On admission CXR - Heart enlargement grossly similar to prior. There are new bilateral interstitial and groundglass opacities which could be related to pulmonary edema or atypical infection (favor infection). V/Q scan on admission low risk for PE.   - Breathing treatments:   albuterol and home tiotripium as below; avoid nebulizers in favor of MDIs   - IV fluids: minimize  - Antibiotics: not indicated   - COVID-Focused Medications:   Dexamethasone 6 mg  X 10 days started on 10/8/21.   Unable to give remdesivir due to GFR < 30 on admission. Consider remdesivir if renal function improving.    Consider Toci or baracitinib tomorrow if worsening.   - DVT Prophylaxis:   heparin 5000 units every 8 hours due to renal function  consider anticoag on discharge for 30 days & until return to normal mobility      - 100    D-Dimer 1.43 - 1.17    BNP 3907    O2 needs slight increased from 5 to 7 LPM  - Continue current treatments.   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 On admission appears pre-renal due to COVID and likely recent poor intake. Admit creatinine 2.10, GFR 29, BUN 45. Given 500 ml bolus in ER     Need to be cautious with fluids given CHF with unknown details as below.      Creatinine slightly improved 2.1- 1.9  - Monitor I/O's, daily weights  - Hold lisinopril 5, lasix 20 BID and mobic until creatinine improves    Possible acute encephalopathy due to infection/hypoxia as above   Possible mild cognitive impairment/dementia  Noted to have some impairment on chart review. Baseline unclear, but sounds like he's had some fluctuant symptoms in the past. Sounds like he was more acutely confused in the ER, pulling out IV's x 2 and removing his oxygen.  However, at time of admission H+P he was improved and appeared likely at or near baseline.    Improved  -  follow     Chronic Systolic Congestive Heart Failure   Dilated Cardiomyopathy  Essential hypertension   Noted to have history of systolic CHF and dilated cardiomyopathy. [facility]imal [facility]. Non-ishemic cardiomyopathy. ECHO 4/2021 - EF 25-30%. Severe LV dilation. Global hypokinesis. RVSF mildly reduced. Cardiac MRI 2/2021- c/w Non-ishemic cardiomyopathy. Ascending aorta 4.1 cm. Admitted with acute CHF [facility] 9/7-12/2021. Dry weight 202# per [facility] discharge summary 9/2021    On admission BNP elevated 3,907 (similar to 7/2021 ED visit for chf/copd), CXR on admission consistent with congestive heart failure or covid (favor infection). Per patiemt daily weights were stable 198-200# prior to admission .   - Echo ordered but unable to do until Monday.  - Held home lisinopril 5, home lasix 20 BID on admission given pre-renal TANIA as above  - Continue home metoprolol XL 25 with holding parameters.    - Follow daily weights and I/Os.  None done so far. Baseline weight  [facility]     COPD    Noted on chart review. PFTs unavailable. Not on home oxygen. Not clearly in an exacerbation   - Continued home tiotropium and albuterol, but will schedule albuterol 4 times daily with additional every 2 hours prn for now.  On dexamethasone as above,      Elevated Troponin - suspect due to COVID/strain, not ACS  Initial troponin 0.107. ECG shows paced rhythm, limiting interpretation. Asymptomatic.  Suspect this elevation is related to COVID.  Holding off on tele given low risk with concerns about mental status and removing lines as above.    - repeat troponin 0.023    Diabetes Mellitus, Type 2  A1c 7.2 on 10/5/2021    BS are high   - Holding home jardiance 25, glipizide 5 and metformin  1000BID   - Start lantus 15  - Moderate sliding scale insulin for now.      BENNY  Mild, but severe supine by sleep study 2008. Reports using CPAP at home,   - Using oxygen here for now.    - Avoid supine position while off CPAP    Anxiety and Depression  Insomnia  Previously diagnosed.  Appeared stable at time of admission H+P.    - continue home lamictal 150, paxil 40   - continue home ambien, but will decrease dosage to 6.25 mg from prior to admission 12.5 mg at least while here, confirm long-term plan with primary care provider.      Sinus node dysfunction   S/p AICD 5/4/21  Paced rhythm noted on ECG.    Rosacea  Continue home doxycycline daily.         Diet: Consistent Carb 60 grams CHO per Meal Diet    DVT Prophylaxis: Heparin subcutaneous 5000 q8  Rose Catheter: Not present  Central Lines: None  Code Status:   Full    Disposition Plan   Expected discharge:  > 2 days recommended to prior living arrangement once O2 use less than 3 liters/minute.     The patient's care was discussed with the Bedside Nurse and Patient.    Franklin Wei MD  Hospitalist Service  [facility]  Securely message with the Vocera Web Console (learn more here)  Text page via [employer]      Clinically  Significant Risk Factors Present on Admission               ______________________________________________________________________    Interval History   Feels better  No confusion per RN      There were no vitals filed for this visit.      Data reviewed today: I reviewed all medications, new labs and imaging results over the last 24 hours. I personally reviewed the chest x-ray image(s) showing patchy infiltrates.    Physical Exam   Vital Signs: Temp: 97.3  F (36.3  C) Temp src: Oral BP: 109/62 Pulse: 75   Resp: 16 SpO2: 92 % O2 Device: Nasal cannula Oxygen Delivery: 7 LPM  Weight: 0 lbs 0 oz  Constitutional: awake, alert, cooperative, no apparent distress, and appears stated age  Respiratory: No increased work of breathing, good air exchange, clear to auscultation bilaterally, no crackles or wheezing    Data     Recent Labs   Lab 10/09/21  0752 10/09/21  0521 10/09/21  0230 10/08/21  2131 10/08/21  1614 10/08/21  0442   * 238* 229* 285* 220* 125*      Lab Results   Component Value Date    TROPI <0.015 06/17/2018    TROPI <0.015 06/17/2018    TROPI 0.019 03/20/2013    TROPONIN 0.023 10/09/2021    TROPONIN 0.107 (H) 10/08/2021    TROPONIN <0.015 07/23/2021     Heart Failure Labs  Outpatient: No results found for: NTBNP    Inpatient:   Lab Results   Component Value Date    NTBNPI 3,907 (H) 10/08/2021    NTBNPI 3,075 (H) 07/23/2021       CMPRecent Labs   Lab 10/09/21  0752 10/09/21  0521 10/09/21  0230 10/08/21  2131 10/08/21  1614 10/08/21  0442   NA  --  134  --   --   --  130*  130*   POTASSIUM  --  4.1  --   --   --  5.2   CHLORIDE  --  106  --   --   --  97   CO2  --  20  --   --   --  20   ANIONGAP  --  8  --   --   --  13   * 238* 229* 285*   < > 125*   BUN  --  57*  --   --   --  45*   CR  --  1.90*  --   --   --  2.10*  2.10*   GFRESTIMATED  --  33*  --   --   --  29*   EHSAN  --  8.5  --   --   --  9.1   PROTTOTAL  --  7.3  --   --   --  7.9   ALBUMIN  --  3.1*  --   --   --  3.8   BILITOTAL  --   0.4  --   --   --  0.8   ALKPHOS  --  93  --   --   --  87   AST  --  75*  --   --   --  108*   ALT  --  39  --   --   --  45    < > = values in this interval not displayed.     CBC  Recent Labs   Lab 10/09/21  0521 10/08/21  0442   WBC 5.7 4.4   RBC 4.91 4.55   HGB 16.0 14.9   HCT 46.4 43.6   MCV 95 96   MCH 32.6 32.7   MCHC 34.5 34.2   RDW 12.5 12.4   * 94*     INR  Recent Labs   Lab 10/08/21  1400   INR 1.13       Venous Blood Gas  Recent Labs   Lab 10/08/21  0442   PHV 7.34   PCO2V 43   PO2V 12*   HCO3V 23   MANJINDER -3.0   O2PER 30     Lab Results   Component Value Date    A1C 7.2 10/08/2021    A1C 6.6 05/07/2019

## 2021-10-09 NOTE — PLAN OF CARE
Pt requiring 15 Oxymask to maintain SPO2 >90%. MD Aware. IV lasix ordered and given per MAR. Intermittently SOB with movement. SPO2 would be mid-70s after ambulating. Hypotensive throughout the shift, but asymptomatic. Otherwise vitally stable.    /63 (BP Location: Right arm)   Pulse 68   Temp 97.3  F (36.3  C) (Oral)   Resp 22   SpO2 91%     No pain or nausea reported. X1/GB/Walker, patient has chronic tremors that are worse with decadron and albuterol PRN. MD aware. 1 BM today. Blood sugars have been 200-275.    Plan: O2 management and diureses.     Naomie Neves RN on 10/9/2021 at 2:29 PM      Problem: Adult Inpatient Plan of Care  Goal: Plan of Care Review  Outcome: No Change  Goal: Patient-Specific Goal (Individualized)  Outcome: No Change  Goal: Absence of Hospital-Acquired Illness or Injury  Outcome: No Change  Intervention: Identify and Manage Fall Risk  Recent Flowsheet Documentation  Taken 10/9/2021 0900 by Naomie Neves RN  Safety Promotion/Fall Prevention: activity supervised  Intervention: Prevent Skin Injury  Recent Flowsheet Documentation  Taken 10/9/2021 0900 by Naomie Neves RN  Body Position: position changed independently  Intervention: Prevent and Manage VTE (Venous Thromboembolism) Risk  Recent Flowsheet Documentation  Taken 10/9/2021 0900 by Naomie Neves RN  VTE Prevention/Management:   ambulation promoted   fluids promoted  Goal: Optimal Comfort and Wellbeing  Outcome: No Change  Goal: Readiness for Transition of Care  Outcome: No Change     Problem: Risk for Delirium  Goal: Optimal Coping  Outcome: No Change  Goal: Improved Behavioral Control  Outcome: No Change  Goal: Improved Attention and Thought Clarity  Outcome: No Change  Goal: Improved Sleep  Outcome: No Change

## 2021-10-10 PROBLEM — J12.82 PNEUMONIA DUE TO 2019 NOVEL CORONAVIRUS: Status: ACTIVE | Noted: 2021-10-08

## 2021-10-10 LAB
ANION GAP SERPL CALCULATED.3IONS-SCNC: 13 MMOL/L (ref 3–14)
BUN SERPL-MCNC: 68 MG/DL (ref 7–30)
CALCIUM SERPL-MCNC: 8.9 MG/DL (ref 8.5–10.1)
CHLORIDE BLD-SCNC: 101 MMOL/L (ref 94–109)
CO2 SERPL-SCNC: 20 MMOL/L (ref 20–32)
CREAT SERPL-MCNC: 1.69 MG/DL (ref 0.66–1.25)
CRP SERPL-MCNC: 50.5 MG/L (ref 0–8)
D DIMER PPP FEU-MCNC: 0.67 UG/ML FEU (ref 0–0.5)
ERYTHROCYTE [DISTWIDTH] IN BLOOD BY AUTOMATED COUNT: 12.6 % (ref 10–15)
FIBRINOGEN PPP-MCNC: 668 MG/DL (ref 170–490)
GFR SERPL CREATININE-BSD FRML MDRD: 38 ML/MIN/1.73M2
GLUCOSE BLD-MCNC: 232 MG/DL (ref 70–99)
GLUCOSE BLDC GLUCOMTR-MCNC: 218 MG/DL (ref 70–99)
GLUCOSE BLDC GLUCOMTR-MCNC: 228 MG/DL (ref 70–99)
GLUCOSE BLDC GLUCOMTR-MCNC: 264 MG/DL (ref 70–99)
GLUCOSE BLDC GLUCOMTR-MCNC: 265 MG/DL (ref 70–99)
GLUCOSE BLDC GLUCOMTR-MCNC: 273 MG/DL (ref 70–99)
HCT VFR BLD AUTO: 43.2 % (ref 40–53)
HGB BLD-MCNC: 15.1 G/DL (ref 13.3–17.7)
LDH SERPL L TO P-CCNC: 738 U/L (ref 85–227)
MCH RBC QN AUTO: 32.5 PG (ref 26.5–33)
MCHC RBC AUTO-ENTMCNC: 35 G/DL (ref 31.5–36.5)
MCV RBC AUTO: 93 FL (ref 78–100)
PLATELET # BLD AUTO: 164 10E3/UL (ref 150–450)
POTASSIUM BLD-SCNC: 4.2 MMOL/L (ref 3.4–5.3)
PROCALCITONIN SERPL-MCNC: 0.64 NG/ML
RBC # BLD AUTO: 4.65 10E6/UL (ref 4.4–5.9)
SODIUM SERPL-SCNC: 134 MMOL/L (ref 133–144)
UFH PPP CHRO-ACNC: 0.8 IU/ML
WBC # BLD AUTO: 8 10E3/UL (ref 4–11)

## 2021-10-10 PROCEDURE — 99233 SBSQ HOSP IP/OBS HIGH 50: CPT | Performed by: INTERNAL MEDICINE

## 2021-10-10 PROCEDURE — 999N000215 HC STATISTIC HFNC ADULT NON-CPAP

## 2021-10-10 PROCEDURE — 250N000012 HC RX MED GY IP 250 OP 636 PS 637: Performed by: FAMILY MEDICINE

## 2021-10-10 PROCEDURE — 85384 FIBRINOGEN ACTIVITY: CPT | Performed by: FAMILY MEDICINE

## 2021-10-10 PROCEDURE — 250N000013 HC RX MED GY IP 250 OP 250 PS 637: Performed by: INTERNAL MEDICINE

## 2021-10-10 PROCEDURE — 80048 BASIC METABOLIC PNL TOTAL CA: CPT | Performed by: FAMILY MEDICINE

## 2021-10-10 PROCEDURE — XW033E5 INTRODUCTION OF REMDESIVIR ANTI-INFECTIVE INTO PERIPHERAL VEIN, PERCUTANEOUS APPROACH, NEW TECHNOLOGY GROUP 5: ICD-10-PCS | Performed by: INTERNAL MEDICINE

## 2021-10-10 PROCEDURE — 85379 FIBRIN DEGRADATION QUANT: CPT | Performed by: FAMILY MEDICINE

## 2021-10-10 PROCEDURE — 83615 LACTATE (LD) (LDH) ENZYME: CPT | Performed by: INTERNAL MEDICINE

## 2021-10-10 PROCEDURE — 999N000157 HC STATISTIC RCP TIME EA 10 MIN

## 2021-10-10 PROCEDURE — 250N000009 HC RX 250: Performed by: INTERNAL MEDICINE

## 2021-10-10 PROCEDURE — 258N000003 HC RX IP 258 OP 636: Performed by: INTERNAL MEDICINE

## 2021-10-10 PROCEDURE — XW033H5 INTRODUCTION OF TOCILIZUMAB INTO PERIPHERAL VEIN, PERCUTANEOUS APPROACH, NEW TECHNOLOGY GROUP 5: ICD-10-PCS | Performed by: INTERNAL MEDICINE

## 2021-10-10 PROCEDURE — 250N000012 HC RX MED GY IP 250 OP 636 PS 637: Performed by: INTERNAL MEDICINE

## 2021-10-10 PROCEDURE — 36415 COLL VENOUS BLD VENIPUNCTURE: CPT | Performed by: FAMILY MEDICINE

## 2021-10-10 PROCEDURE — 250N000011 HC RX IP 250 OP 636: Performed by: FAMILY MEDICINE

## 2021-10-10 PROCEDURE — 250N000013 HC RX MED GY IP 250 OP 250 PS 637: Performed by: FAMILY MEDICINE

## 2021-10-10 PROCEDURE — 36415 COLL VENOUS BLD VENIPUNCTURE: CPT | Performed by: INTERNAL MEDICINE

## 2021-10-10 PROCEDURE — 120N000001 HC R&B MED SURG/OB

## 2021-10-10 PROCEDURE — 86140 C-REACTIVE PROTEIN: CPT | Performed by: FAMILY MEDICINE

## 2021-10-10 PROCEDURE — 85027 COMPLETE CBC AUTOMATED: CPT | Performed by: FAMILY MEDICINE

## 2021-10-10 PROCEDURE — 84145 PROCALCITONIN (PCT): CPT | Performed by: INTERNAL MEDICINE

## 2021-10-10 PROCEDURE — 85520 HEPARIN ASSAY: CPT | Performed by: INTERNAL MEDICINE

## 2021-10-10 PROCEDURE — 250N000011 HC RX IP 250 OP 636: Performed by: INTERNAL MEDICINE

## 2021-10-10 RX ORDER — HEPARIN SODIUM 10000 [USP'U]/100ML
0-5000 INJECTION, SOLUTION INTRAVENOUS CONTINUOUS
Status: DISCONTINUED | OUTPATIENT
Start: 2021-10-10 | End: 2021-10-11

## 2021-10-10 RX ORDER — FUROSEMIDE 20 MG
20 TABLET ORAL
Status: DISCONTINUED | OUTPATIENT
Start: 2021-10-10 | End: 2021-10-25 | Stop reason: HOSPADM

## 2021-10-10 RX ORDER — FUROSEMIDE 20 MG
20 TABLET ORAL
Status: DISCONTINUED | OUTPATIENT
Start: 2021-10-10 | End: 2021-10-10

## 2021-10-10 RX ADMIN — ALBUTEROL SULFATE 2 PUFF: 90 AEROSOL, METERED RESPIRATORY (INHALATION) at 11:45

## 2021-10-10 RX ADMIN — PAROXETINE HYDROCHLORIDE 40 MG: 20 TABLET, FILM COATED ORAL at 22:03

## 2021-10-10 RX ADMIN — DEXAMETHASONE 6 MG: 2 TABLET ORAL at 13:36

## 2021-10-10 RX ADMIN — INSULIN ASPART 3 UNITS: 100 INJECTION, SOLUTION INTRAVENOUS; SUBCUTANEOUS at 11:53

## 2021-10-10 RX ADMIN — INSULIN ASPART 2 UNITS: 100 INJECTION, SOLUTION INTRAVENOUS; SUBCUTANEOUS at 17:39

## 2021-10-10 RX ADMIN — ZOLPIDEM TARTRATE 5 MG: 5 TABLET ORAL at 22:03

## 2021-10-10 RX ADMIN — ALBUTEROL SULFATE 2 PUFF: 90 AEROSOL, METERED RESPIRATORY (INHALATION) at 17:39

## 2021-10-10 RX ADMIN — INSULIN GLARGINE 15 UNITS: 100 INJECTION, SOLUTION SUBCUTANEOUS at 08:39

## 2021-10-10 RX ADMIN — HEPARIN SODIUM 5000 UNITS: 10000 INJECTION, SOLUTION INTRAVENOUS; SUBCUTANEOUS at 00:33

## 2021-10-10 RX ADMIN — ALBUTEROL SULFATE 2 PUFF: 90 AEROSOL, METERED RESPIRATORY (INHALATION) at 22:02

## 2021-10-10 RX ADMIN — FUROSEMIDE 20 MG: 20 TABLET ORAL at 16:30

## 2021-10-10 RX ADMIN — LAMOTRIGINE 150 MG: 100 TABLET ORAL at 19:58

## 2021-10-10 RX ADMIN — FUROSEMIDE 20 MG: 10 INJECTION, SOLUTION INTRAMUSCULAR; INTRAVENOUS at 08:38

## 2021-10-10 RX ADMIN — HEPARIN SODIUM 5000 UNITS: 10000 INJECTION, SOLUTION INTRAVENOUS; SUBCUTANEOUS at 08:39

## 2021-10-10 RX ADMIN — ALBUTEROL SULFATE 2 PUFF: 90 AEROSOL, METERED RESPIRATORY (INHALATION) at 08:50

## 2021-10-10 RX ADMIN — INSULIN ASPART 2 UNITS: 100 INJECTION, SOLUTION INTRAVENOUS; SUBCUTANEOUS at 08:39

## 2021-10-10 RX ADMIN — DOXYCYCLINE HYCLATE 50 MG: 50 CAPSULE ORAL at 08:51

## 2021-10-10 RX ADMIN — METOPROLOL SUCCINATE 25 MG: 25 TABLET, FILM COATED, EXTENDED RELEASE ORAL at 08:37

## 2021-10-10 RX ADMIN — ZOLPIDEM TARTRATE 5 MG: 5 TABLET ORAL at 00:39

## 2021-10-10 RX ADMIN — LAMOTRIGINE 150 MG: 100 TABLET ORAL at 08:37

## 2021-10-10 RX ADMIN — SODIUM CHLORIDE 50 ML: 9 INJECTION, SOLUTION INTRAVENOUS at 11:39

## 2021-10-10 RX ADMIN — TOCILIZUMAB 600 MG: 20 INJECTION, SOLUTION, CONCENTRATE INTRAVENOUS at 19:56

## 2021-10-10 RX ADMIN — HEPARIN SODIUM 1000 UNITS/HR: 10000 INJECTION, SOLUTION INTRAVENOUS at 11:35

## 2021-10-10 RX ADMIN — REMDESIVIR 200 MG: 100 INJECTION, POWDER, LYOPHILIZED, FOR SOLUTION INTRAVENOUS at 11:38

## 2021-10-10 RX ADMIN — INSULIN GLARGINE 10 UNITS: 100 INJECTION, SOLUTION SUBCUTANEOUS at 10:39

## 2021-10-10 RX ADMIN — UMECLIDINIUM 1 PUFF: 62.5 AEROSOL, POWDER ORAL at 08:50

## 2021-10-10 RX ADMIN — SIMVASTATIN 20 MG: 20 TABLET, FILM COATED ORAL at 22:03

## 2021-10-10 ASSESSMENT — ACTIVITIES OF DAILY LIVING (ADL)
ADLS_ACUITY_SCORE: 12
DEPENDENT_IADLS:: INDEPENDENT
ADLS_ACUITY_SCORE: 12
ADLS_ACUITY_SCORE: 10
ADLS_ACUITY_SCORE: 12

## 2021-10-10 NOTE — CONSULTS
Care Management Initial Consult    General Information  Assessment completed with: Family, Nathalia and Tab  Type of CM/SW Visit: Initial Assessment    Primary Care Provider verified and updated as needed: Yes   Readmission within the last 30 days: no previous admission in last 30 days      Reason for Consult: discharge planning  Advance Care Planning: Advance Care Planning Reviewed: present on chart          Communication Assessment  Patient's communication style: spoken language (English or Bilingual)    Hearing Difficulty or Deaf: yes   Wear Glasses or Blind: no    Cognitive  Cognitive/Neuro/Behavioral: WDL  Level of Consciousness: alert  Arousal Level: opens eyes spontaneously  Orientation: oriented x 4  Mood/Behavior: calm, cooperative  Best Language: 0 - No aphasia  Speech: clear    Living Environment:   People in home: alone     Current living Arrangements: house      Able to return to prior arrangements: yes       Family/Social Support:  Care provided by: self  Provides care for: no one  Marital Status:   Significant Other, Children       Nathalia  Description of Support System: Supportive, Involved    Support Assessment: Adequate family and caregiver support    Current Resources:   Patient receiving home care services: Yes  Skilled Home Care Services: Skilled Nursing  Community Resources: Home Care  Equipment currently used at home: none  Supplies currently used at home: None    Employment/Financial:  Employment Status: , previous service, retired        Financial Concerns: No concerns identified   Referral to Financial Counselor: No       Lifestyle & Psychosocial Needs:  Social Determinants of Health     Tobacco Use: Medium Risk     Smoking Tobacco Use: Former Smoker     Smokeless Tobacco Use: Never Used   Alcohol Use:      Frequency of Alcohol Consumption:      Average Number of Drinks:      Frequency of Binge Drinking:    Financial Resource Strain:      Difficulty of Paying Living Expenses:     Food Insecurity:      Worried About Running Out of Food in the Last Year:      Ran Out of Food in the Last Year:    Transportation Needs:      Lack of Transportation (Medical):      Lack of Transportation (Non-Medical):    Physical Activity:      Days of Exercise per Week:      Minutes of Exercise per Session:    Stress:      Feeling of Stress :    Social Connections:      Frequency of Communication with Friends and Family:      Frequency of Social Gatherings with Friends and Family:      Attends Religion Services:      Active Member of Clubs or Organizations:      Attends Club or Organization Meetings:      Marital Status:    Intimate Partner Violence:      Fear of Current or Ex-Partner:      Emotionally Abused:      Physically Abused:      Sexually Abused:    Depression: Not at risk     PHQ-2 Score: 0   Housing Stability:      Unable to Pay for Housing in the Last Year:      Number of Places Lived in the Last Year:      Unstable Housing in the Last Year:        Functional Status:  Prior to admission patient needed assistance:   Dependent ADLs:: Independent  Dependent IADLs:: Independent  Assesssment of Functional Status: Not at baseline with ADL Functioning, Not at baseline with mobility, Not at  functional baseline    Mental Health Status:  Mental Health Status: No Current Concerns       Chemical Dependency Status:  Chemical Dependency Status: No Current Concerns             Values/Beliefs:  Spiritual, Cultural Beliefs, Religion Practices, Values that affect care: no               Additional Information:  Spoke with patient's son, Tab and LIATNathalia via phone, introduced self and role.  Patient currently lives in a house in [place], on his son, Tab's property (next door to son's home).  At baseline, he is independent with ADLs, does not use assistive devices for ambulation and continues to drive.  Patient does NOT wear home oxygen at baseline.  Tab states at times patient has shortness of breath, related  to his COPD, CHF, and is limited with his exercise tolerance.  Tab also states he can have short term memory loss at times.  He is current with Maura [facility] (Phone: [phone] Fax: [phone]), RN for medication management and vital check weekly.  Patient's SO, Nathalia lives locally, visits often and is very involved in his care.  She accompanies patient to all MD appointments ([facility]).      Preliminarily discussed discharge planning and options, home w/ resumed HC vs TCU.  Patient remains on HFNC, not stable for therapy evaluations at this time.  CM team to remain involved to assist/facilitate discharge discharge planning when appropriate.      CM notified [facility] (Phone: [phone] Fax: [phone]) of patient's admission.      PLAN:  BEN Menchaca MSN, RN  Inpatient Care Coordinator  [facility] [phone]  [facility] [phone]

## 2021-10-10 NOTE — PLAN OF CARE
Patient's saturation continue to drop, Dr. Wei notified  RT notified to place patient on HiFlo    Patient placed on Hi Jesus doing much better.  SpO2 92% or higher, continue to monitor     Lasix IV working well see I/O flowsheet

## 2021-10-10 NOTE — PROGRESS NOTES
[facility]    Medicine Progress Note - Hospitalist Service       Date of Admission:  10/8/2021    Assessment & Plan                  Remington Gutierrez is a 75 year old male admitted on 10/8/2021. [facility] is [facility] with history of COPD, systolic heart failure, s/p AICD, type 2 diabetes, Anxiety/depression. He presents with increasing shortness of breath for 3 or 4 days, especially with activity and lying down, nonproductive cough. Found to be COVID positive.    # Acute Hypoxic Respiratory Failure secondary to COVID-19 infection  # Viral Pneumonia secondary to COVID-19 infection     Symptom Onset 10/4/21   Date of 1st Positive Test 10/8/21   Vaccination Status Partially Vaccinated - 1st dose < 1 week prior to onset of symptoms            - Oxygen: titrate to keep SpO2 between 90-96%  - Labs: standard COVID admission labs ordered   - Imaging:   On admission CXR - Heart enlargement grossly similar to prior. There are new bilateral interstitial and groundglass opacities which could be related to pulmonary edema or atypical infection (favor infection). V/Q scan on admission low risk for PE.   - Breathing treatments:   albuterol and home tiotripium as below; avoid nebulizers in favor of MDIs   - IV fluids: minimize  - Antibiotics: not indicated   - COVID-Focused Medications:   Dexamethasone 6 mg  X 10 days started on 10/8/21.   Unable to give remdesivir due to GFR < 30 on admission. Started 10/10/2021   Tocilizumab ordered 10/10/2021   - DVT Prophylaxis:   On admission started heparin 5000 units every 8 hours due to renal function, given worsening change to Heparin low intensity without a loading dose 10/10/2021.   Consider anticoag on discharge for 30 days & until return to normal mobility      - 100 - 50     - 688  D-Dimer 1.4 - 1.1 - 0.6  Procalcitonin pending    BNP 3907    O2 needs increased from 5 to 7 LPM -rapid worsening yesterday 10/9/2021 to - HFNC 95%.   Improved/stable today 75%  - Continue current treatments.   - Start Remdesivir - Per ID given short course of remdesivir it is usually still given with GFR<30  - Check PCT  - Will give Tocilizumab. Discussed with ID (Dr. 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 On admission appears pre-renal due to COVID and likely recent poor intake. Admit creatinine 2.10, GFR 29, BUN 45. Given 500 ml bolus in ER     Need to be cautious with fluids given CHF with unknown details as below.      Creatinine slightly improving 2.1- 1.9 - 1.6  - Monitor I/O's, daily weights  - Hold lisinopril 5, lasix 20 BID and mobic until creatinine improves    Possible acute encephalopathy due to infection/hypoxia as above   Possible mild cognitive impairment/dementia  Noted to have some impairment on chart review. Baseline unclear, but sounds like he's had some fluctuant symptoms in the past. Sounds like he was more acutely confused in the ER, pulling out IV's x 2 and removing his oxygen.  However, at time of admission H+P he was improved and appeared likely at or near baseline.    Improved  -  follow     Chronic Systolic Congestive Heart Failure   Dilated Cardiomyopathy  Essential hypertension   Noted to have history of systolic CHF and dilated cardiomyopathy. Minimal [facility] records. Non-ishemic cardiomyopathy. ECHO 4/2021 - EF 25-30%. Severe LV dilation. Global hypokinesis. RVSF mildly reduced. Cardiac MRI 2/2021- c/w Non-ishemic cardiomyopathy. Ascending aorta 4.1 cm. Admitted with acute CHF [facility] 9/7-12/2021. Dry weight 202# per [facility] discharge summary 9/2021    On admission BNP elevated 3,907 (similar to 7/2021 ED visit for chf/copd), CXR on admission consistent with congestive heart failure or covid (favor infection). Per patiemt daily weights were stable 198-200#  prior to admission.     - Initially held usual oral lasix 20 BID. Due to O2 needs increasing started lasix 20mg IV BID. Will change back to usual oral dosing today.   - Held home lisinopril 5 on admission given pre-renal TANIA as above  - Continue home metoprolol XL 25 with holding parameters.    - Follow daily weights and I/Os.  None done so far. Baseline weight [facility]   - Echo ordered but unable to do until Monday.    COPD    Noted on chart review. PFTs unavailable. Not on home oxygen. Not clearly in an exacerbation   - Continued home tiotropium and albuterol, but will schedule albuterol 4 times daily with additional every 2 hours prn for now.    - On dexamethasone as above    Elevated Troponin - suspect due to COVID/strain, not ACS  Initial troponin 0.107. ECG shows paced rhythm, limiting interpretation. Asymptomatic.  Suspect this elevation is related to COVID.  Holding off on tele given low risk with concerns about mental status and removing lines as above.    - repeat troponin 0.023    Diabetes Mellitus, Type 2  A1c 7.2 on 10/5/2021    BS are high   - Holding home jardiance 25, glipizide 5 and metformin  1000BID   - Started lantus 15, increase to 25  - Moderate sliding scale insulin     BENNY  Mild, but severe supine by sleep study 2008. Reports using CPAP at home,   - Using oxygen here for now.    - Avoid supine position while sleeping off CPAP    Anxiety and Depression  Insomnia  Previously diagnosed.  Appeared stable at time of admission H+P.    - continue home lamictal 150, paxil 40   - continue home ambien, but will decrease dosage to 6.25 mg from prior to admission 12.5 mg at least while here, confirm long-term plan with primary care provider.      Sinus node dysfunction   S/p AICD 5/4/21  Paced rhythm noted on ECG.    Rosacea  Continue home doxycycline daily.           Diet: Consistent Carb 60 grams CHO per Meal Diet    DVT Prophylaxis: On heparin 5000 q8, given worsening will change to Heparin low intensity  without a loading dose.   Rose Catheter: Not present  Central Lines: None  Code Status: Full Code      Disposition Plan   Expected discharge: 10/13/2021   recommended to prior living arrangement once O2 use less than 3 liters/minute.     The patient's care was discussed with the Bedside Nurse, Patient and ID Consultant.    Franklin Wei MD  Hospitalist Service  [facility]  Securely message with the Vocera Web Console (learn more here)  Text page via [employer] Paging/Directory      Clinically Significant Risk Factors Present on Admission               ______________________________________________________________________    Interval History   Feeling better    Vitals:    10/10/21 0235   Weight: 84.7 kg (186 lb 11.7 oz)     Wt Readings from Last 4 Encounters:   10/10/21 84.7 kg (186 lb 11.7 oz)   07/23/21 99.8 kg (220 lb)   03/02/21 99.8 kg (220 lb)   02/21/20 97.4 kg (214 lb 12.8 oz)         Data reviewed today: I reviewed all medications, new labs and imaging results over the last 24 hours. I personally reviewed no images or EKG's today.    Physical Exam   Vital Signs: Temp: 97.7  F (36.5  C) Temp src: Oral BP: 106/50 Pulse: 67   Resp: 18 SpO2: 96 % O2 Device: High Flow Nasal Cannula (HFNC) Oxygen Delivery: 40 LPM  Weight: 186 lbs 11.67 oz  Constitutional: awake, alert, cooperative, no apparent distress, and appears stated age    Data     Recent Labs   Lab 10/10/21  0753 10/10/21  0459 10/10/21  0233 10/09/21  2124 10/09/21  1642 10/09/21  1122   * 232* 264* 316* 219* 265*       CMPRecent Labs   Lab 10/10/21  0753 10/10/21  0459 10/10/21  0233 10/09/21  2124 10/09/21  0752 10/09/21  0521 10/08/21  1614 10/08/21  0442   NA  --  134  --   --   --  134  --  130*  130*   POTASSIUM  --  4.2  --   --   --  4.1  --  5.2   CHLORIDE  --  101  --   --   --  106  --  97   CO2  --  20  --   --   --  20  --  20   ANIONGAP  --  13  --   --   --  8  --  13   * 232* 264* 316*   < > 238*   < >  125*   BUN  --  68*  --   --   --  57*  --  45*   CR  --  1.69*  --   --   --  1.90*  --  2.10*  2.10*   GFRESTIMATED  --  38*  --   --   --  33*  --  29*   EHSAN  --  8.9  --   --   --  8.5  --  9.1   PROTTOTAL  --   --   --   --   --  7.3  --  7.9   ALBUMIN  --   --   --   --   --  3.1*  --  3.8   BILITOTAL  --   --   --   --   --  0.4  --  0.8   ALKPHOS  --   --   --   --   --  93  --  87   AST  --   --   --   --   --  75*  --  108*   ALT  --   --   --   --   --  39  --  45    < > = values in this interval not displayed.     CBC  Recent Labs   Lab 10/10/21  0459 10/09/21  0521 10/08/21  0442   WBC 8.0 5.7 4.4   RBC 4.65 4.91 4.55   HGB 15.1 16.0 14.9   HCT 43.2 46.4 43.6   MCV 93 95 96   MCH 32.5 32.6 32.7   MCHC 35.0 34.5 34.2   RDW 12.6 12.5 12.4    117* 94*     INR  Recent Labs   Lab 10/08/21  1400   INR 1.13       Venous Blood Gas  Recent Labs   Lab 10/08/21  0442   PHV 7.34   PCO2V 43   PO2V 12*   HCO3V 23   MANJINDER -3.0   O2PER 30

## 2021-10-10 NOTE — PLAN OF CARE
"Patient alert/oriented. SBA+walker. Patient stands from chair to use urinal with assistance.  Sats dip with this activity to 86-88%.  Increased HFNC to 40L 100% to maintain sats.  RT assessed and adjusted to 45L 60% FiO2--sats currently 91%    2 IV's placed.  Remdisivir infused.  Hep infusion started 1,000 units/hr.  Anti X level due @ 1730.  Patient denies pain. /63   Pulse 64   Temp 97.7  F (36.5  C)   Resp 18   Ht 1.733 m (5' 8.23\")   Wt 84.7 kg (186 lb 11.7 oz)   SpO2 92%   BMI 28.20 kg/m    Silvina Flood RN on 10/10/2021 at 1:09 PM    "

## 2021-10-11 ENCOUNTER — APPOINTMENT (OUTPATIENT)
Dept: CARDIOLOGY | Facility: CLINIC | Age: 76
DRG: 177 | End: 2021-10-11
Attending: FAMILY MEDICINE
Payer: COMMERCIAL

## 2021-10-11 LAB
ANION GAP SERPL CALCULATED.3IONS-SCNC: 12 MMOL/L (ref 3–14)
BUN SERPL-MCNC: 69 MG/DL (ref 7–30)
CALCIUM SERPL-MCNC: 8.8 MG/DL (ref 8.5–10.1)
CHLORIDE BLD-SCNC: 103 MMOL/L (ref 94–109)
CO2 SERPL-SCNC: 21 MMOL/L (ref 20–32)
CREAT SERPL-MCNC: 1.38 MG/DL (ref 0.66–1.25)
CRP SERPL-MCNC: 34.3 MG/L (ref 0–8)
D DIMER PPP FEU-MCNC: 0.64 UG/ML FEU (ref 0–0.5)
ERYTHROCYTE [DISTWIDTH] IN BLOOD BY AUTOMATED COUNT: 12.5 % (ref 10–15)
FIBRINOGEN PPP-MCNC: 650 MG/DL (ref 170–490)
GFR SERPL CREATININE-BSD FRML MDRD: 49 ML/MIN/1.73M2
GLUCOSE BLD-MCNC: 194 MG/DL (ref 70–99)
GLUCOSE BLDC GLUCOMTR-MCNC: 190 MG/DL (ref 70–99)
GLUCOSE BLDC GLUCOMTR-MCNC: 209 MG/DL (ref 70–99)
GLUCOSE BLDC GLUCOMTR-MCNC: 214 MG/DL (ref 70–99)
GLUCOSE BLDC GLUCOMTR-MCNC: 229 MG/DL (ref 70–99)
GLUCOSE BLDC GLUCOMTR-MCNC: 284 MG/DL (ref 70–99)
HCT VFR BLD AUTO: 43 % (ref 40–53)
HGB BLD-MCNC: 15 G/DL (ref 13.3–17.7)
HOLD SPECIMEN: NORMAL
HOLD SPECIMEN: NORMAL
LDH SERPL L TO P-CCNC: 714 U/L (ref 85–227)
LVEF ECHO: NORMAL
MCH RBC QN AUTO: 32.2 PG (ref 26.5–33)
MCHC RBC AUTO-ENTMCNC: 34.9 G/DL (ref 31.5–36.5)
MCV RBC AUTO: 92 FL (ref 78–100)
PLATELET # BLD AUTO: 200 10E3/UL (ref 150–450)
POTASSIUM BLD-SCNC: 4.1 MMOL/L (ref 3.4–5.3)
RBC # BLD AUTO: 4.66 10E6/UL (ref 4.4–5.9)
SODIUM SERPL-SCNC: 136 MMOL/L (ref 133–144)
TROPONIN I SERPL-MCNC: 0.02 UG/L (ref 0–0.04)
UFH PPP CHRO-ACNC: 0.32 IU/ML
UFH PPP CHRO-ACNC: 0.4 IU/ML
WBC # BLD AUTO: 5.5 10E3/UL (ref 4–11)

## 2021-10-11 PROCEDURE — 86140 C-REACTIVE PROTEIN: CPT | Performed by: FAMILY MEDICINE

## 2021-10-11 PROCEDURE — 85520 HEPARIN ASSAY: CPT | Performed by: INTERNAL MEDICINE

## 2021-10-11 PROCEDURE — 99233 SBSQ HOSP IP/OBS HIGH 50: CPT

## 2021-10-11 PROCEDURE — 250N000011 HC RX IP 250 OP 636

## 2021-10-11 PROCEDURE — 85384 FIBRINOGEN ACTIVITY: CPT | Performed by: FAMILY MEDICINE

## 2021-10-11 PROCEDURE — 80048 BASIC METABOLIC PNL TOTAL CA: CPT | Performed by: FAMILY MEDICINE

## 2021-10-11 PROCEDURE — 250N000012 HC RX MED GY IP 250 OP 636 PS 637: Performed by: FAMILY MEDICINE

## 2021-10-11 PROCEDURE — 255N000002 HC RX 255 OP 636

## 2021-10-11 PROCEDURE — 85027 COMPLETE CBC AUTOMATED: CPT | Performed by: FAMILY MEDICINE

## 2021-10-11 PROCEDURE — 250N000012 HC RX MED GY IP 250 OP 636 PS 637: Performed by: INTERNAL MEDICINE

## 2021-10-11 PROCEDURE — 250N000009 HC RX 250: Performed by: INTERNAL MEDICINE

## 2021-10-11 PROCEDURE — 258N000003 HC RX IP 258 OP 636: Performed by: INTERNAL MEDICINE

## 2021-10-11 PROCEDURE — 250N000013 HC RX MED GY IP 250 OP 250 PS 637: Performed by: INTERNAL MEDICINE

## 2021-10-11 PROCEDURE — 84484 ASSAY OF TROPONIN QUANT: CPT | Performed by: FAMILY MEDICINE

## 2021-10-11 PROCEDURE — 999N000208 ECHOCARDIOGRAM COMPLETE

## 2021-10-11 PROCEDURE — 36415 COLL VENOUS BLD VENIPUNCTURE: CPT | Performed by: INTERNAL MEDICINE

## 2021-10-11 PROCEDURE — 85379 FIBRIN DEGRADATION QUANT: CPT | Performed by: FAMILY MEDICINE

## 2021-10-11 PROCEDURE — 120N000001 HC R&B MED SURG/OB

## 2021-10-11 PROCEDURE — 93306 TTE W/DOPPLER COMPLETE: CPT | Mod: 26 | Performed by: INTERNAL MEDICINE

## 2021-10-11 PROCEDURE — 250N000013 HC RX MED GY IP 250 OP 250 PS 637: Performed by: FAMILY MEDICINE

## 2021-10-11 PROCEDURE — 83615 LACTATE (LD) (LDH) ENZYME: CPT | Performed by: INTERNAL MEDICINE

## 2021-10-11 RX ADMIN — METOPROLOL SUCCINATE 25 MG: 25 TABLET, FILM COATED, EXTENDED RELEASE ORAL at 08:15

## 2021-10-11 RX ADMIN — FUROSEMIDE 20 MG: 20 TABLET ORAL at 08:15

## 2021-10-11 RX ADMIN — ENOXAPARIN SODIUM 40 MG: 100 INJECTION SUBCUTANEOUS at 16:28

## 2021-10-11 RX ADMIN — SODIUM CHLORIDE 50 ML: 9 INJECTION, SOLUTION INTRAVENOUS at 20:44

## 2021-10-11 RX ADMIN — SIMVASTATIN 20 MG: 20 TABLET, FILM COATED ORAL at 20:34

## 2021-10-11 RX ADMIN — UMECLIDINIUM 1 PUFF: 62.5 AEROSOL, POWDER ORAL at 08:18

## 2021-10-11 RX ADMIN — ALBUTEROL SULFATE 2 PUFF: 90 AEROSOL, METERED RESPIRATORY (INHALATION) at 16:38

## 2021-10-11 RX ADMIN — INSULIN ASPART 3 UNITS: 100 INJECTION, SOLUTION INTRAVENOUS; SUBCUTANEOUS at 12:19

## 2021-10-11 RX ADMIN — INSULIN GLARGINE 25 UNITS: 100 INJECTION, SOLUTION SUBCUTANEOUS at 10:22

## 2021-10-11 RX ADMIN — HUMAN ALBUMIN MICROSPHERES AND PERFLUTREN 2 ML: 10; .22 INJECTION, SOLUTION INTRAVENOUS at 09:23

## 2021-10-11 RX ADMIN — INSULIN ASPART 2 UNITS: 100 INJECTION, SOLUTION INTRAVENOUS; SUBCUTANEOUS at 16:39

## 2021-10-11 RX ADMIN — DOXYCYCLINE HYCLATE 50 MG: 50 CAPSULE ORAL at 08:16

## 2021-10-11 RX ADMIN — ALBUTEROL SULFATE 2 PUFF: 90 AEROSOL, METERED RESPIRATORY (INHALATION) at 08:20

## 2021-10-11 RX ADMIN — INSULIN ASPART 2 UNITS: 100 INJECTION, SOLUTION INTRAVENOUS; SUBCUTANEOUS at 08:26

## 2021-10-11 RX ADMIN — LAMOTRIGINE 150 MG: 100 TABLET ORAL at 20:33

## 2021-10-11 RX ADMIN — FUROSEMIDE 20 MG: 20 TABLET ORAL at 16:28

## 2021-10-11 RX ADMIN — ALBUTEROL SULFATE 2 PUFF: 90 AEROSOL, METERED RESPIRATORY (INHALATION) at 20:36

## 2021-10-11 RX ADMIN — PAROXETINE HYDROCHLORIDE 40 MG: 20 TABLET, FILM COATED ORAL at 20:34

## 2021-10-11 RX ADMIN — DEXAMETHASONE 6 MG: 2 TABLET ORAL at 14:09

## 2021-10-11 RX ADMIN — LAMOTRIGINE 150 MG: 100 TABLET ORAL at 08:15

## 2021-10-11 RX ADMIN — ALBUTEROL SULFATE 2 PUFF: 90 AEROSOL, METERED RESPIRATORY (INHALATION) at 12:19

## 2021-10-11 RX ADMIN — REMDESIVIR 100 MG: 100 INJECTION, POWDER, LYOPHILIZED, FOR SOLUTION INTRAVENOUS at 20:37

## 2021-10-11 ASSESSMENT — ACTIVITIES OF DAILY LIVING (ADL)
ADLS_ACUITY_SCORE: 10
ADLS_ACUITY_SCORE: 12
ADLS_ACUITY_SCORE: 10
ADLS_ACUITY_SCORE: 12

## 2021-10-11 NOTE — PROGRESS NOTES
[facility]    Medicine Progress Note - Hospitalist Service       Date of Admission:  10/8/2021    Assessment & Plan                  Remington Gutierrez is a 75 year old male admitted on 10/8/2021. [facility] with history of COPD, systolic heart failure, S/P AICD, type 2 diabetes, and anxiety/depression. He presented with increasing shortness of breath for 3 or 4 days, especially with activity and lying down, nonproductive cough, and was found to be COVID positive.    # Acute Hypoxic Respiratory Failure secondary to COVID-19 infection  # Viral Pneumonia secondary to COVID-19 infection     Symptom Onset 10/4/2021   Date of 1st Positive Test 10/8/2021   Vaccination Status Partially Vaccinated - 1st dose < 1 week prior to onset of symptoms          Initial chest x-ray demonstrated bilateral interstitial and groundglass opacities consistent with pneumonia.  He initially required oxygen at 5 L/min per nasal cannula, though he came more hypoxemic 10/9/2021, requiring the use of HFNC oxygen, currently 45 L/min and FiO2 0.75.  He thinks that his respiratory symptoms are a bit better than they were yesterday.  He has almost no rest dyspnea.  Exertional dyspnea is mild.  He continues to have a cough, though says that it is not frequent or severe.  Denies respiratory chest pain.  He remains afebrile.    - Continue air on the medical floor under COVID-19 special precautions and with continuous oximetry.  - Oxygen: Continue HFNC 45 L/min with FiO2 0.75, and titrate to keep SpO2 between 90-96%.  - Labs: .0 --> 100.0 --> 50.5 --> 34.3,  --> 714, fibrinogen 633 --> 677 --> 668 --> 650, d-Dimer 1.43 --> 1.17 --> 0.67 --> 0.64.  Trend toward improvement is noted.  We will continue to check labs daily until clinical stability is achieved.  [facility] admission labs ordered   - Imaging: No additional imaging necessary.  - Breathing treatments:  Continue albuterol HFA and home  tiotropium inhalers as below; avoid nebulizers in favor of MDIs.  - IV fluids: None currently indicated.  - Antibiotics: Not indicated.  - COVID-Focused Medications:   Dexamethasone 6 mg  X 10 days started on 10/8/2021.   Unable to give remdesivir due to GFR < 30 on admission, was started 10/10/2021.  We will plan on a 5-day course IV.  Tocilizumab 600 mg IV given 10/10/2021 after discussion with Dr. Tapia, infectious disease.  - DVT Prophylaxis:   On admission started heparin 5000 units every 8 hours due to renal function, given worsening change to Heparin low intensity without a loading dose 10/10/2021 which continues.  Now that GFR has improved to 49, will change to enoxaparin 0.5 mg/kg subcu every 12 hours at least until HFNC oxygen no longer necessary.  Consider anticoag on discharge for 30 days & until return to normal mobility     Acute Kidney Injury  Chronic kidney disease stage 2  Baseline GFR appears to be 60-65.  Admission GFR 29, likely representing acute kidney injury due to volume depletion from acute illness.  He received a 500 mL IV fluid bolus in ED, and preadmission lisinopril, furosemide, and Mobic were held.  There is been a gradual improvement in GFR: 29 --> 33 --> 38 --> 49 today.   -Anticipate further improvement in GFR to baseline without any additional treatment.  -Lisinopril can be resumed when needed for the control of hypertension, though is not currently needed.  -Preadmission furosemide 20 mg p.o. twice daily has been resumed due to underlying systolic congestive heart failure, see below.  -Hold Mobic until GFR returns to baseline.    Possible acute encephalopathy due to COVID-19   Possible mild cognitive impairment/dementia  Noted to have some impairment on chart review.  Baseline unclear, but sounds like he's had some fluctuating symptoms in the past. Sounds like he was more acutely confused in the ER, pulling out IV's x 2 and removing his oxygen.  However, at time of admission H+P  he was improved and appeared likely at cognitive baseline.  There has been no subsequent confusion or agitation seen.  -Resolved.    Chronic Systolic Congestive Heart Failure   Dilated Cardiomyopathy  Essential hypertension   Noted to have history of systolic CHF and dilated cardiomyopathy. [facility]. Non-ishemic cardiomyopathy. ECHO 4/2021 - EF 25-30%. Severe LV dilation. Global hypokinesis. RVSF mildly reduced. Cardiac MRI 2/2021- c/w Non-ishemic cardiomyopathy. Ascending aorta 4.1 cm. Admitted with acute CHF [facility] 9/7-12/2021. Dry weight 202# per [facility] discharge summary 9/2021    On admission BNP elevated 3,907 (similar to 7/2021 ED visit for CHF/COPD). CXR on admission consistent with COVID pneumonia.  On exam today, there is no suggestion of exacerbation of CHF.  Echocardiogram performed 10/11/2021 demonstrated a moderately dilated left ventricle, with severely reduced left ventricular systolic function, ejection fraction 30 to 35%.  Global hypokinesis was seen.  There was also grade 2 or moderate diastolic dysfunction noted.    -Continue preadmission furosemide 20 mg p.o. twice daily.  -Acute kidney injury has improved to an extent that lisinopril can be resumed.  However, the patient's blood pressure has been borderline low.  Sitter restarting lisinopril tomorrow if we have room in terms of BP.  -Continue home metoprolol XL 25 with holding parameters.    -Follow daily weights and I/Os.       COPD    Noted on chart review. PFTs unavailable. Not on home oxygen.  Today's exam suggests mild expiratory phase prolongation, without wheeze.  -Continued preadmission albuterol, though will schedule it 4 times daily rather than use it as needed.    -Continue preadmission tiotropium.      Elevated troponin   Initial troponin 0.107. ECG showed paced rhythm, limiting interpretation. Asymptomatic; patient reported no chest pain prior to admission or since admission.  Suspect this elevation represents strain related  to COVID pneumonia and hypoxemic failure.  Next troponin was normal at 0.023 --> 0.019.  -No additional troponin monitoring planned.    Diabetes Mellitus, Type 2  Managed prior to admission with empagliflozin 12.5 mg daily, glipizide 5 mg daily, and metformin 500 mg twice daily.  These outpatient medications have been held since admission. Hemoglobin A1c 7.2 on 10/5/2021.  The patient has been hyperglycemic since dexamethasone was added.  He is currently receiving glargine 25 units subcu every morning, as well as a NovoLog medium insulin resistance sliding scale.  He has been hyperglycemic on his present regimen, 190-284 over the last 5 checks.  -Increase glargine to 35 units subcu every morning starting tomorrow morning.  -Add prandial 6 units subcu 3 times daily AC.  -Continue NovoLog medium resistance sliding scale.  -We will hold empagliflozin, glipizide, and Metformin for now.    BENNY  Mild, but severe supine by sleep study 2008. Reports using CPAP at home,   - Using oxygen here for now.    - Avoid supine position while sleeping off CPAP    Anxiety and Depression  Insomnia  Previously diagnosed, and managed prior to admission with lamotrigine 150 mg twice daily and paroxetine 40 mg p.o. nightly, both continued here.  Mood and affect have been stable this hospital stay.    -Continue preadmission lamotrigine and paroxetine.  -Continue preadmission zolpidem, but decreased dosage to 6.25 mg from prior to admission 12.5 mg at least while here, confirm long-term plan with primary care provider.      Sinus node dysfunction   S/P AICD 5/4/2021  Paced rhythm noted on ECG.  He is not currently on telemetry.    Rosacea  Continue home doxycycline daily.           Diet: Consistent Carb 60 grams CHO per Meal Diet    DVT Prophylaxis: Changing to enoxaparin 0.5 mg subcu every 12 hours now that acute kidney injury has improved.  Rose Catheter: Not present  Central Lines: None  Code Status: Full Code      Disposition Plan  "  Expected discharge: I anticipate he will be ready for discharge in 3-5 additional hospital days, once oxygen needs are stable, home supplemental oxygen arranged if required, and symptoms are manageable.          Sander Ware MD  Hospitalist Service  [facility]  Securely message with the Vocera Web Console (learn more here)  Text page via [facility] Paging/Directory              ______________________________________________________________________    Interval History   He says that his respiratory symptoms are better than yesterday.  Specifically, he has almost no rest dyspnea now, and his exertional dyspnea is less bothersome.  Cough persists, though is less frequent.  He is not bringing up any sputum.  Nuys respiratory chest pain.  Appetite is \"so-so\" but improving.  Denies dysphagia, nausea, vomiting, or diarrhea.    Vitals:    10/10/21 0235   Weight: 84.7 kg (186 lb 11.7 oz)     Wt Readings from Last 4 Encounters:   10/10/21 84.7 kg (186 lb 11.7 oz)   07/23/21 99.8 kg (220 lb)   03/02/21 99.8 kg (220 lb)   02/21/20 97.4 kg (214 lb 12.8 oz)         Data reviewed today: I reviewed all medications, new labs and imaging results over the last 24 hours. I personally reviewed no images or EKG's today.    Physical Exam   Vital Signs: Temp: (!) 96.2  F (35.7  C) Temp src: Axillary BP: 113/56 Pulse: 75   Resp: 22 SpO2: 94 % O2 Device: High Flow Nasal Cannula (HFNC) Oxygen Delivery: 45 LPM  Weight: 186 lbs 11.67 oz    GENERAL: Pleasant man, seated on the edge of his bed.  He appears comfortable.  EYES: Eyes grossly normal to inspection, extraocular movements intact  HENT: HFNC in place.  NECK: Trachea midline, no stridor.    RESP: No accessory muscle use.  Aside from a few inspiratory crackles at the right posterior base, lungs clear throughout on inspiration and expiration.  Expiration not prolonged, no wheeze.  CV: Regular rate and rhythm, non-tachycardic.  Normal S1 S2, no murmur or extra " sound.  No lower extremity edema.  ABDOMEN: Soft, non-tender, no guarding.  Liver and spleen not enlarged, no masses palpable.  Bowel sounds positive.  MS: No bony deformities noted.  No red or inflamed joints.  SKIN: Warm and dry, no rashes.  NEURO: Alert, oriented, conversant.  Cranial nerves III - XII grossly intact aside from being hard of hearing.  No gross motor or sensory deficits.  Gait steady, symmetrical.  PSYCH: Calm, alert, conversant.  Able to articulate logical thoughts, no tangential thoughts, no hallucinations or delusions.  Affect normal.        Data     Recent Labs   Lab 10/11/21  1214 10/11/21  0742 10/11/21  0233 10/11/21  0215 10/10/21  2215 10/10/21  1704   * 190* 209* 194* 265* 218*       CMP  Recent Labs   Lab 10/11/21  1214 10/11/21  0742 10/11/21  0233 10/11/21  0215 10/10/21  0753 10/10/21  0459 10/09/21  0752 10/09/21  0521 10/08/21  1614 10/08/21  0442   NA  --   --   --  136  --  134  --  134  --  130*  130*   POTASSIUM  --   --   --  4.1  --  4.2  --  4.1  --  5.2   CHLORIDE  --   --   --  103  --  101  --  106  --  97   CO2  --   --   --  21  --  20  --  20  --  20   ANIONGAP  --   --   --  12  --  13  --  8  --  13   * 190* 209* 194*   < > 232*   < > 238*   < > 125*   BUN  --   --   --  69*  --  68*  --  57*  --  45*   CR  --   --   --  1.38*  --  1.69*  --  1.90*  --  2.10*  2.10*   GFRESTIMATED  --   --   --  49*  --  38*  --  33*  --  29*   EHSAN  --   --   --  8.8  --  8.9  --  8.5  --  9.1   PROTTOTAL  --   --   --   --   --   --   --  7.3  --  7.9   ALBUMIN  --   --   --   --   --   --   --  3.1*  --  3.8   BILITOTAL  --   --   --   --   --   --   --  0.4  --  0.8   ALKPHOS  --   --   --   --   --   --   --  93  --  87   AST  --   --   --   --   --   --   --  75*  --  108*   ALT  --   --   --   --   --   --   --  39  --  45    < > = values in this interval not displayed.     CBC  Recent Labs   Lab 10/11/21  0215 10/10/21  0459 10/09/21  0521 10/08/21  0442   WBC  5.5 8.0 5.7 4.4   RBC 4.66 4.65 4.91 4.55   HGB 15.0 15.1 16.0 14.9   HCT 43.0 43.2 46.4 43.6   MCV 92 93 95 96   MCH 32.2 32.5 32.6 32.7   MCHC 34.9 35.0 34.5 34.2   RDW 12.5 12.6 12.5 12.4    164 117* 94*     INR  Recent Labs   Lab 10/08/21  1400   INR 1.13       Venous Blood Gas  Recent Labs   Lab 10/08/21  0442   PHV 7.34   PCO2V 43   PO2V 12*   HCO3V 23   MANJINDER -3.0   O2PER 30      Sander Ware MD  [facility]

## 2021-10-11 NOTE — PLAN OF CARE
Patient tries to lay on his sides with no success.  Up in chair for few hours during nite to assist with sleeping, patient slept few hours.  Heparin gtt:  10A @ 1730 = 0.8 heparin gtt turned off for 1 hr  1945 heparin re-started at 700/hr  10A @ 0200 = 0.4 no change, next 10A draw scheduled for 0945 0200 BS =209    Patient can be intermittently confused when saturation drops, re-orients well    Hi Jesus remains at 45L. But increased Fi02 increased to 65%

## 2021-10-11 NOTE — PROGRESS NOTES
Xa level remains within range X2 and next lab ordered for tomorrow morning.  Patient's O2 sats are very sensitive to activity and he desats as low as 82% after any activity, in or out of bed. Nursing attempting to adjust O2 requirements via HFNC as appropriate. He tends to breathe from mouth and needs much encouragement to inhale through nares.   Currently at 80% and 45L.

## 2021-10-11 NOTE — PROGRESS NOTES
Wife called and reported she was concerned that patient was confused. She was given update on changes to heparin/ lovenox and insulin increase today.   Upon assessment, patient was alert/ oriented X4 and no other obvious changes to neuro status.   Not able to wean O2 the rest of the day shift, but patient is showing slight progress when recovering after standing at bedside. Lungs clear/ diminished throughout.   Started on lovenox and heparin gtt stopped.   BG elevated; given scheduled and sliding scale insulin for dinner.

## 2021-10-11 NOTE — PLAN OF CARE
Problem: Adult Inpatient Plan of Care  Goal: Plan of Care Review  Outcome: No Change   Remains on HFNC 75% and needs increase with activity. Has recovered slightly better this evening.      Problem: Adult Inpatient Plan of Care  Goal: Absence of Hospital-Acquired Illness or Injury  Intervention: Identify and Manage Fall Risk  Recent Flowsheet Documentation  Taken 10/11/2021 1638 by Silvina Pierson RN  Safety Promotion/Fall Prevention: activity supervised  Taken 10/11/2021 0820 by Silvina Pierson RN  Safety Promotion/Fall Prevention: activity supervised     Problem: Adult Inpatient Plan of Care  Goal: Absence of Hospital-Acquired Illness or Injury  Intervention: Prevent and Manage VTE (Venous Thromboembolism) Risk  Recent Flowsheet Documentation  Taken 10/11/2021 1638 by Silvina Pierson RN  VTE Prevention/Management: (heparin stopped and lovenox BID started) anticoagulant therapy adjusted

## 2021-10-11 NOTE — PROGRESS NOTES
Still requiring 75% O2 with 45L on HFNC today. Would wean him down, but sats drop significantly with any activity. Continues to use urinal at bedside. He reports increase in urination after receiving lasix PO today.   BG elevated and given sliding scale at meals. Poor appetite.   More alert than previous shifts per report.   IS instructions given again.   Denies pain.

## 2021-10-12 LAB
ANION GAP SERPL CALCULATED.3IONS-SCNC: 6 MMOL/L (ref 3–14)
BUN SERPL-MCNC: 55 MG/DL (ref 7–30)
CALCIUM SERPL-MCNC: 9 MG/DL (ref 8.5–10.1)
CHLORIDE BLD-SCNC: 109 MMOL/L (ref 94–109)
CO2 SERPL-SCNC: 24 MMOL/L (ref 20–32)
CREAT SERPL-MCNC: 1.17 MG/DL (ref 0.66–1.25)
CRP SERPL-MCNC: 16.2 MG/L (ref 0–8)
D DIMER PPP FEU-MCNC: 0.61 UG/ML FEU (ref 0–0.5)
ERYTHROCYTE [DISTWIDTH] IN BLOOD BY AUTOMATED COUNT: 12.4 % (ref 10–15)
FIBRINOGEN PPP-MCNC: 556 MG/DL (ref 170–490)
GFR SERPL CREATININE-BSD FRML MDRD: 60 ML/MIN/1.73M2
GLUCOSE BLD-MCNC: 230 MG/DL (ref 70–99)
GLUCOSE BLDC GLUCOMTR-MCNC: 123 MG/DL (ref 70–99)
GLUCOSE BLDC GLUCOMTR-MCNC: 132 MG/DL (ref 70–99)
GLUCOSE BLDC GLUCOMTR-MCNC: 192 MG/DL (ref 70–99)
GLUCOSE BLDC GLUCOMTR-MCNC: 216 MG/DL (ref 70–99)
GLUCOSE BLDC GLUCOMTR-MCNC: 269 MG/DL (ref 70–99)
HCT VFR BLD AUTO: 42.7 % (ref 40–53)
HGB BLD-MCNC: 14.9 G/DL (ref 13.3–17.7)
LDH SERPL L TO P-CCNC: 637 U/L (ref 85–227)
MCH RBC QN AUTO: 32 PG (ref 26.5–33)
MCHC RBC AUTO-ENTMCNC: 34.9 G/DL (ref 31.5–36.5)
MCV RBC AUTO: 92 FL (ref 78–100)
PLATELET # BLD AUTO: 203 10E3/UL (ref 150–450)
POTASSIUM BLD-SCNC: 4.3 MMOL/L (ref 3.4–5.3)
RBC # BLD AUTO: 4.65 10E6/UL (ref 4.4–5.9)
SODIUM SERPL-SCNC: 139 MMOL/L (ref 133–144)
WBC # BLD AUTO: 5.7 10E3/UL (ref 4–11)

## 2021-10-12 PROCEDURE — 250N000013 HC RX MED GY IP 250 OP 250 PS 637: Performed by: FAMILY MEDICINE

## 2021-10-12 PROCEDURE — 250N000012 HC RX MED GY IP 250 OP 636 PS 637

## 2021-10-12 PROCEDURE — 86140 C-REACTIVE PROTEIN: CPT | Performed by: FAMILY MEDICINE

## 2021-10-12 PROCEDURE — 80048 BASIC METABOLIC PNL TOTAL CA: CPT | Performed by: FAMILY MEDICINE

## 2021-10-12 PROCEDURE — 250N000011 HC RX IP 250 OP 636

## 2021-10-12 PROCEDURE — 85379 FIBRIN DEGRADATION QUANT: CPT | Performed by: FAMILY MEDICINE

## 2021-10-12 PROCEDURE — 120N000001 HC R&B MED SURG/OB

## 2021-10-12 PROCEDURE — 85384 FIBRINOGEN ACTIVITY: CPT | Performed by: FAMILY MEDICINE

## 2021-10-12 PROCEDURE — 250N000012 HC RX MED GY IP 250 OP 636 PS 637: Performed by: FAMILY MEDICINE

## 2021-10-12 PROCEDURE — 93005 ELECTROCARDIOGRAM TRACING: CPT

## 2021-10-12 PROCEDURE — 999N000215 HC STATISTIC HFNC ADULT NON-CPAP

## 2021-10-12 PROCEDURE — 83615 LACTATE (LD) (LDH) ENZYME: CPT | Performed by: INTERNAL MEDICINE

## 2021-10-12 PROCEDURE — 250N000013 HC RX MED GY IP 250 OP 250 PS 637: Performed by: INTERNAL MEDICINE

## 2021-10-12 PROCEDURE — 36415 COLL VENOUS BLD VENIPUNCTURE: CPT | Performed by: FAMILY MEDICINE

## 2021-10-12 PROCEDURE — 250N000009 HC RX 250: Performed by: INTERNAL MEDICINE

## 2021-10-12 PROCEDURE — 99233 SBSQ HOSP IP/OBS HIGH 50: CPT

## 2021-10-12 PROCEDURE — 85027 COMPLETE CBC AUTOMATED: CPT | Performed by: FAMILY MEDICINE

## 2021-10-12 PROCEDURE — 258N000003 HC RX IP 258 OP 636: Performed by: INTERNAL MEDICINE

## 2021-10-12 RX ADMIN — DEXAMETHASONE 6 MG: 2 TABLET ORAL at 12:25

## 2021-10-12 RX ADMIN — SIMVASTATIN 20 MG: 20 TABLET, FILM COATED ORAL at 22:03

## 2021-10-12 RX ADMIN — ENOXAPARIN SODIUM 40 MG: 100 INJECTION SUBCUTANEOUS at 15:14

## 2021-10-12 RX ADMIN — LAMOTRIGINE 150 MG: 100 TABLET ORAL at 20:19

## 2021-10-12 RX ADMIN — INSULIN GLARGINE 35 UNITS: 100 INJECTION, SOLUTION SUBCUTANEOUS at 08:49

## 2021-10-12 RX ADMIN — REMDESIVIR 100 MG: 100 INJECTION, POWDER, LYOPHILIZED, FOR SOLUTION INTRAVENOUS at 20:24

## 2021-10-12 RX ADMIN — ALBUTEROL SULFATE 2 PUFF: 90 AEROSOL, METERED RESPIRATORY (INHALATION) at 18:14

## 2021-10-12 RX ADMIN — ALBUTEROL SULFATE 2 PUFF: 90 AEROSOL, METERED RESPIRATORY (INHALATION) at 08:51

## 2021-10-12 RX ADMIN — INSULIN ASPART 2 UNITS: 100 INJECTION, SOLUTION INTRAVENOUS; SUBCUTANEOUS at 08:49

## 2021-10-12 RX ADMIN — FUROSEMIDE 20 MG: 20 TABLET ORAL at 08:48

## 2021-10-12 RX ADMIN — METOPROLOL SUCCINATE 25 MG: 25 TABLET, FILM COATED, EXTENDED RELEASE ORAL at 08:48

## 2021-10-12 RX ADMIN — PAROXETINE HYDROCHLORIDE 40 MG: 20 TABLET, FILM COATED ORAL at 22:03

## 2021-10-12 RX ADMIN — ZOLPIDEM TARTRATE 5 MG: 5 TABLET ORAL at 22:03

## 2021-10-12 RX ADMIN — LAMOTRIGINE 150 MG: 100 TABLET ORAL at 08:48

## 2021-10-12 RX ADMIN — FUROSEMIDE 20 MG: 20 TABLET ORAL at 15:14

## 2021-10-12 RX ADMIN — ALBUTEROL SULFATE 2 PUFF: 90 INHALANT RESPIRATORY (INHALATION) at 12:23

## 2021-10-12 RX ADMIN — DOXYCYCLINE HYCLATE 50 MG: 50 CAPSULE ORAL at 08:48

## 2021-10-12 RX ADMIN — SODIUM CHLORIDE 50 ML: 9 INJECTION, SOLUTION INTRAVENOUS at 20:24

## 2021-10-12 RX ADMIN — ENOXAPARIN SODIUM 40 MG: 100 INJECTION SUBCUTANEOUS at 03:26

## 2021-10-12 RX ADMIN — ALBUTEROL SULFATE 2 PUFF: 90 AEROSOL, METERED RESPIRATORY (INHALATION) at 22:32

## 2021-10-12 RX ADMIN — UMECLIDINIUM 1 PUFF: 62.5 AEROSOL, POWDER ORAL at 08:51

## 2021-10-12 ASSESSMENT — MIFFLIN-ST. JEOR: SCORE: 1522.12

## 2021-10-12 ASSESSMENT — ACTIVITIES OF DAILY LIVING (ADL)
ADLS_ACUITY_SCORE: 10

## 2021-10-12 NOTE — PLAN OF CARE
"Patient is a&o, denying any pain. Short of air with activity. Unable to wean down oxygen, as patient o2 saturations remain in low 90's on 75% fio2 at 45L. Nonproductive cough. VS stable. Diminished lung sounds. /67 (BP Location: Right arm)   Pulse 69   Temp 97.4  F (36.3  C) (Oral)   Resp 20   Ht 1.733 m (5' 8.23\")   Wt 81.9 kg (180 lb 8.9 oz)   SpO2 96%   BMI 27.27 kg/m      "

## 2021-10-12 NOTE — PROGRESS NOTES
[facility]    Medicine Progress Note - Hospitalist Service       Date of Admission:  10/8/2021    Assessment & Plan                  Remington Gutierrez is a 75 year old male admitted on 10/8/2021. [facility] with history of COPD, systolic heart failure, S/P AICD, type 2 diabetes, and anxiety/depression. He presented with increasing shortness of breath for 3 or 4 days, especially with activity and lying down, nonproductive cough, and was found to be COVID positive.    # Acute Hypoxic Respiratory Failure secondary to COVID-19 infection  # Viral Pneumonia secondary to COVID-19 infection     Symptom Onset 10/4/2021   Date of 1st Positive Test 10/8/2021   Vaccination Status Partially Vaccinated - 1st dose < 1 week prior to onset of symptoms          Initial chest x-ray demonstrated bilateral interstitial and groundglass opacities consistent with pneumonia.  He initially required oxygen at 5 L/min per nasal cannula, though he came more hypoxemic 10/9/2021, requiring the use of HFNC oxygen, currently 45 L/min and FiO2 0.75, unchanged from yesterday.  He has a little more dyspnea than he did yesterday, particularly dyspnea on exertion.  He indicated to me that he did not think he would be able to make it at home if he were sent home today or soon.  Cough is less frequent and less severe.  He has no respiratory chest pain.  He has been afebrile.    - Continue air on the medical floor under COVID-19 special precautions and with continuous oximetry.  - Oxygen: Continue HFNC 45 L/min with FiO2 0.75, and titrate to keep SpO2 between 90-96%.  - Labs: .0 --> 100.0 --> 50.5 --> 34.3 --> 16.2,  --> 714 --> 637, fibrinogen 633 --> 677 --> 668 --> 650 --> 556, d-Dimer 1.43 --> 1.17 --> 0.67 --> 0.64 --> 0.61.  Trend toward improvement continues, and some values are approaching normal.  Will discontinue daily monitoring.    - Imaging: No additional imaging necessary.  - Breathing  treatments:  Continue albuterol HFA and home tiotropium inhalers as below; avoid nebulizers in favor of MDIs.  - IV fluids: None currently indicated.  - Antibiotics: Not indicated.  - COVID-Focused Medications:   Dexamethasone 6 mg  X 10 days started on 10/8/2021.   Unable to give remdesivir due to GFR < 30 on admission, was eventually started 10/10/2021.  We will plan on a 5-day course IV.  Tocilizumab 600 mg IV given 10/10/2021 after discussion with Dr. Tapia, infectious disease.  - DVT Prophylaxis:   On admission started heparin 5000 units every 8 hours due to renal function, then changed to a low intensity heparin infusion when hypoxemia worsened.  Kidney injury subsequently improved, and the patient was clinically stable in terms of O2 needs, so heparin was discontinued and enoxaparin 0.5 mg/kg subcu every 12 hours started.  Consider anticoag on discharge for 30 days & until return to normal mobility     Acute Kidney Injury  Chronic kidney disease stage 2  Baseline GFR appears to be 60-65.  Admission GFR 29, likely representing acute kidney injury due to volume depletion from acute illness.  He received a 500 mL IV fluid bolus in ED, and preadmission lisinopril, furosemide, and Mobic were held.  There is been a gradual improvement in GFR: 29 --> 33 --> 38 --> 49 --> 60 today, back to his baseline range.  -Lisinopril can be resumed when needed for the control of hypertension, though is not currently needed.  -Preadmission furosemide 20 mg p.o. twice daily has been resumed due to underlying systolic congestive heart failure, see below.  -Hold Mobic until necessary for symptom control.    Possible acute encephalopathy due to COVID-19   Possible mild cognitive impairment/dementia  Noted to have some cognitive impairment on chart review.  Baseline unclear, but sounds like he's had some fluctuating symptoms in the past. Sounds like he was more acutely confused in the ER, pulling out IV's x 2 and removing his  oxygen.  However, at time of admission H+P he was improved and appeared likely at cognitive baseline.  There has been no subsequent confusion or agitation seen, though he can be somewhat repetitive in speech and questions.  -Possibly a cognitive baseline.  Observe.    Chronic Systolic Congestive Heart Failure   Dilated Cardiomyopathy  Essential hypertension   Noted to have history of systolic CHF and dilated cardiomyopathy. [facility]. Non-ishemic cardiomyopathy. ECHO 4/2021 - EF 25-30%. Severe LV dilation. Global hypokinesis. RVSF mildly reduced. Cardiac MRI 2/2021- c/w Non-ishemic cardiomyopathy. Ascending aorta 4.1 cm. Admitted with acute CHF [facility] 9/7-12/2021. Dry weight 202# per [facility] discharge summary 9/2021    On admission BNP elevated 3,907 (similar to 7/2021 ED visit for CHF/COPD). CXR on admission consistent with COVID pneumonia.  On exam today, there is no suggestion of exacerbation of CHF.  Echocardiogram performed 10/11/2021 demonstrated a moderately dilated left ventricle, with severely reduced left ventricular systolic function, ejection fraction 30 to 35%.  Global hypokinesis was seen.  There was also grade 2 or moderate diastolic dysfunction noted.    -Continue preadmission furosemide 20 mg p.o. twice daily.  -Acute kidney injury has improved to an extent that lisinopril can be resumed.  However, the patient's blood pressure has been borderline low.  Consider restarting lisinopril tomorrow if we have room in terms of BP.  -Will hold home metoprolol XL 25 daily due to bradycardia, see below.   -Follow daily weights and I/Os.       Bradycardia  The patient had heart rates reported as low as 38/min overnight.  I am a little surprised to hear that he can achieve heart rates this low as he has a pacer/AICD device in place, which was functioning on admission EKG.  Nonetheless, it does not appear that he had significant associated symptoms.  Bradycardia is probably a result of combined remdesivir and  metoprolol therapy.  Because I would like for him to receive 5 days of remdesivir, we will put a temporary hold on metoprolol.  -Hold metoprolol XL 25 mg daily until remdesivir course is completed.  -Monitor for additional bradycardia while on remdesivir.    COPD    Noted on chart review. PFTs unavailable. Not on home oxygen.  Today's exam suggests mild to moderate expiratory phase prolongation, without wheeze.  -Continued preadmission albuterol scheduled 4 times daily and every 4 hours as needed.  -Continue preadmission tiotropium.      Elevated troponin   Initial troponin 0.107. ECG showed paced rhythm, limiting interpretation. Asymptomatic; patient reported no chest pain prior to admission or since admission.  Suspect this elevation represents strain related to COVID pneumonia and hypoxemic failure.  Next troponins were normal at 0.023 --> 0.019.  -No additional troponin monitoring planned.    Diabetes Mellitus, Type 2  Managed prior to admission with empagliflozin 12.5 mg daily, glipizide 5 mg daily, and metformin 500 mg twice daily.  These outpatient medications have been held since admission. Hemoglobin A1c 7.2 on 10/5/2021.  The patient has been hyperglycemic since dexamethasone was added.  He is currently receiving glargine 35 units subcu every morning, prandial NovoLog 6 units subcu 3 times daily AC, and a NovoLog medium insulin resistance sliding scale.  Glucose control has been trending toward improvement, 132-230 over the last 5 checks.  -Continue glargine 35 units subcu every morning.  -Continue prandial 6 units subcu 3 times daily AC.  -Continue NovoLog medium resistance sliding scale.  -We will hold empagliflozin, glipizide, and Metformin for now.    BENNY  Mild, but severe supine by sleep study 2008. Reports using CPAP at home,   - Using oxygen here for now.    - Avoid supine position while sleeping off CPAP    Anxiety and Depression  Insomnia  Previously diagnosed, and managed prior to admission with  lamotrigine 150 mg twice daily and paroxetine 40 mg p.o. nightly, both continued here.  Mood and affect have been stable this hospital stay.    -Continue preadmission lamotrigine and paroxetine.  -Continue preadmission zolpidem, but decreased dosage to 6.25 mg from prior to admission 12.5 mg at least while here, confirm long-term plan with primary care provider.      Sinus node dysfunction   S/P AICD 5/4/2021  Paced rhythm noted on admission ECG.  I cannot find details in the patient's past medical records as to the properties of his implantable device.  However, it does appear to be a pacemaker based upon the appearance of his admission EKG.  As result, I am a bit surprised to see that he had heart rates as low as 38/min overnight, discussed under bradycardia above.    Rosacea  Continue home doxycycline daily.           Diet: Combination Diet Consistent Carb 75 Grams CHO per Meal Diet; Easy to Chew (level 7); Thin Liquids (level 0)    DVT Prophylaxis: Enoxaparin 0.5 mg subcu every 12 hours.  Rose Catheter: Not present  Central Lines: None  Code Status: Full Code      Disposition Plan   Expected discharge: I anticipate he will be ready for discharge in 3-5 additional hospital days, once oxygen needs are stable, home supplemental oxygen arranged if required, and symptoms are manageable.          Sander Ware MD  Hospitalist Service  [employer]  Securely message with the [employer] Web Console (learn more here)  Text page via [facility] Paging/Directory              ______________________________________________________________________    Interval History   He is having more exertional dyspnea today than he did yesterday.  Rest dyspnea is about the same, and is generally mild.  Cough is mild, and is nonproductive.  He has no respiratory chest pain.  He still has very little appetite.  He does not have his dentures in place because they tend to fall out easily.  As result, he has to chew  solid foods very carefully before swallowing them.  However, he does not wish to switch to a soft diet.    Vitals:    10/10/21 0235 10/12/21 0300   Weight: 84.7 kg (186 lb 11.7 oz) 81.9 kg (180 lb 8.9 oz)     Wt Readings from Last 4 Encounters:   10/12/21 81.9 kg (180 lb 8.9 oz)   07/23/21 99.8 kg (220 lb)   03/02/21 99.8 kg (220 lb)   02/21/20 97.4 kg (214 lb 12.8 oz)         Data reviewed today: I reviewed all medications, new labs and imaging results over the last 24 hours. I personally reviewed no images or EKG's today.    Physical Exam   Vital Signs: Temp: 97.5  F (36.4  C) Temp src: Oral BP: 127/69 Pulse: 80   Resp: 22 SpO2: 94 % O2 Device: High Flow Nasal Cannula (HFNC) Oxygen Delivery: 45 LPM  Weight: 180 lbs 8.91 oz    GENERAL: Pleasant man, seated in bed.  He appears comfortable.  EYES: Eyes grossly normal to inspection, extraocular movements intact  HENT: HFNC in place.  NECK: Trachea midline, no stridor.    RESP: No accessory muscle use.  Lungs are now clear throughout on inspiration.  Expiratory phase is quiet and moderately prolonged, though is clear, no wheezing.   CV: Regular rate and rhythm, non-tachycardic.  Normal S1 S2, no murmur or extra sound.  No lower extremity edema.  ABDOMEN: Soft, non-tender, no guarding.  Liver and spleen not enlarged, no masses palpable.  Bowel sounds positive.  MS: No bony deformities noted.  No red or inflamed joints.  SKIN: Warm and dry, no rashes.  NEURO: Alert, oriented, conversant.  Cranial nerves III - XII grossly intact aside from being hard of hearing.  No gross motor or sensory deficits.  Gait steady, symmetrical.  PSYCH: Calm, alert, conversant.  Able to articulate logical thoughts, no tangential thoughts, no hallucinations or delusions.  Affect normal.        Data     Recent Labs   Lab 10/12/21  1221 10/12/21  0829 10/12/21  0504 10/12/21  0256 10/11/21  2159 10/11/21  1622   * 192* 230* 216* 214* 229*       CMP  Recent Labs   Lab 10/12/21  1228  10/12/21  0829 10/12/21  0504 10/12/21  0256 10/11/21  0233 10/11/21  0215 10/10/21  0753 10/10/21  0459 10/09/21  0752 10/09/21  0521 10/08/21  1614 10/08/21  0442   NA  --   --  139  --   --  136  --  134  --  134  --  130*  130*   POTASSIUM  --   --  4.3  --   --  4.1  --  4.2  --  4.1  --  5.2   CHLORIDE  --   --  109  --   --  103  --  101  --  106  --  97   CO2  --   --  24  --   --  21  --  20  --  20  --  20   ANIONGAP  --   --  6  --   --  12  --  13  --  8  --  13   * 192* 230* 216*   < > 194*   < > 232*   < > 238*   < > 125*   BUN  --   --  55*  --   --  69*  --  68*  --  57*  --  45*   CR  --   --  1.17  --   --  1.38*  --  1.69*  --  1.90*  --  2.10*  2.10*   GFRESTIMATED  --   --  60*  --   --  49*  --  38*  --  33*  --  29*   EHSAN  --   --  9.0  --   --  8.8  --  8.9  --  8.5  --  9.1   PROTTOTAL  --   --   --   --   --   --   --   --   --  7.3  --  7.9   ALBUMIN  --   --   --   --   --   --   --   --   --  3.1*  --  3.8   BILITOTAL  --   --   --   --   --   --   --   --   --  0.4  --  0.8   ALKPHOS  --   --   --   --   --   --   --   --   --  93  --  87   AST  --   --   --   --   --   --   --   --   --  75*  --  108*   ALT  --   --   --   --   --   --   --   --   --  39  --  45    < > = values in this interval not displayed.     CBC  Recent Labs   Lab 10/12/21  0504 10/11/21  0215 10/10/21  0459 10/09/21  0521   WBC 5.7 5.5 8.0 5.7   RBC 4.65 4.66 4.65 4.91   HGB 14.9 15.0 15.1 16.0   HCT 42.7 43.0 43.2 46.4   MCV 92 92 93 95   MCH 32.0 32.2 32.5 32.6   MCHC 34.9 34.9 35.0 34.5   RDW 12.4 12.5 12.6 12.5    200 164 117*     INR  Recent Labs   Lab 10/08/21  1400   INR 1.13       Venous Blood Gas  Recent Labs   Lab 10/08/21  0442   PHV 7.34   PCO2V 43   PO2V 12*   HCO3V 23   MANJINDER -3.0   O2PER 30      Sander Ware MD  [facility]

## 2021-10-12 NOTE — PROGRESS NOTES
Vitals at 0300 were stable except heart rate, 46.  Continuous pulse oximeter showed hr in the 40's.  Pt admitted he was asymptomatic.  Web paged on call MD regarding heart rate.    Heart rate down to 38, and asymptomatic.  Web paged MD.  EKG completed and reviewed by MD.  EKG showed paced with a heart rate of 75.  Will monitor.  Updated day shift RN.

## 2021-10-13 LAB
GLUCOSE BLDC GLUCOMTR-MCNC: 126 MG/DL (ref 70–99)
GLUCOSE BLDC GLUCOMTR-MCNC: 133 MG/DL (ref 70–99)
GLUCOSE BLDC GLUCOMTR-MCNC: 156 MG/DL (ref 70–99)
GLUCOSE BLDC GLUCOMTR-MCNC: 156 MG/DL (ref 70–99)

## 2021-10-13 PROCEDURE — 250N000013 HC RX MED GY IP 250 OP 250 PS 637: Performed by: INTERNAL MEDICINE

## 2021-10-13 PROCEDURE — 250N000013 HC RX MED GY IP 250 OP 250 PS 637: Performed by: FAMILY MEDICINE

## 2021-10-13 PROCEDURE — 120N000001 HC R&B MED SURG/OB

## 2021-10-13 PROCEDURE — 250N000009 HC RX 250: Performed by: INTERNAL MEDICINE

## 2021-10-13 PROCEDURE — 99233 SBSQ HOSP IP/OBS HIGH 50: CPT

## 2021-10-13 PROCEDURE — 999N000157 HC STATISTIC RCP TIME EA 10 MIN

## 2021-10-13 PROCEDURE — 250N000013 HC RX MED GY IP 250 OP 250 PS 637

## 2021-10-13 PROCEDURE — 258N000003 HC RX IP 258 OP 636: Performed by: INTERNAL MEDICINE

## 2021-10-13 PROCEDURE — 250N000012 HC RX MED GY IP 250 OP 636 PS 637

## 2021-10-13 PROCEDURE — 250N000011 HC RX IP 250 OP 636

## 2021-10-13 RX ORDER — RAMELTEON 8 MG/1
8 TABLET ORAL AT BEDTIME
Status: DISCONTINUED | OUTPATIENT
Start: 2021-10-13 | End: 2021-10-14

## 2021-10-13 RX ADMIN — INSULIN ASPART 1 UNITS: 100 INJECTION, SOLUTION INTRAVENOUS; SUBCUTANEOUS at 17:29

## 2021-10-13 RX ADMIN — ENOXAPARIN SODIUM 40 MG: 100 INJECTION SUBCUTANEOUS at 04:48

## 2021-10-13 RX ADMIN — LAMOTRIGINE 150 MG: 100 TABLET ORAL at 20:21

## 2021-10-13 RX ADMIN — RAMELTEON 8 MG: 8 TABLET, FILM COATED ORAL at 22:04

## 2021-10-13 RX ADMIN — FUROSEMIDE 20 MG: 20 TABLET ORAL at 17:26

## 2021-10-13 RX ADMIN — PAROXETINE HYDROCHLORIDE 40 MG: 20 TABLET, FILM COATED ORAL at 22:04

## 2021-10-13 RX ADMIN — SODIUM CHLORIDE 50 ML: 9 INJECTION, SOLUTION INTRAVENOUS at 20:21

## 2021-10-13 RX ADMIN — ALBUTEROL SULFATE 2 PUFF: 90 AEROSOL, METERED RESPIRATORY (INHALATION) at 22:04

## 2021-10-13 RX ADMIN — DOXYCYCLINE HYCLATE 50 MG: 50 CAPSULE ORAL at 07:40

## 2021-10-13 RX ADMIN — REMDESIVIR 100 MG: 100 INJECTION, POWDER, LYOPHILIZED, FOR SOLUTION INTRAVENOUS at 20:21

## 2021-10-13 RX ADMIN — ALBUTEROL SULFATE 2 PUFF: 90 AEROSOL, METERED RESPIRATORY (INHALATION) at 12:22

## 2021-10-13 RX ADMIN — LAMOTRIGINE 150 MG: 100 TABLET ORAL at 07:40

## 2021-10-13 RX ADMIN — DEXAMETHASONE 6 MG: 2 TABLET ORAL at 12:22

## 2021-10-13 RX ADMIN — ENOXAPARIN SODIUM 40 MG: 100 INJECTION SUBCUTANEOUS at 15:01

## 2021-10-13 RX ADMIN — SIMVASTATIN 20 MG: 20 TABLET, FILM COATED ORAL at 22:04

## 2021-10-13 RX ADMIN — ALBUTEROL SULFATE 2 PUFF: 90 AEROSOL, METERED RESPIRATORY (INHALATION) at 07:43

## 2021-10-13 RX ADMIN — UMECLIDINIUM 1 PUFF: 62.5 AEROSOL, POWDER ORAL at 07:43

## 2021-10-13 RX ADMIN — INSULIN GLARGINE 35 UNITS: 100 INJECTION, SOLUTION SUBCUTANEOUS at 07:45

## 2021-10-13 RX ADMIN — ALBUTEROL SULFATE 2 PUFF: 90 AEROSOL, METERED RESPIRATORY (INHALATION) at 17:27

## 2021-10-13 RX ADMIN — FUROSEMIDE 20 MG: 20 TABLET ORAL at 07:41

## 2021-10-13 ASSESSMENT — ACTIVITIES OF DAILY LIVING (ADL)
ADLS_ACUITY_SCORE: 10
ADLS_ACUITY_SCORE: 10
ADLS_ACUITY_SCORE: 11
ADLS_ACUITY_SCORE: 10
ADLS_ACUITY_SCORE: 10
ADLS_ACUITY_SCORE: 11

## 2021-10-13 NOTE — PLAN OF CARE
A&O, remains on 75%FiO2 45 LPM sats 92-96%. Pt requested prn ambien at HS, set off bed alarm, confused, removed HF O2 x 2, redirected. IV SL, denies pain, VSS, blood sugars 269 HS, 156 0200, urinal void. Rested intermittently throughout shift.

## 2021-10-13 NOTE — PLAN OF CARE
"Patient was moderately confused this morning, pulled high flow off of face and stretched device until it was broken, getting out of bed on his own, setting off alarms. Stated he thought he was at a friend's house and was confused \"why I got covid here\", pleasantly redirectable and new high flow placed on patient. 1:1 in place for safety. Patient lung sounds clear/diminished. /65   Pulse 58   Temp 97.7  F (36.5  C) (Oral)   Resp 20   Ht 1.733 m (5' 8.23\")   Wt 81.9 kg (180 lb 8.9 oz)   SpO2 93%   BMI 27.27 kg/m       "

## 2021-10-14 LAB
ALBUMIN SERPL-MCNC: 2.9 G/DL (ref 3.4–5)
ALP SERPL-CCNC: 103 U/L (ref 40–150)
ALT SERPL W P-5'-P-CCNC: 42 U/L (ref 0–70)
ANION GAP SERPL CALCULATED.3IONS-SCNC: 6 MMOL/L (ref 3–14)
AST SERPL W P-5'-P-CCNC: 40 U/L (ref 0–45)
BILIRUB SERPL-MCNC: 0.6 MG/DL (ref 0.2–1.3)
BUN SERPL-MCNC: 48 MG/DL (ref 7–30)
CALCIUM SERPL-MCNC: 9.1 MG/DL (ref 8.5–10.1)
CHLORIDE BLD-SCNC: 110 MMOL/L (ref 94–109)
CO2 SERPL-SCNC: 23 MMOL/L (ref 20–32)
CREAT SERPL-MCNC: 1.29 MG/DL (ref 0.66–1.25)
GFR SERPL CREATININE-BSD FRML MDRD: 53 ML/MIN/1.73M2
GLUCOSE BLD-MCNC: 155 MG/DL (ref 70–99)
GLUCOSE BLDC GLUCOMTR-MCNC: 133 MG/DL (ref 70–99)
GLUCOSE BLDC GLUCOMTR-MCNC: 142 MG/DL (ref 70–99)
GLUCOSE BLDC GLUCOMTR-MCNC: 182 MG/DL (ref 70–99)
GLUCOSE BLDC GLUCOMTR-MCNC: 230 MG/DL (ref 70–99)
GLUCOSE BLDC GLUCOMTR-MCNC: 257 MG/DL (ref 70–99)
HOLD SPECIMEN: NORMAL
POTASSIUM BLD-SCNC: 4.6 MMOL/L (ref 3.4–5.3)
PROT SERPL-MCNC: 6.5 G/DL (ref 6.8–8.8)
SODIUM SERPL-SCNC: 139 MMOL/L (ref 133–144)

## 2021-10-14 PROCEDURE — 258N000003 HC RX IP 258 OP 636: Performed by: INTERNAL MEDICINE

## 2021-10-14 PROCEDURE — 99233 SBSQ HOSP IP/OBS HIGH 50: CPT

## 2021-10-14 PROCEDURE — 250N000013 HC RX MED GY IP 250 OP 250 PS 637: Performed by: FAMILY MEDICINE

## 2021-10-14 PROCEDURE — 250N000011 HC RX IP 250 OP 636

## 2021-10-14 PROCEDURE — 250N000013 HC RX MED GY IP 250 OP 250 PS 637

## 2021-10-14 PROCEDURE — 250N000012 HC RX MED GY IP 250 OP 636 PS 637

## 2021-10-14 PROCEDURE — 250N000009 HC RX 250: Performed by: INTERNAL MEDICINE

## 2021-10-14 PROCEDURE — 120N000001 HC R&B MED SURG/OB

## 2021-10-14 PROCEDURE — 80053 COMPREHEN METABOLIC PANEL: CPT

## 2021-10-14 PROCEDURE — 250N000013 HC RX MED GY IP 250 OP 250 PS 637: Performed by: INTERNAL MEDICINE

## 2021-10-14 PROCEDURE — 36415 COLL VENOUS BLD VENIPUNCTURE: CPT

## 2021-10-14 RX ORDER — LORAZEPAM 1 MG/1
1 TABLET ORAL AT BEDTIME
Status: DISCONTINUED | OUTPATIENT
Start: 2021-10-14 | End: 2021-10-25 | Stop reason: HOSPADM

## 2021-10-14 RX ADMIN — DEXAMETHASONE 6 MG: 2 TABLET ORAL at 12:14

## 2021-10-14 RX ADMIN — UMECLIDINIUM 1 PUFF: 62.5 AEROSOL, POWDER ORAL at 09:04

## 2021-10-14 RX ADMIN — SIMVASTATIN 20 MG: 20 TABLET, FILM COATED ORAL at 22:25

## 2021-10-14 RX ADMIN — DOXYCYCLINE HYCLATE 50 MG: 50 CAPSULE ORAL at 09:01

## 2021-10-14 RX ADMIN — ALBUTEROL SULFATE 2 PUFF: 90 AEROSOL, METERED RESPIRATORY (INHALATION) at 12:12

## 2021-10-14 RX ADMIN — INSULIN ASPART 3 UNITS: 100 INJECTION, SOLUTION INTRAVENOUS; SUBCUTANEOUS at 17:25

## 2021-10-14 RX ADMIN — ENOXAPARIN SODIUM 40 MG: 100 INJECTION SUBCUTANEOUS at 05:27

## 2021-10-14 RX ADMIN — INSULIN GLARGINE 35 UNITS: 100 INJECTION, SOLUTION SUBCUTANEOUS at 09:01

## 2021-10-14 RX ADMIN — INSULIN ASPART 1 UNITS: 100 INJECTION, SOLUTION INTRAVENOUS; SUBCUTANEOUS at 09:05

## 2021-10-14 RX ADMIN — ENOXAPARIN SODIUM 40 MG: 100 INJECTION SUBCUTANEOUS at 17:29

## 2021-10-14 RX ADMIN — SODIUM CHLORIDE 50 ML: 9 INJECTION, SOLUTION INTRAVENOUS at 20:35

## 2021-10-14 RX ADMIN — ALBUTEROL SULFATE 2 PUFF: 90 AEROSOL, METERED RESPIRATORY (INHALATION) at 09:03

## 2021-10-14 RX ADMIN — PAROXETINE HYDROCHLORIDE 40 MG: 20 TABLET, FILM COATED ORAL at 22:25

## 2021-10-14 RX ADMIN — FUROSEMIDE 20 MG: 20 TABLET ORAL at 09:01

## 2021-10-14 RX ADMIN — ALBUTEROL SULFATE 2 PUFF: 90 AEROSOL, METERED RESPIRATORY (INHALATION) at 22:25

## 2021-10-14 RX ADMIN — LORAZEPAM 1 MG: 1 TABLET ORAL at 22:25

## 2021-10-14 RX ADMIN — LAMOTRIGINE 150 MG: 100 TABLET ORAL at 09:01

## 2021-10-14 RX ADMIN — ALBUTEROL SULFATE 2 PUFF: 90 AEROSOL, METERED RESPIRATORY (INHALATION) at 17:23

## 2021-10-14 RX ADMIN — REMDESIVIR 100 MG: 100 INJECTION, POWDER, LYOPHILIZED, FOR SOLUTION INTRAVENOUS at 20:29

## 2021-10-14 RX ADMIN — LAMOTRIGINE 150 MG: 100 TABLET ORAL at 20:30

## 2021-10-14 ASSESSMENT — ACTIVITIES OF DAILY LIVING (ADL)
ADLS_ACUITY_SCORE: 10
ADLS_ACUITY_SCORE: 11
ADLS_ACUITY_SCORE: 12
ADLS_ACUITY_SCORE: 11
ADLS_ACUITY_SCORE: 12
ADLS_ACUITY_SCORE: 12

## 2021-10-14 NOTE — PROGRESS NOTES
[facility]    Medicine Progress Note - Hospitalist Service       Date of Admission:  10/8/2021    Assessment & Plan                  Remington Gutierrez is a 75 year old male admitted on 10/8/2021. [facility] with history of COPD, systolic heart failure, S/P AICD, type 2 diabetes, and anxiety/depression. He presented with increasing shortness of breath for 4 days, especially with activity and lying down, nonproductive cough, and was found to be COVID positive.    # Acute Hypoxic Respiratory Failure secondary to COVID-19 infection  # Viral Pneumonia secondary to COVID-19 infection     Symptom Onset 10/4/2021   Date of 1st Positive Test 10/8/2021   Vaccination Status Partially Vaccinated - 1st dose < 1 week prior to onset of symptoms          Initial chest x-ray demonstrated bilateral interstitial and groundglass opacities consistent with pneumonia.  He initially required oxygen at 5 L/min per nasal cannula, though he came more hypoxemic 10/9/2021, requiring the use of HFNC oxygen.  He remains on HFNC oxygen.  Flow has been increased to 60 L/min, to permit reduction in FiO2, currently at 0.70.  Respiratory symptoms are much the same as yesterday.  He has rest dyspnea, which is usually not bothersome.  Exertional dyspnea remains severe, even with transfer from bed to chair.  Cough is now not bothersome, and is nonproductive.  He has no respiratory chest pain.  He remains afebrile.      - Continue care on the medical floor under COVID-19 special precautions and with continuous oximetry.  - Oxygen: Continue HFNC 60 L/min with FiO2 0.70, and titrate to keep SpO2 between 90-96%.  Although these current oxygen needs would typically prompt ICU transfer, the patient has been clinically stable, and I think we can make decreases in FiO2 this afternoon.  - Labs: .0 --> 100.0 --> 50.5 --> 34.3 --> 16.2,  --> 714 --> 637, fibrinogen 633 --> 677 --> 668 --> 650 --> 556, d-Dimer  1.43 --> 1.17 --> 0.67 --> 0.64 --> 0.61.  Trend toward improvement continues, and some values are approaching normal.  We have discontinued daily monitoring.    - Imaging: No additional imaging necessary.  - Breathing treatments:  Continue albuterol HFA and home tiotropium inhalers as below; avoid nebulizers in favor of MDIs.  - IV fluids: None currently indicated.  - Antibiotics: Not indicated.  - COVID-Focused Medications:   Dexamethasone 6 mg  X 10 days started on 10/8/2021.  We were initially unable to give remdesivir due to GFR < 30 on admission, though remdesivir was eventually started 10/10/2021.  Today (10/14/2021) will complete a 5-day IV course.  Tocilizumab 600 mg IV given 10/10/2021 after discussion with Dr. Tapia, infectious disease.  - DVT Prophylaxis:   On admission started heparin 5000 units every 8 hours due to TANIA, which was then changed to a low intensity heparin infusion when hypoxemia worsened.  Kidney injury subsequently improved, and the patient was clinically stable in terms of O2 needs, so heparin was discontinued and enoxaparin 0.5 mg/kg subcu every 12 hours started and continues.  Consider anticoag on discharge for 30 days & until return to normal mobility     Acute Kidney Injury  Chronic kidney disease stage 2  Baseline GFR appears to be 60-65.  Admission GFR 29, likely representing acute kidney injury due to volume depletion from acute illness.  He received a 500 mL IV fluid bolus in ED, and preadmission lisinopril, furosemide, and Mobic were held.  Furosemide was resumed 10/12/2021. There was a gradual improvement in GFR: 29 --> 33 --> 38 --> 49 --> 60 as of 10/13/2021.  However, GFR 10/14/2021 is back down to 53.  -Lisinopril remains on hold.  -Preadmission furosemide 20 mg p.o. twice daily was resumed 10/12/2021 due to underlying systolic congestive heart failure.  However, given the interval decline in GFR from yesterday, I am once again going to hold furosemide until renal  function stabilizes.  -Continue to hold Mobic until necessary for symptom control.    Acute encephalopathy due to COVID-19 and corticosteroids  Acute toxic encephalopathy due to zolpidem 10/12 - 10/13/2021  Possible mild cognitive impairment/dementia  Noted to have some cognitive impairment on chart review, but not quantified, and no specific diagnosis has been made.  He was more acutely confused in the ER, pulling out IV's x 2 and removing his oxygen.  However, at time of admission H+P he was improved and appeared at cognitive baseline.  There had been no additional confusion or agitation until the early morning hours of 10/13/2021, at which time the patient was noted to be confused, agitated, and taking out his HFNC cannula.  He had gotten zolpidem 6.25 mg p.o. at bedtime, not a new medication for him, though I thought maybe the combination of zolpidem and dexamethasone was enough to cause the morning episode.  I stopped zolpidem 10/13/2021, and that night gave him ramelteon 8 mg p.o. at at bedtime x1.  He says that he slept poorly.  However, even though he did not get zolpidem last night, he is still more confused this morning than previous mornings, easily redirectable verbally, and only mildly agitated (shifting the bed clothes frequently, changing the oxygen tubing from side to side, etc.).  As result, I do not think we can blame zolpidem for his mild encephalopathy.  It is more likely COVID-19 and corticosteroid effect.  -Zolpidem is discontinued.  -Ramelteon 8 mg p.o. was ineffective last night; patient does not wish to continue trying it.  -We will try lorazepam 1 mg p.o. at at bedtime, hoping that that does not worsen encephalopathy or cause delirium.  -Continue to observe mental status.    Chronic Systolic Congestive Heart Failure   Dilated Cardiomyopathy  Noted to have history of systolic CHF and dilated cardiomyopathy. [facility]imal [facility] records. Non-ishemic cardiomyopathy. ECHO 4/2021 - EF 25-30%. Severe LV  dilation. Global hypokinesis. RVSF mildly reduced. Cardiac MRI 2/2021- c/w Non-ishemic cardiomyopathy. Ascending aorta 4.1 cm. Admitted with acute CHF [facility] 9/7-12/2021. Dry weight 202# per [facility] discharge summary 9/2021    On admission BNP elevated 3,907 (similar to 7/2021 ED visit for CHF/COPD). CXR on admission consistent with COVID pneumonia but not CHF.  Echocardiogram performed 10/11/2021 demonstrated a moderately dilated left ventricle, with severely reduced left ventricular systolic function, ejection fraction 30 to 35%.  Global hypokinesis was seen.  There was also grade 2 or moderate diastolic dysfunction noted.  On exam today, there is no suggestion of pulmonary edema, and he has no peripheral edema.  As described above, there has been a slight decline in GFR from yesterday.    -Will once again hold preadmission furosemide 20 mg p.o. twice daily until renal function stabilizes.  -Lisinopril remains on hold.  -Follow daily weights and I/Os.       Essential hypertension   Managed prior to admission with metoprolol XL 25 mg daily and furosemide 20 mg p.o. twice daily.  Metoprolol had to be held 10/12/2021 due to bradycardia while also on remdesivir.  Furosemide was resumed to 10/12/2021, but is being held today, 10/14/2021, due to a mild decline in renal function.   -Observe blood pressure without any antihypertensives for now.    Bradycardia  The patient had heart rates intermittently as low as 38/min.  I am a little surprised to hear that he can achieve heart rates this low as he has a pacer/AICD device in place, which was functioning on admission EKG.  Nonetheless, it does not appear that he has had significant associated symptoms.  Bradycardia is probably a result of combined remdesivir and metoprolol therapy.  Because I would like for him to complete 5 days of remdesivir, I have put a temporary hold on metoprolol.  -Hold metoprolol XL 25 mg daily until remdesivir course is completed.  Today is his last  day.  -Monitor for additional bradycardia while on remdesivir.    COPD    Noted on chart review. PFTs unavailable. Not on home oxygen.  Today's exam suggests mild to moderate expiratory phase prolongation, without wheeze.  -Continued preadmission albuterol scheduled 4 times daily and every 4 hours as needed.  -Continue preadmission tiotropium.      Elevated troponin   Initial troponin 0.107. ECG showed paced rhythm, limiting interpretation. Asymptomatic; patient reported no chest pain prior to admission or since admission.  Suspect this elevation represents strain related to COVID pneumonia and hypoxemic failure.  Next troponins were normal at 0.023 --> 0.019.  -No additional troponin monitoring planned.    Diabetes Mellitus, Type 2  Managed prior to admission with empagliflozin 12.5 mg daily, glipizide 5 mg daily, and metformin 500 mg twice daily.  These outpatient medications have been held since admission. Hemoglobin A1c 7.2 on 10/5/2021.  The patient has been hyperglycemic since dexamethasone was added.  He is currently receiving glargine 35 units subcu every morning, prandial NovoLog 6 units subcu 3 times daily AC, and a NovoLog medium insulin resistance sliding scale.  Glucose control has been generally good, 126 - 182 over the past 5 checks.  -Continue glargine 35 units subcu every morning.  -Continue prandial 6 units subcu 3 times daily AC.  -Continue NovoLog medium resistance sliding scale.  -We will hold empagliflozin, glipizide, and Metformin for now.    BENNY  Mild, but severe supine by sleep study 2008. Reports using CPAP at home,   - Using oxygen here for now.    - Avoid supine position while sleeping off CPAP    Anxiety and Depression  Insomnia  Previously diagnosed, and managed prior to admission with lamotrigine 150 mg twice daily and paroxetine 40 mg p.o. nightly, both continued here.  Mood and affect have been stable this hospital stay.    -Continue preadmission lamotrigine and paroxetine.  -Out of  concern that zolpidem is causing morning agitated delirium, I stopped zolpidem on 10/13/2021, and gave him ramelteon 8 mg p.o. at at bedtime that night.  He slept poorly on ramelteon, and does not wish to continue using it.  We will try lorazepam 1 mg p.o. nightly starting tonight, hoping that it does not exacerbate encephalopathy or cause delirium.    Sinus node dysfunction   S/P AICD 5/4/2021  Paced rhythm noted on admission ECG.  I cannot find details in the patient's past medical records as to the properties of his implantable device.  However, it does appear to be a pacemaker based upon the appearance of his admission EKG.  As result, I am a bit surprised to see that he had heart rates as low as 38/min overnight, discussed under bradycardia above.    Rosacea  Continue home doxycycline daily.           Diet: Combination Diet Consistent Carb 75 Grams CHO per Meal Diet; Easy to Chew (level 7); Thin Liquids (level 0)    DVT Prophylaxis: Enoxaparin 0.5 mg subcu every 12 hours (HFNC).  Rose Catheter: Not present  Central Lines: None  Code Status: Full Code      Disposition Plan   Expected discharge: I anticipate he will be ready for discharge in 3-5 additional hospital days, once oxygen needs are stable, home supplemental oxygen arranged if required, and symptoms are manageable.          Sander Ware MD  Hospitalist Service  [facility]  Securely message with the [employer] Web Console (learn more here)  Text page via [employer] Paging/Directory              ______________________________________________________________________    Interval History   Slept poorly last night, which has made him irritable today.  Respiratory symptoms are mostly unchanged from yesterday.  Rest dyspnea is mild.  Exertional dyspnea is at least moderate.  Cough is mild, and is nonproductive.  Appetite is fairly good.  He denies dysphagia, nausea, or vomiting.  He is getting frustrated at the length of this hospital  stay.    Vitals:    10/10/21 0235 10/12/21 0300   Weight: 84.7 kg (186 lb 11.7 oz) 81.9 kg (180 lb 8.9 oz)     Wt Readings from Last 4 Encounters:   10/12/21 81.9 kg (180 lb 8.9 oz)   07/23/21 99.8 kg (220 lb)   03/02/21 99.8 kg (220 lb)   02/21/20 97.4 kg (214 lb 12.8 oz)         Data reviewed today: I reviewed all medications, new labs and imaging results over the last 24 hours. I personally reviewed no images or EKG's today.    Physical Exam   Vital Signs: Temp: 97.6  F (36.4  C) Temp src: Oral BP: 116/53 Pulse: (!) 42   Resp: 20 SpO2: 96 % O2 Device: High Flow Nasal Cannula (HFNC) Oxygen Delivery: 55 LPM  Weight: 180 lbs 8.91 oz    GENERAL: Pleasant man, seated in bed.  He appears comfortable.  EYES: Eyes grossly normal to inspection, extraocular movements intact  HENT: HFNC in place.  NECK: Trachea midline, no stridor.    RESP: No accessory muscle use.  Lungs are now clear throughout on inspiration.  Expiratory phase is quiet and moderately prolonged, though is clear, no wheezing.   CV: Regular rate and rhythm, non-tachycardic.  Normal S1 S2, no murmur or extra sound.  No lower extremity edema.  ABDOMEN: Soft, non-tender, no guarding.  Liver and spleen not enlarged, no masses palpable.  Bowel sounds positive.  MS: No bony deformities noted.  No red or inflamed joints.  SKIN: Warm and dry, no rashes.  NEURO: Alert, oriented, conversant.  Cranial nerves III - XII grossly intact aside from being hard of hearing.  No gross motor or sensory deficits.  Gait steady, symmetrical.  PSYCH: Alert, conversant.  Able to articulate logical thoughts, no tangential thoughts, no hallucinations or delusions.  He seems irritable this morning, and is a little agitated, frequently repositioning the covers, the location of his oxygen tubing, etc.  When I pointed this out, he was able to acknowledge it, and stop the behavior.        Data     Recent Labs   Lab 10/14/21  0805 10/14/21  0505 10/14/21  0129 10/13/21  1723 10/13/21  1207  10/13/21  0748   * 155* 182* 156* 126* 133*       CMP  Recent Labs   Lab 10/14/21  0805 10/14/21  0505 10/14/21  0129 10/13/21  1723 10/12/21  0829 10/12/21  0504 10/11/21  0233 10/11/21  0215 10/10/21  0753 10/10/21  0459 10/09/21  0752 10/09/21  0521 10/08/21  1614 10/08/21  0442   NA  --  139  --   --   --  139  --  136  --  134   < > 134  --  130*  130*   POTASSIUM  --  4.6  --   --   --  4.3  --  4.1  --  4.2   < > 4.1  --  5.2   CHLORIDE  --  110*  --   --   --  109  --  103  --  101   < > 106  --  97   CO2  --  23  --   --   --  24  --  21  --  20   < > 20  --  20   ANIONGAP  --  6  --   --   --  6  --  12  --  13   < > 8  --  13   * 155* 182* 156*   < > 230*   < > 194*   < > 232*   < > 238*   < > 125*   BUN  --  48*  --   --   --  55*  --  69*  --  68*   < > 57*  --  45*   CR  --  1.29*  --   --   --  1.17  --  1.38*  --  1.69*   < > 1.90*  --  2.10*  2.10*   GFRESTIMATED  --  53*  --   --   --  60*  --  49*  --  38*   < > 33*  --  29*   EHSAN  --  9.1  --   --   --  9.0  --  8.8  --  8.9   < > 8.5  --  9.1   PROTTOTAL  --  6.5*  --   --   --   --   --   --   --   --   --  7.3  --  7.9   ALBUMIN  --  2.9*  --   --   --   --   --   --   --   --   --  3.1*  --  3.8   BILITOTAL  --  0.6  --   --   --   --   --   --   --   --   --  0.4  --  0.8   ALKPHOS  --  103  --   --   --   --   --   --   --   --   --  93  --  87   AST  --  40  --   --   --   --   --   --   --   --   --  75*  --  108*   ALT  --  42  --   --   --   --   --   --   --   --   --  39  --  45    < > = values in this interval not displayed.     CBC  Recent Labs   Lab 10/12/21  0504 10/11/21  0215 10/10/21  0459 10/09/21  0521   WBC 5.7 5.5 8.0 5.7   RBC 4.65 4.66 4.65 4.91   HGB 14.9 15.0 15.1 16.0   HCT 42.7 43.0 43.2 46.4   MCV 92 92 93 95   MCH 32.0 32.2 32.5 32.6   MCHC 34.9 34.9 35.0 34.5   RDW 12.4 12.5 12.6 12.5    200 164 117*     INR  Recent Labs   Lab 10/08/21  1400   INR 1.13       Venous Blood Gas  Recent Labs    Lab 10/08/21  0442   PHV 7.34   PCO2V 43   PO2V 12*   HCO3V 23   MANJINDER -3.0   O2PER 30      Sander Ware MD  [facility]

## 2021-10-14 NOTE — PROGRESS NOTES
"CLINICAL NUTRITION SERVICES - ASSESSMENT NOTE     Nutrition Prescription    RECOMMENDATIONS FOR MDs/PROVIDERS TO ORDER:  None    Malnutrition Status:    Unable to determine due to limited/variable weight history and no physical assessment.    Recommendations already ordered by Registered Dietitian (RD):  -Glucerna any flavor w/ breakfast.    Future/Additional Recommendations:  -Continue to monitor and encourage oral intake.  -Daily weights as ordered.  -Add/adjust ONS as tolerated.     REASON FOR ASSESSMENT  Remington Gutierrez is a/an 75 year old male assessed by the dietitian for [facility]    NUTRITION HISTORY  Obtained from electronic medical record:  -Admitted 10/8/21. Per H&P, [facility] with history of COPD, systolic heart failure takes furosemide, CKD stage 2, S/P AICD, type 2 diabetes, and anxiety/depression. He presented with increasing shortness of breath for 4 days, especially with activity and lying down, nonproductive cough, and was found to be COVID positive.  -Noted to be acutely encephalopathic d/t COVID-19 and corticosteroids.   -Patient lives in a house in [place] on his son Tab's property. His significant other Nathalia lives close.  -Currently on HFNC, 55 LPM FiO2 70%.    Unable to reach patient via telephone. Attempted to reach Nathalia via telephone but unable - brief voicemail was left requesting a call back.    Spoke to Tab via telephone, although he couldn't provide many details.:  -Nathalia generally makes meals for him. He can't have anything with sodium in it or he will gain water weight. His doctor's want as little sodium intake as possible. Patient uses Mrs. Wen. He knows what he should and shouldn't eat.   -Generally he eats PB sandwich or oatmeal for breakfast, or won't eat breakfast, and anything for lunch and dinner.   -Tab is trying to coordinate extra services for after discharge since Nathalia will be gone for a month.  -He doesn't think he was eating \"like he should've been\" " "before admission. He doesn't know any other specifics such as how much he was eating and how long this lasted.    CURRENT NUTRITION ORDERS  Diet: Orders Placed This Encounter      Combination Diet Consistent Carb 75 Grams CHO per Meal Diet; Easy to Chew (level 7); Thin Liquids (level 0)      Intake/Tolerance:  -Per flowsheets, patient was eating 25-50% of meals until 10/12, and has been eating % since. Ate 100% of breakfast this morning.  -Poor appetite noted 10/11/21 in RN note.  -GI: last BM 10/12/21.    LABS  K+ 4.6 (WNL)  BUN 48 (H)  Creatinine 1.29 (H)  GFR 53 (L)    Recent Labs   Lab 10/14/21  1159 10/14/21  0805 10/14/21  0505 10/14/21  0129 10/13/21  1723 10/13/21  1207   * 142* 155* 182* 156* 126*         MEDICATIONS  Dexamethasone  Lasix - resumed 10/9.  Novolog  Lantus      ANTHROPOMETRICS  Height: 173.3 cm (5' 8.228\")  Most Recent Weight: 81.9 kg (180 lb 8.9 oz)    IBW: 70 kg (117% IBW)  BMI: 27.27 kg/m2 Overweight BMI 25-29.9  Weight History:   Wt Readings from Last 10 Encounters:   10/12/21 81.9 kg (180 lb 8.9 oz)   07/23/21 99.8 kg (220 lb)   03/02/21 99.8 kg (220 lb)   Per care everywhere:  198.8 lb 9/10/21  199.1 lb 9/9/21  211.2 lb 9/8/21  217.7 lb 9/7/21    -Tab reports patient doesn't appear to be losing weight. He was recently hospitalized and was diuresed ~ 20 lb in 8 days, per his report.  -Unable to assess weight history. Suspect weights are trending down due to diuresis and may be inaccurately elevated on 3/2/21 and 7/23/21 d/t fluid, but diet recall is also fairly limited and may have a role in his weights.     Current encounter:  Vitals:    10/10/21 0235 10/12/21 0300   Weight: 84.7 kg (186 lb 11.7 oz) 81.9 kg (180 lb 8.9 oz)   Net IO Since Admission: -6,601.5 mL [10/14/21 1408]  -Difficult to assess weight trends this admission given limited recordings. Weight appears to be trending down, likely in the setting of reduced oral intake, hypermetabolism secondary to COVID-19 " infection, and diuresis. Will continue to monitor oral intake and weights and code for malnutrition if appropriate.    Dosing Weight: 82 kg (current weight)    ASSESSED NUTRITION NEEDS  Estimated Energy Needs: 5028-4355 kcals/day (25 - 30 kcals/kg)  Justification: Maintenance  Estimated Protein Needs: 66-82 grams protein/day (0.8 - 1 grams of pro/kg)  Justification: CKD and Maintenance  Estimated Fluid Needs: (1 mL/kcal)   Justification: Per provider pending fluid status    PHYSICAL FINDINGS  Unable to assess due to isolation precautions secondary to COVID-19 infection. RDs not entering rooms to preserve PPE and minimize exposure, per nutrition department guidelines.      MALNUTRITION  % Intake: < 75% for > 7 days (non-severe)  % Weight Loss: Unable to assess  Subcutaneous Fat Loss: Unable to assess  Muscle Loss: Unable to assess  Fluid Accumulation/Edema: None noted  Malnutrition Diagnosis: Unable to determine due to limited/variable weight history and no physical assessment.    NUTRITION DIAGNOSIS  Inadequate oral intake related to poor appetite and increased needs secondary to acute infection as evidenced by oral intake meeting < 75% estimated needs for > 7 days.    INTERVENTIONS  Implementation  Nutrition Education: Unable to complete due to unable to talk to patient at this time.     Collaboration with other providers: patient plan of care discussed during IDT rounds this morning.    Medical food supplement therapy     Goals  Patient to consume % of nutritionally adequate meal trays TID, or the equivalent with supplements/snacks.     Monitoring/Evaluation  Progress toward goals will be monitored and evaluated per protocol.    Verito Phillip RDN, LD  Clinical Dietitian  Office (Monday-Friday): [phone]  Weekend/Holiday Pager: [phone]

## 2021-10-14 NOTE — PROGRESS NOTES
Care Management Follow Up    Length of Stay (days): 6    Expected Discharge Date: 10/18/2021     Concerns to be Addressed: discharge planning     Patient plan of care discussed at interdisciplinary rounds: Yes    Anticipated Discharge Disposition: Home, Home Care     Anticipated Discharge Services: None  Anticipated Discharge DME: Oxygen    Patient/family educated on Medicare website which has current facility and service quality ratings: yes  Education Provided on the Discharge Plan:    Patient/Family in Agreement with the Plan: yes    Referrals Placed by CM/SW: Homecare  Private pay costs discussed: Not applicable    Additional Information:  Spoke with patient's SO, Nathalia via phone.  Attempted to reach patient via phone but unable.  Discussed discharge planning with Nathalia.  She expressed desire for patient to return home with increased home care services (daily if possible).  Encouraged Nathalia to connect directly with [facility] provider to coordinate increased home services.  Discussed with Nathalia, patient's need for ongoing hospitalization, likely 3-5 more days per MD note.      Patient is current with [facility] (Phone: [phone] Fax: [phone])[facility].  Spoke with [facility] intake.  Discussed patient's hospitalization and desire for increased services from family.      CM team will remain in contact with [facility] (Phone: [phone] Fax: [phone]) regarding expected discharge.  MD to place home care orders at discharge.  CM team will continue to follow and assess for additional discharge needs.        PLAN:  Home w/ family support and resumed [facility] (Phone: [phone] Fax: [phone]).  Family working to secure increased VA covered home care services.      Meli Menchaca MSN, RN  Inpatient Care Coordinator  [facility] [phone]  [facility] [phone]

## 2021-10-14 NOTE — PLAN OF CARE
AOx4, up w/SBA and walker to urinal void at bedside. Currently on 70%FiO2 55LPM, sats 92-94%, SOB w/exertion, does dip while sleeping. Denies pain, VSS, IV SL. Rozerem given at HS new last night, per patient he states he didn't sleep well, was awake frequently. Observed patient sleeping throughout shift, although pt states he was awake. Explained to pt O2 needs to be slowly weaned down and this is a process, as he did mention he hoped he could go home today.

## 2021-10-14 NOTE — PROGRESS NOTES
Patient has new reddened rash in between buttocks. He shared this with his son and significant other  (significant other called to update staff) and was upset when writer asked patient to do skin assessment as he has previously declined this. Patient is very modest. Barrier cream applied to skin in buttocks which patient reports decreases the discomfort. Chair cushion provided and patient sitting up in chair which he states is comfortable.

## 2021-10-15 LAB
ANION GAP SERPL CALCULATED.3IONS-SCNC: 10 MMOL/L (ref 3–14)
BUN SERPL-MCNC: 51 MG/DL (ref 7–30)
CALCIUM SERPL-MCNC: 9.4 MG/DL (ref 8.5–10.1)
CHLORIDE BLD-SCNC: 112 MMOL/L (ref 94–109)
CO2 SERPL-SCNC: 15 MMOL/L (ref 20–32)
CREAT SERPL-MCNC: 1.11 MG/DL (ref 0.66–1.25)
CRP SERPL-MCNC: 4.4 MG/L (ref 0–8)
D DIMER PPP FEU-MCNC: 0.85 UG/ML FEU (ref 0–0.5)
ERYTHROCYTE [DISTWIDTH] IN BLOOD BY AUTOMATED COUNT: 12.7 % (ref 10–15)
GFR SERPL CREATININE-BSD FRML MDRD: 64 ML/MIN/1.73M2
GLUCOSE BLD-MCNC: 151 MG/DL (ref 70–99)
GLUCOSE BLDC GLUCOMTR-MCNC: 117 MG/DL (ref 70–99)
GLUCOSE BLDC GLUCOMTR-MCNC: 149 MG/DL (ref 70–99)
GLUCOSE BLDC GLUCOMTR-MCNC: 152 MG/DL (ref 70–99)
GLUCOSE BLDC GLUCOMTR-MCNC: 166 MG/DL (ref 70–99)
GLUCOSE BLDC GLUCOMTR-MCNC: 237 MG/DL (ref 70–99)
HCT VFR BLD AUTO: 51.6 % (ref 40–53)
HGB BLD-MCNC: 17.2 G/DL (ref 13.3–17.7)
LDH SERPL L TO P-CCNC: 660 U/L (ref 85–227)
MCH RBC QN AUTO: 32 PG (ref 26.5–33)
MCHC RBC AUTO-ENTMCNC: 33.3 G/DL (ref 31.5–36.5)
MCV RBC AUTO: 96 FL (ref 78–100)
PLATELET # BLD AUTO: 264 10E3/UL (ref 150–450)
POTASSIUM BLD-SCNC: 4.3 MMOL/L (ref 3.4–5.3)
RBC # BLD AUTO: 5.38 10E6/UL (ref 4.4–5.9)
SODIUM SERPL-SCNC: 137 MMOL/L (ref 133–144)
WBC # BLD AUTO: 7.8 10E3/UL (ref 4–11)

## 2021-10-15 PROCEDURE — 250N000011 HC RX IP 250 OP 636

## 2021-10-15 PROCEDURE — 85027 COMPLETE CBC AUTOMATED: CPT | Performed by: FAMILY MEDICINE

## 2021-10-15 PROCEDURE — 250N000013 HC RX MED GY IP 250 OP 250 PS 637

## 2021-10-15 PROCEDURE — 86140 C-REACTIVE PROTEIN: CPT | Performed by: FAMILY MEDICINE

## 2021-10-15 PROCEDURE — 36415 COLL VENOUS BLD VENIPUNCTURE: CPT

## 2021-10-15 PROCEDURE — 250N000012 HC RX MED GY IP 250 OP 636 PS 637

## 2021-10-15 PROCEDURE — 99233 SBSQ HOSP IP/OBS HIGH 50: CPT | Performed by: FAMILY MEDICINE

## 2021-10-15 PROCEDURE — 85379 FIBRIN DEGRADATION QUANT: CPT | Performed by: FAMILY MEDICINE

## 2021-10-15 PROCEDURE — 250N000013 HC RX MED GY IP 250 OP 250 PS 637: Performed by: FAMILY MEDICINE

## 2021-10-15 PROCEDURE — 250N000009 HC RX 250: Performed by: FAMILY MEDICINE

## 2021-10-15 PROCEDURE — 83615 LACTATE (LD) (LDH) ENZYME: CPT | Performed by: FAMILY MEDICINE

## 2021-10-15 PROCEDURE — 80048 BASIC METABOLIC PNL TOTAL CA: CPT

## 2021-10-15 PROCEDURE — 36415 COLL VENOUS BLD VENIPUNCTURE: CPT | Performed by: FAMILY MEDICINE

## 2021-10-15 PROCEDURE — 120N000001 HC R&B MED SURG/OB

## 2021-10-15 RX ORDER — BRIMONIDINE TARTRATE 2 MG/ML
1 SOLUTION/ DROPS OPHTHALMIC 2 TIMES DAILY
Status: DISCONTINUED | OUTPATIENT
Start: 2021-10-15 | End: 2021-10-25 | Stop reason: HOSPADM

## 2021-10-15 RX ADMIN — ALBUTEROL SULFATE 2 PUFF: 90 AEROSOL, METERED RESPIRATORY (INHALATION) at 17:27

## 2021-10-15 RX ADMIN — PAROXETINE HYDROCHLORIDE 40 MG: 20 TABLET, FILM COATED ORAL at 22:20

## 2021-10-15 RX ADMIN — UMECLIDINIUM 1 PUFF: 62.5 AEROSOL, POWDER ORAL at 09:09

## 2021-10-15 RX ADMIN — LAMOTRIGINE 150 MG: 100 TABLET ORAL at 20:34

## 2021-10-15 RX ADMIN — ENOXAPARIN SODIUM 40 MG: 100 INJECTION SUBCUTANEOUS at 17:27

## 2021-10-15 RX ADMIN — DEXAMETHASONE 6 MG: 2 TABLET ORAL at 13:25

## 2021-10-15 RX ADMIN — INSULIN ASPART 1 UNITS: 100 INJECTION, SOLUTION INTRAVENOUS; SUBCUTANEOUS at 11:34

## 2021-10-15 RX ADMIN — ALBUTEROL SULFATE 2 PUFF: 90 AEROSOL, METERED RESPIRATORY (INHALATION) at 22:20

## 2021-10-15 RX ADMIN — ALBUTEROL SULFATE 2 PUFF: 90 AEROSOL, METERED RESPIRATORY (INHALATION) at 11:33

## 2021-10-15 RX ADMIN — SIMVASTATIN 20 MG: 20 TABLET, FILM COATED ORAL at 22:20

## 2021-10-15 RX ADMIN — LORAZEPAM 1 MG: 1 TABLET ORAL at 22:20

## 2021-10-15 RX ADMIN — ALBUTEROL SULFATE 2 PUFF: 90 AEROSOL, METERED RESPIRATORY (INHALATION) at 09:09

## 2021-10-15 RX ADMIN — LAMOTRIGINE 150 MG: 100 TABLET ORAL at 09:02

## 2021-10-15 RX ADMIN — BRIMONIDINE TARTRATE 1 DROP: 2 SOLUTION OPHTHALMIC at 22:19

## 2021-10-15 RX ADMIN — DOXYCYCLINE HYCLATE 50 MG: 50 CAPSULE ORAL at 09:02

## 2021-10-15 RX ADMIN — INSULIN GLARGINE 35 UNITS: 100 INJECTION, SOLUTION SUBCUTANEOUS at 09:07

## 2021-10-15 RX ADMIN — ENOXAPARIN SODIUM 40 MG: 100 INJECTION SUBCUTANEOUS at 04:26

## 2021-10-15 RX ADMIN — INSULIN ASPART 1 UNITS: 100 INJECTION, SOLUTION INTRAVENOUS; SUBCUTANEOUS at 17:29

## 2021-10-15 ASSESSMENT — ACTIVITIES OF DAILY LIVING (ADL)
ADLS_ACUITY_SCORE: 13
ADLS_ACUITY_SCORE: 13
ADLS_ACUITY_SCORE: 12

## 2021-10-15 NOTE — PLAN OF CARE
Pt remains on HFNC  FIO2 at 70, 55 LPM.  Sats are 94-96% at rest.  He drops to upper 80s with any activity.  Pt has an infrequent cough and is afebrile.  Using IS to 1000.  He denies pain or nausea, voiding adequate amounts.  Pt has been pleasant, uses call light appropriately.

## 2021-10-15 NOTE — PROGRESS NOTES
CLINICAL NUTRITION SERVICES  -  BRIEF NOTE     Nutrition Prescription    RECOMMENDATIONS FOR MDs/PROVIDERS TO ORDER:  None    Malnutrition Status:    Non-severe malnutrition in the context of acute illness.    Recommendations already ordered by Registered Dietitian (RD):  None at this time.    Future/Additional Recommendations:  -Continue to monitor and encourage oral intake.  -Daily weights as ordered.  -Add/adjust ONS as tolerated.       NUTRITION HISTORY  Spoke to Nathalia via telephone:  -Watches the sugar and salt intake. Uses a lot of Mrs. Wen. They've previously had to watch his potassium d/t it being high. Normal intake is 3 meals/day. He doesn't eat right away in the morning. They tried Meals on Wheels last month but they couldn't come up with enough meals d/t restrictions.  -She believes he wasn't eating quite like normal before admission.  -[facility] He keeps a daily record of his weight. His UBW ranges from 196-200 lb.      Wt Readings from Last 10 Encounters:  10/12/21 81.9 kg (180 lb 8.9 oz)  07/23/21 99.8 kg (220 lb)  03/02/21 99.8 kg (220 lb)  Per care everywhere:  198.8 lb           9/10/21 - appears to be within normal weight range.  199.1 lb           9/9/21  211.2 lb           9/8/21  217.7 lb           9/7/21  -Appears patient has lost 18 lb, 9% body weight, in the last 1 month, which is clinically significant. This is likely in the setting of reduced oral intake, hypermetabolism secondary to COVID-19 infection, and diuresis.     MALNUTRITION  % Intake: < 75% for > 7 days (non-severe)  % Weight Loss: > 5% in 1 month (severe)  Subcutaneous Fat Loss: Unable to assess  Muscle Loss: Unable to assess  Fluid Accumulation/Edema: None noted  Malnutrition Diagnosis: Non-severe malnutrition in the context of acute illness.      INTERVENTIONS  Implementation  Medical food supplement therapy - continue Glucerna as ordered. Discussed possibility of sending Gelatein 20 (sugar free,  high protein jello) w/ Nathalia, but due to thickened consistency of product she believes he may not like it. This option is lower in sugar and sodium compared to Glucerna.      Monitoring/Evaluation  Progress toward goals will be monitored and evaluated per protocol.        Verito Phillip RDN, LD  Clinical Dietitian  Office (Monday-Friday): [phone]  Weekend/Holiday Pager: [phone]

## 2021-10-15 NOTE — PROGRESS NOTES
[facility] Hospitalist Progress Note           Assessment & Plan        Remington Gutierrez is a 75 year old male admitted on 10/8/2021. [facility] with history of COPD, systolic heart failure, S/P AICD, type 2 diabetes, and anxiety/depression. He presented with increasing shortness of breath for 4 days, especially with activity and lying down, nonproductive cough, and was found to be COVID positive.     # Acute Hypoxic Respiratory Failure secondary to COVID-19 infection  # Viral Pneumonia secondary to COVID-19 infection     Symptom Onset 10/4/2021   Date of 1st Positive Test 10/8/2021   Vaccination Status Partially Vaccinated - 1st dose < 1 week prior to onset of symptoms             Initial chest x-ray demonstrated bilateral interstitial and groundglass opacities consistent with pneumonia.  He initially required oxygen at 5 L/min per nasal cannula, though he came more hypoxemic 10/9/2021, requiring the use of HFNC oxygen.  He remains on HFNC oxygen.  Flow has been increased to a peak of 60 L/min 10/14, down to 55 with FiO2 70% 10/15.   breathing slightly improved 10/15, exertional dyspnea remains fairly severe.  Cough is now not bothersome, and is nonproductive.  He has no respiratory chest pain.  He remains afebrile.        - Continue care on the medical floor under COVID-19 special precautions and with continuous oximetry.  - Oxygen: 10/14/21 -- Continue HFNC 60 L/min with FiO2 0.70, and titrate to keep SpO2 between 90-96%.  Although these current oxygen needs would typically prompt ICU transfer, the patient has been clinically stable.  10/15/2021 -- 55 at 70%, patient remains stable and with improving labs and stable respiratory status in the picture of no [facility] continue med-surg status for now.     - Labs: .0 --> 100.0 --> 50.5 --> 34.3 --> 16.2 --> 4.4,  --> 714 --> 637 --> 660, fibrinogen 633 --> 677 --> 668 --> 650 --> 556,  d-Dimer 1.43 --> 1.17 --> 0.67 --> 0.64 --> 0.61 --> 0.85.  Trend toward improvement continues, and some values are approaching normal.  We have discontinued daily monitoring.    - Imaging: No additional imaging necessary.  - Breathing treatments:  Continue albuterol HFA and home tiotropium inhalers as below; avoid nebulizers in favor of MDIs.  - IV fluids: None currently indicated.  - Antibiotics: Not indicated.  - COVID-Focused Medications:   Dexamethasone 6 mg  X 10 days started on 10/8/2021. remdesivir was eventually started 10/10/2021 once renal function improved.  Completed 5 day course.  Tocilizumab 600 mg IV given 10/10/2021 after discussion with Dr. Tapia, infectious disease.  - DVT Prophylaxis:   On admission started heparin 5000 units every 8 hours due to TANIA, which was then changed to a low intensity heparin infusion when hypoxemia worsened.  Kidney injury subsequently improved, and the patient was clinically stable in terms of O2 needs, so heparin was discontinued and enoxaparin 0.5 mg/kg subcu every 12 hours started and continues.  Consider anticoag on discharge for 30 days & until return to normal mobility    repeating chest x-ray and ABG AM 10/16    Acute Kidney Injury  Chronic kidney disease stage 2  Baseline GFR appears to be 60-65.  Admission GFR 29, likely representing acute kidney injury due to volume depletion from acute illness.  He received a 500 mL IV fluid bolus in ED, and preadmission lisinopril, furosemide, and Mobic were held.  Furosemide was resumed 10/12/2021. There was a gradual improvement in GFR: 29 --> 33 --> 38 --> 49 --> 60 -->64 as of 10/15/2021.    -Lisinopril held, reassess 10/16   -Preadmission furosemide 20 mg p.o. twice daily was resumed 10/12/2021 due to underlying systolic congestive heart failure.  However, creatinine worsened with this so was again held 10/14.  Reassess lasix 10/16.      -Continue to hold Mobic until necessary for symptom control.     Acute  encephalopathy due to COVID-19 and corticosteroids  Acute toxic encephalopathy due to zolpidem 10/12 - 10/13/2021  Possible mild cognitive impairment/dementia  Noted to have some cognitive impairment on chart review, but not quantified, and no specific diagnosis has been made.  He was more acutely confused in the ER, pulling out IV's x 2 and removing his oxygen.  However, at time of admission H+P he was improved and appeared at cognitive baseline.  There had been no additional confusion or agitation until the early morning hours of 10/13/2021, at which time the patient was noted to be confused, agitated, and taking out his HFNC cannula.  He had gotten zolpidem 6.25 mg p.o. at bedtime, not a new medication for him, though I thought maybe the combination of zolpidem and dexamethasone was enough to cause the morning episode.  I stopped zolpidem 10/13/2021, and that night gave him ramelteon 8 mg p.o. at at bedtime x1.  He says that he slept poorly.  However, even though he did not get zolpidem last night, he is still more confused this morning than previous mornings, easily redirectable verbally, and only mildly agitated (shifting the bed clothes frequently, changing the oxygen tubing from side to side, etc.).  As result, I do not think we can blame zolpidem for his mild encephalopathy.  It is more likely COVID-19 and corticosteroid effect.  -Zolpidem was discontinued.  -Ramelteon 8 mg p.o. was ineffective last night; patient does not wish to continue trying it.  -tried lorazepam 1 mg p.o. at at bedtime 10/14 and patient did much better with this.   Continued for now.    -encephalopathy appears resolved 10/15. Follow.       Chronic Systolic Congestive Heart Failure   Dilated Cardiomyopathy  Noted to have history of systolic CHF and dilated cardiomyopathy. [facility]imal [facility]. Non-ishemic cardiomyopathy. ECHO 4/2021 - EF 25-30%. Severe LV dilation. Global hypokinesis. RVSF mildly reduced. Cardiac MRI 2/2021- c/w  Non-ishemic cardiomyopathy. Ascending aorta 4.1 cm. Admitted with acute CHF [facility] 9/7-12/2021. Dry weight 202# per [facility] discharge summary 9/2021     On admission BNP elevated 3,907 (similar to 7/2021 ED visit for CHF/COPD). CXR on admission consistent with COVID pneumonia but not CHF.  Echocardiogram performed 10/11/2021 demonstrated a moderately dilated left ventricle, with severely reduced left ventricular systolic function, ejection fraction 30 to 35%.  Global hypokinesis was seen.  There was also grade 2 or moderate diastolic dysfunction noted.  On exam 10/14, there is no suggestion of pulmonary edema, and he has no peripheral edema.  As described above, there has been a slight decline in GFR from 10/13=10/14.  once again held preadmission furosemide 20 mg p.o. twice daily as a result.     -Lisinopril remains on hold. Checking AM chest x-ray 10/16, reassess lasix and lisinopril 10/16.      -Follow daily weights and I/Os.        Essential hypertension   Managed prior to admission with metoprolol XL 25 mg daily and furosemide 20 mg p.o. twice daily.  Metoprolol had to be held 10/12/2021 due to bradycardia while also on remdesivir.  Furosemide was resumed 10/12/2021, held 10/14/2021 due to a mild decline in renal function.    Blood pressure stable so far.      Bradycardia  The patient had heart rates intermittently as low as 38/min.  I am a little surprised to hear that he can achieve heart rates this low as he has a pacer/AICD device in place, which was functioning on admission EKG.  Nonetheless, it does not appear that he has had significant associated symptoms.  Bradycardia is probably a result of combined remdesivir and metoprolol therapy.  Because I would like for him to complete 5 days of remdesivir, I have put a temporary hold on metoprolol.  -Hold metoprolol XL 25 mg daily while on remdesivir - completed 10/14. Heart rate still in 40's 10/15.  Consider resuming metoprolol 10/16 depending on heart rate.         COPD    Noted on chart review. PFTs unavailable. Not on home oxygen.  exam suggests mild to moderate expiratory phase prolongation, without wheeze.  -Continued preadmission albuterol scheduled 4 times daily and every 4 hours as needed.  -Continue preadmission tiotropium.  - on dexamethasone as above.         Elevated troponin - suspect strain (Type 2 MI) due to COVID infection  Initial troponin 0.107. ECG showed paced rhythm, limiting interpretation. Asymptomatic; patient reported no chest pain prior to admission or since admission.  Suspect this elevation represents strain related to COVID pneumonia and hypoxemic failure.  Next troponins were normal at 0.023 --> 0.019.  -No additional troponin monitoring planned.     Diabetes Mellitus, Type 2  Managed prior to admission with empagliflozin 12.5 mg daily, glipizide 5 mg daily, and metformin 500 mg twice daily.  These outpatient medications have been held since admission. Hemoglobin A1c 7.2 on 10/5/2021.  The patient has been hyperglycemic since dexamethasone was added.  He is currently receiving glargine 35 units subcu every morning, prandial NovoLog 6 units subcu 3 times daily AC, and a NovoLog medium insulin resistance sliding scale.  Glucose control has been generally good, 126 - 182 over the past 5 checks.  -Continue glargine 35 units subcu every morning.  -Continue prandial 6 units subcu 3 times daily AC.  -Continue NovoLog medium resistance sliding scale.  -We will hold empagliflozin, glipizide, and Metformin for now.  - reasonable control on this so far.       BENNY  Mild, but severe supine by sleep study 2008. Reports using CPAP at home   - Using oxygen here for now.    - Avoid supine position while sleeping off CPAP     Anxiety and Depression  Insomnia  Previously diagnosed, and managed prior to admission with lamotrigine 150 mg twice daily and paroxetine 40 mg p.o. nightly, both continued here.  Mood and affect have been stable this hospital stay.     -Continue preadmission lamotrigine and paroxetine.  -Out of concern that zolpidem is causing morning agitated delirium, I stopped zolpidem on 10/13/2021, and gave him ramelteon 8 mg p.o. at at bedtime that night.  He slept poorly on ramelteon, and does not wish to continue using it.  did markedly better on lorazepam 1 mg p.o. nightly, with encephalopathy resolved as above.       Sinus node dysfunction   S/P AICD 5/4/2021  Paced rhythm noted on admission ECG.  I cannot find details in the patient's past medical records as to the properties of his implantable device.  However, it does appear to be a pacemaker based upon the appearance of his admission EKG.  As result, I am a bit surprised to see that he had heart rates as low as 38/min overnight, discussed under bradycardia above.     Rosacea  Continue home doxycycline daily.      DVT Prophylaxis: Enoxaparin 0.5 mg subcu every 12 hours (HFNC).  Rose Catheter: Not present  Central Lines: None  Code Status: Full Code      Diet  Orders Placed This Encounter      Combination Diet Consistent Carb 75 Grams CHO per Meal Diet; Easy to Chew (level 7); Thin Liquids (level 0)          Disposition  Continue med-surg as above, although patient normally would be ICU.  Appears stable with this so far.              Interval History:   Patient feels slightly better.  No pain.  Breathing feels good at rest on high flow, sats good with this, but drops still with activity and feels more short of breath then.  No fever or chills.  No new or worsening symptoms.    No other pain             Review of Systems:    ROS: 10 point ROS neg other than the symptoms noted above in the HPI.           Medications:   Current active medications and PTA medications reviewed, see medication list for details.            Physical Exam:   Vitals were reviewed  Patient Vitals for the past 24 hrs:   BP Temp Temp src Pulse Resp SpO2   10/15/21 1137 110/52 -- -- (!) 45 18 94 %   10/15/21 0825 -- -- -- (!) 42  -- --   10/15/21 0822 133/68 97.3  F (36.3  C) Oral (!) 41 18 98 %   10/15/21 0413 (!) 143/69 -- -- -- 18 92 %   10/14/21 1830 105/62 98.1  F (36.7  C) Oral 71 20 98 %   10/14/21 1623 107/74 97.5  F (36.4  C) Oral 67 18 94 %   10/14/21 1609 -- -- -- -- -- 95 %       Temperatures:  Current - Temp: 97.3  F (36.3  C); Max - Temp  Av.6  F (36.4  C)  Min: 97.3  F (36.3  C)  Max: 98.1  F (36.7  C)  Respiration range: Resp  Av.4  Min: 18  Max: 20  Pulse range: Pulse  Av.2  Min: 41  Max: 71  Blood pressure range: Systolic (24hrs), Av , Min:105 , Max:143   ; Diastolic (24hrs), Av, Min:52, Max:74    Pulse oximetry range: SpO2  Av.2 %  Min: 92 %  Max: 98 %  I/O last 3 completed shifts:  In: 400 [P.O.:400]  Out: 1800 [Urine:1600; Emesis/NG output:200]    Intake/Output Summary (Last 24 hours) at 10/15/2021 1549  Last data filed at 10/15/2021 1300  Gross per 24 hour   Intake 400 ml   Output 1600 ml   Net -1200 ml     EXAM:  General: awake and alert, NAD, oriented x 3  Head: normocephalic  Neck: unremarkable, no lymphadenopathy   HEENT: oropharynx pink and moist    Heart: Regular rate and rhythm, no murmurs, rubs, or gallops  Lungs: clear to auscultation bilaterally with good air movement throughout  Abdomen: soft, non-tender, no masses or organomegaly  Extremities: no edema in lower extremities   Skin unremarkable.               Data:     Results for orders placed or performed during the hospital encounter of 10/08/21 (from the past 24 hour(s))   Glucose by meter   Result Value Ref Range    GLUCOSE BY METER POCT 257 (H) 70 - 99 mg/dL   Glucose by meter   Result Value Ref Range    GLUCOSE BY METER POCT 230 (H) 70 - 99 mg/dL   Glucose by meter   Result Value Ref Range    GLUCOSE BY METER POCT 152 (H) 70 - 99 mg/dL   Basic metabolic panel   Result Value Ref Range    Sodium 137 133 - 144 mmol/L    Potassium 4.3 3.4 - 5.3 mmol/L    Chloride 112 (H) 94 - 109 mmol/L    Carbon Dioxide (CO2) 15 (L) 20 - 32  mmol/L    Anion Gap 10 3 - 14 mmol/L    Urea Nitrogen 51 (H) 7 - 30 mg/dL    Creatinine 1.11 0.66 - 1.25 mg/dL    Calcium 9.4 8.5 - 10.1 mg/dL    Glucose 151 (H) 70 - 99 mg/dL    GFR Estimate 64 >60 mL/min/1.73m2   Glucose by meter   Result Value Ref Range    GLUCOSE BY METER POCT 117 (H) 70 - 99 mg/dL   CRP inflammation   Result Value Ref Range    CRP Inflammation 4.4 0.0 - 8.0 mg/L   CBC with platelets   Result Value Ref Range    WBC Count 7.8 4.0 - 11.0 10e3/uL    RBC Count 5.38 4.40 - 5.90 10e6/uL    Hemoglobin 17.2 13.3 - 17.7 g/dL    Hematocrit 51.6 40.0 - 53.0 %    MCV 96 78 - 100 fL    MCH 32.0 26.5 - 33.0 pg    MCHC 33.3 31.5 - 36.5 g/dL    RDW 12.7 10.0 - 15.0 %    Platelet Count 264 150 - 450 10e3/uL   D dimer quantitative   Result Value Ref Range    D-Dimer Quantitative 0.85 (H) 0.00 - 0.50 ug/mL FEU    Narrative    This D-dimer assay is intended for use in conjunction with a clinical pretest probability assessment model to exclude pulmonary embolism (PE) and deep venous thrombosis (DVT) in outpatients suspected of PE or DVT. The cut-off value is 0.50 ug/mL FEU.   Lactate Dehydrogenase   Result Value Ref Range    Lactate Dehydrogenase 660 (H) 85 - 227 U/L   Glucose by meter   Result Value Ref Range    GLUCOSE BY METER POCT 166 (H) 70 - 99 mg/dL           Attestation:  I have reviewed today's vital signs, notes, medications, labs and imaging.  Amount of time spent in direct patient care: 40 minutes.     Morgan Rollins MD, MD

## 2021-10-15 NOTE — PLAN OF CARE
AOx4, up w/SBA to urinal void. Remains on HF 70%FiO2 55LPM, sats 91-96%, does dip w/activity. Denies pain, IV SL, VSS, blood sugars 230 HS; 152 0200. Barrier cream to reddened, sore buttocks. New ativan at HS for sleep had better results than previous Rozerem. Patient rested intermittently throughout shift.

## 2021-10-16 ENCOUNTER — APPOINTMENT (OUTPATIENT)
Dept: CT IMAGING | Facility: CLINIC | Age: 76
DRG: 177 | End: 2021-10-16
Attending: FAMILY MEDICINE
Payer: COMMERCIAL

## 2021-10-16 ENCOUNTER — APPOINTMENT (OUTPATIENT)
Dept: GENERAL RADIOLOGY | Facility: CLINIC | Age: 76
DRG: 177 | End: 2021-10-16
Attending: FAMILY MEDICINE
Payer: COMMERCIAL

## 2021-10-16 LAB
ANION GAP SERPL CALCULATED.3IONS-SCNC: 7 MMOL/L (ref 3–14)
BASE EXCESS BLDA CALC-SCNC: -3.6 MMOL/L (ref -9–1.8)
BUN SERPL-MCNC: 50 MG/DL (ref 7–30)
CALCIUM SERPL-MCNC: 9.4 MG/DL (ref 8.5–10.1)
CHLORIDE BLD-SCNC: 110 MMOL/L (ref 94–109)
CO2 SERPL-SCNC: 20 MMOL/L (ref 20–32)
CREAT SERPL-MCNC: 1.09 MG/DL (ref 0.66–1.25)
CRP SERPL-MCNC: 3 MG/L (ref 0–8)
D DIMER PPP FEU-MCNC: 0.74 UG/ML FEU (ref 0–0.5)
ERYTHROCYTE [DISTWIDTH] IN BLOOD BY AUTOMATED COUNT: 12.5 % (ref 10–15)
GFR SERPL CREATININE-BSD FRML MDRD: 65 ML/MIN/1.73M2
GLUCOSE BLD-MCNC: 124 MG/DL (ref 70–99)
GLUCOSE BLDC GLUCOMTR-MCNC: 116 MG/DL (ref 70–99)
GLUCOSE BLDC GLUCOMTR-MCNC: 140 MG/DL (ref 70–99)
GLUCOSE BLDC GLUCOMTR-MCNC: 153 MG/DL (ref 70–99)
GLUCOSE BLDC GLUCOMTR-MCNC: 58 MG/DL (ref 70–99)
GLUCOSE BLDC GLUCOMTR-MCNC: 88 MG/DL (ref 70–99)
HCO3 BLD-SCNC: 18 MMOL/L (ref 21–28)
HCT VFR BLD AUTO: 46.8 % (ref 40–53)
HGB BLD-MCNC: 16.3 G/DL (ref 13.3–17.7)
MCH RBC QN AUTO: 32.3 PG (ref 26.5–33)
MCHC RBC AUTO-ENTMCNC: 34.8 G/DL (ref 31.5–36.5)
MCV RBC AUTO: 93 FL (ref 78–100)
O2/TOTAL GAS SETTING VFR VENT: 70 %
PCO2 BLD: 25 MM HG (ref 35–45)
PH BLD: 7.47 [PH] (ref 7.35–7.45)
PLATELET # BLD AUTO: 270 10E3/UL (ref 150–450)
PO2 BLD: 60 MM HG (ref 80–105)
POTASSIUM BLD-SCNC: 4.4 MMOL/L (ref 3.4–5.3)
PROCALCITONIN SERPL-MCNC: <0.05 NG/ML
RADIOLOGIST FLAGS: NORMAL
RBC # BLD AUTO: 5.04 10E6/UL (ref 4.4–5.9)
SODIUM SERPL-SCNC: 137 MMOL/L (ref 133–144)
WBC # BLD AUTO: 9.9 10E3/UL (ref 4–11)

## 2021-10-16 PROCEDURE — 250N000013 HC RX MED GY IP 250 OP 250 PS 637: Performed by: FAMILY MEDICINE

## 2021-10-16 PROCEDURE — 80048 BASIC METABOLIC PNL TOTAL CA: CPT | Performed by: FAMILY MEDICINE

## 2021-10-16 PROCEDURE — 250N000011 HC RX IP 250 OP 636: Performed by: FAMILY MEDICINE

## 2021-10-16 PROCEDURE — 999N000215 HC STATISTIC HFNC ADULT NON-CPAP

## 2021-10-16 PROCEDURE — 86140 C-REACTIVE PROTEIN: CPT | Performed by: FAMILY MEDICINE

## 2021-10-16 PROCEDURE — 250N000011 HC RX IP 250 OP 636

## 2021-10-16 PROCEDURE — 85027 COMPLETE CBC AUTOMATED: CPT | Performed by: FAMILY MEDICINE

## 2021-10-16 PROCEDURE — 250N000012 HC RX MED GY IP 250 OP 636 PS 637

## 2021-10-16 PROCEDURE — 36600 WITHDRAWAL OF ARTERIAL BLOOD: CPT

## 2021-10-16 PROCEDURE — 84145 PROCALCITONIN (PCT): CPT | Performed by: FAMILY MEDICINE

## 2021-10-16 PROCEDURE — 200N000001 HC R&B ICU

## 2021-10-16 PROCEDURE — 71045 X-RAY EXAM CHEST 1 VIEW: CPT

## 2021-10-16 PROCEDURE — 36415 COLL VENOUS BLD VENIPUNCTURE: CPT | Performed by: FAMILY MEDICINE

## 2021-10-16 PROCEDURE — 250N000013 HC RX MED GY IP 250 OP 250 PS 637

## 2021-10-16 PROCEDURE — 71275 CT ANGIOGRAPHY CHEST: CPT

## 2021-10-16 PROCEDURE — 99291 CRITICAL CARE FIRST HOUR: CPT | Performed by: FAMILY MEDICINE

## 2021-10-16 PROCEDURE — 82803 BLOOD GASES ANY COMBINATION: CPT | Performed by: FAMILY MEDICINE

## 2021-10-16 PROCEDURE — 85379 FIBRIN DEGRADATION QUANT: CPT | Performed by: FAMILY MEDICINE

## 2021-10-16 PROCEDURE — 250N000009 HC RX 250: Performed by: FAMILY MEDICINE

## 2021-10-16 RX ORDER — DEXTROSE MONOHYDRATE 25 G/50ML
25-50 INJECTION, SOLUTION INTRAVENOUS
Status: DISCONTINUED | OUTPATIENT
Start: 2021-10-16 | End: 2021-10-16

## 2021-10-16 RX ORDER — AMOXICILLIN 250 MG
1 CAPSULE ORAL 2 TIMES DAILY PRN
Status: DISCONTINUED | OUTPATIENT
Start: 2021-10-16 | End: 2021-10-16

## 2021-10-16 RX ORDER — AMOXICILLIN 250 MG
2 CAPSULE ORAL 2 TIMES DAILY PRN
Status: DISCONTINUED | OUTPATIENT
Start: 2021-10-16 | End: 2021-10-16

## 2021-10-16 RX ORDER — NICOTINE POLACRILEX 4 MG
15-30 LOZENGE BUCCAL
Status: DISCONTINUED | OUTPATIENT
Start: 2021-10-16 | End: 2021-10-16

## 2021-10-16 RX ORDER — ONDANSETRON 4 MG/1
4 TABLET, ORALLY DISINTEGRATING ORAL EVERY 6 HOURS PRN
Status: DISCONTINUED | OUTPATIENT
Start: 2021-10-16 | End: 2021-10-16

## 2021-10-16 RX ORDER — FUROSEMIDE 10 MG/ML
20 INJECTION INTRAMUSCULAR; INTRAVENOUS 2 TIMES DAILY
Status: DISCONTINUED | OUTPATIENT
Start: 2021-10-16 | End: 2021-10-17

## 2021-10-16 RX ORDER — ONDANSETRON 2 MG/ML
4 INJECTION INTRAMUSCULAR; INTRAVENOUS EVERY 6 HOURS PRN
Status: DISCONTINUED | OUTPATIENT
Start: 2021-10-16 | End: 2021-10-16

## 2021-10-16 RX ORDER — IOPAMIDOL 755 MG/ML
79 INJECTION, SOLUTION INTRAVASCULAR ONCE
Status: COMPLETED | OUTPATIENT
Start: 2021-10-16 | End: 2021-10-16

## 2021-10-16 RX ADMIN — ALBUTEROL SULFATE 2 PUFF: 90 AEROSOL, METERED RESPIRATORY (INHALATION) at 19:47

## 2021-10-16 RX ADMIN — FUROSEMIDE 20 MG: 10 INJECTION, SOLUTION INTRAMUSCULAR; INTRAVENOUS at 16:09

## 2021-10-16 RX ADMIN — BRIMONIDINE TARTRATE 1 DROP: 2 SOLUTION OPHTHALMIC at 19:59

## 2021-10-16 RX ADMIN — DEXAMETHASONE 6 MG: 2 TABLET ORAL at 16:08

## 2021-10-16 RX ADMIN — SIMVASTATIN 20 MG: 20 TABLET, FILM COATED ORAL at 19:49

## 2021-10-16 RX ADMIN — UMECLIDINIUM 1 PUFF: 62.5 AEROSOL, POWDER ORAL at 09:42

## 2021-10-16 RX ADMIN — ALBUTEROL SULFATE 2 PUFF: 90 AEROSOL, METERED RESPIRATORY (INHALATION) at 09:42

## 2021-10-16 RX ADMIN — ENOXAPARIN SODIUM 40 MG: 100 INJECTION SUBCUTANEOUS at 16:08

## 2021-10-16 RX ADMIN — BRIMONIDINE TARTRATE 1 DROP: 2 SOLUTION OPHTHALMIC at 09:44

## 2021-10-16 RX ADMIN — ACETAMINOPHEN 650 MG: 325 TABLET, FILM COATED ORAL at 16:16

## 2021-10-16 RX ADMIN — DOXYCYCLINE HYCLATE 50 MG: 50 CAPSULE ORAL at 09:37

## 2021-10-16 RX ADMIN — LORAZEPAM 1 MG: 1 TABLET ORAL at 19:48

## 2021-10-16 RX ADMIN — FUROSEMIDE 20 MG: 10 INJECTION, SOLUTION INTRAMUSCULAR; INTRAVENOUS at 19:52

## 2021-10-16 RX ADMIN — PAROXETINE HYDROCHLORIDE 40 MG: 20 TABLET, FILM COATED ORAL at 19:49

## 2021-10-16 RX ADMIN — SODIUM CHLORIDE 100 ML: 9 INJECTION, SOLUTION INTRAVENOUS at 10:41

## 2021-10-16 RX ADMIN — IOPAMIDOL 79 ML: 755 INJECTION, SOLUTION INTRAVENOUS at 10:40

## 2021-10-16 RX ADMIN — Medication 12.5 MG: at 19:49

## 2021-10-16 RX ADMIN — ENOXAPARIN SODIUM 40 MG: 100 INJECTION SUBCUTANEOUS at 03:08

## 2021-10-16 RX ADMIN — LAMOTRIGINE 150 MG: 100 TABLET ORAL at 19:48

## 2021-10-16 RX ADMIN — INSULIN GLARGINE 35 UNITS: 100 INJECTION, SOLUTION SUBCUTANEOUS at 09:40

## 2021-10-16 RX ADMIN — ALBUTEROL SULFATE 2 PUFF: 90 AEROSOL, METERED RESPIRATORY (INHALATION) at 18:21

## 2021-10-16 RX ADMIN — LAMOTRIGINE 150 MG: 100 TABLET ORAL at 09:37

## 2021-10-16 ASSESSMENT — ACTIVITIES OF DAILY LIVING (ADL)
ADLS_ACUITY_SCORE: 10
ADLS_ACUITY_SCORE: 14
ADLS_ACUITY_SCORE: 12
ADLS_ACUITY_SCORE: 14
ADLS_ACUITY_SCORE: 10
ADLS_ACUITY_SCORE: 14

## 2021-10-16 ASSESSMENT — MIFFLIN-ST. JEOR
SCORE: 1529.12
SCORE: 1518.69

## 2021-10-16 NOTE — PROGRESS NOTES
[facility] TRANSPORT NOTE  Data:   Reason for Transport:  Transfer to ICU    Remington Gutierrez was transported to ICU via wheel chair at 1100.  Patient was accompanied by Registered Nurse and Nursing Assistant. Equipment used for transport: Oxygen  Regular Facemask. Family was aware of reason for transport: yes.  Pt's SO Nathalia called and given update.    Action:  Report: given to Vilma.    Response:  Patient's condition when transferred off unit was stable.    Marilou Kirby RN

## 2021-10-16 NOTE — PLAN OF CARE
SBA, using urinal at bedside. Patient declined to get into bed, sleeps in recliner at home. Chair alarm on. Pt forgetful and anxious.      On HFNC 65 LPM 70%FiO2, saturations 93-98%. Desaturation to 87-89 while standing. Shortness of breath at rest and with activity. Bradycardic, upper 30s-50s. MD aware. Patient pulled off high flow X2 overnight, anxious.     Update given to family Nathalia klein.

## 2021-10-16 NOTE — PROGRESS NOTES
Patient in semi prone position ,tried to place on stomach, and patient turned on his side. Oxygen saturation at 98% on hiflow. Patient reports no S O A with activity at this time.Will continue to watch for increased oxygen needs.

## 2021-10-16 NOTE — PROGRESS NOTES
Care Management Follow Up    Length of Stay (days): 8    Expected Discharge Date: 10/20/2021     Concerns to be Addressed: discharge planning       Patient plan of care discussed at interdisciplinary rounds: Yes    Anticipated Discharge Disposition: Home with SO & increased services via [facility] (Phone: [phone] Fax: [phone]) vs TBD     Anticipated Discharge Services: None  Anticipated Discharge DME: Oxygen    Patient/family educated on Medicare website which has current facility and service quality ratings: yes  Education Provided on the Discharge Plan:  Not today  Patient/Family in Agreement with the Plan: yes    Referrals Placed by CM/SW: Homecare  Private pay costs discussed: Not today    Additional Information:  Per IDT rounds today, MD team states that pt is not medically stable for discharge today & is transferring to ICU for higher level of cares.       Previous to hospitalization, RNCM conducted initial assessment & determined that pt  lives in a house in [place], on his son, Tab's property (next door to son's home).  At baseline, pt is independent with ADLs, does not use assistive devices for ambulation and continues to drive.  Patient does NOT wear home oxygen at baseline.  Tab states at times patient has shortness of breath, related to his COPD, CHF, and is limited with his exercise tolerance.  Tab also states he can have short term memory loss at times.      Pt is current with [facility] (Phone: [phone] Fax: [phone]), RN for medication management and vital check weekly.      Patient's SO, Nathalia lives locally, visits often and is very involved in his care.  She accompanies patient to all MD appointments (all in the [facility]).  Nathalia was going to contact the [facility] to request an increase in Home Care services for pt's discharge to home.       Preliminarily discussed discharge planning and options, home w/ resumed HC vs TCU.  Pt not stable for therapy evaluations at this  time.      CM team to remain involved to assist/facilitate discharge discharge planning when appropriate.         EAMON Mccarthy

## 2021-10-16 NOTE — PROGRESS NOTES
Patient transferred to ICU at approximately 1100 due to increased oxygen needs and for bipap. Patient has been on the Hi-flow oxygen since arrived and has not yet been placed on bipap.Hi-flow at 60L-80% FI02. Patient alert, very [place] ,oriented. Able to stand and transfer to commode at bedside.

## 2021-10-16 NOTE — PROGRESS NOTES
[facility]ist Progress Note           Assessment & Plan        Remington Gutierrez is a 75 year old male admitted on 10/8/2021. [facility] with history of COPD, systolic heart failure, S/P AICD, type 2 diabetes, and anxiety/depression. He presented with increasing shortness of breath for 4 days, especially with activity and lying down, nonproductive cough, and was found to be COVID positive.     # Acute Hypoxic Respiratory Failure secondary to COVID-19 infection  # Viral Pneumonia secondary to COVID-19 infection     Symptom Onset 10/4/2021   Date of 1st Positive Test 10/8/2021   Vaccination Status Partially Vaccinated - 1st dose < 1 week prior to onset of symptoms             Initial chest x-ray demonstrated bilateral interstitial and groundglass opacities consistent with pneumonia.  He initially required oxygen at 5 L/min per nasal cannula, though he came more hypoxemic 10/9/2021, requiring the use of HFNC oxygen.  He remains on HFNC oxygen.  Flow has been increased to a peak of 60 L/min 10/14, down to 55 with FiO2 70% 10/15.   breathing slightly improved 10/15, exertional dyspnea remains fairly severe.  Cough is now not bothersome, and is nonproductive.  He has no respiratory chest pain.  He remains afebrile.      oxygen needs slightly increased 10/16 - Chest x-ray showed COVID with possible area of lung injury right base, however CT chest just looked like COVID without any focal changes in that area.   Trying to diurese starting 10/16 as below.  Procalcitonin pending.     - Oxygen: 10/14/21 -- Continue HFNC 60 L/min with FiO2 0.70, and titrate to keep SpO2 between 90-96%.  Although these current oxygen needs would typically prompt ICU transfer, the patient has been clinically stable.  10/15/2021 -- 55 at 70%, 10/16/2021 -- up to 60L/min 70-80%, transferred to ICU.  - Labs: .0 --> 100.0 --> 50.5 --> 34.3 --> 16.2 --> 4.4,  --> 714 --> 637 --> 660, fibrinogen 633 --> 677 --> 668  --> 650 --> 556, d-Dimer 1.43 --> 1.17 --> 0.67 --> 0.64 --> 0.61 --> 0.85.  Trend toward improvement continues, and some values are approaching normal.  We have discontinued daily monitoring.    - Imaging: No additional imaging necessary.  - Breathing treatments:  Continue albuterol HFA and home tiotropium inhalers as below; avoid nebulizers in favor of MDIs.  - IV fluids: None currently indicated.  - Antibiotics: Not indicated.  - COVID-Focused Medications:   Dexamethasone 6 mg  X 10 days started on 10/8/2021. remdesivir was eventually started 10/10/2021 once renal function improved.  Completed 5 day course.  Tocilizumab 600 mg IV given 10/10/2021 after discussion with Dr. Tapia, infectious disease.    - DVT Prophylaxis:   On admission started heparin 5000 units every 8 hours due to TANIA, which was then changed to a low intensity heparin infusion when hypoxemia worsened.  Kidney injury subsequently improved, and the patient was clinically stable in terms of O2 needs, so heparin was discontinued and enoxaparin 0.5 mg/kg subcu every 12 hours started and continues.  Consider anticoag on discharge for 30 days & until return to normal mobility    repeating chest x-ray and ABG AM 10/16     Acute Kidney Injury  Chronic kidney disease stage 2  Baseline GFR appears to be 60-65.  Admission GFR 29, likely representing acute kidney injury due to volume depletion from acute illness.  He received a 500 mL IV fluid bolus in ED, and preadmission lisinopril, furosemide, and Mobic were held.  Furosemide was resumed 10/12/2021. There was a gradual improvement in GFR: 29 --> 33 --> 38 --> 49 --> 60 -->64 as of 10/15/2021.    -Lisinopril held, reassess 10/16   -Preadmission furosemide 20 mg p.o. twice daily was resumed 10/12/2021 due to underlying systolic congestive heart failure.  However, creatinine worsened with this so was again held 10/14.  Resumed lasix but at 20 IV twice daily 10/16.  -Continue to hold Mobic until necessary  for symptom control.     Acute encephalopathy due to COVID-19 and corticosteroids  Acute toxic encephalopathy due to zolpidem 10/12 - 10/13/2021  Possible mild cognitive impairment/dementia  Noted to have some cognitive impairment on chart review, but not quantified, and no specific diagnosis has been made.  He was more acutely confused in the ER, pulling out IV's x 2 and removing his oxygen.  However, at time of admission H+P he was improved and appeared at cognitive baseline.  There had been no additional confusion or agitation until the early morning hours of 10/13/2021, at which time the patient was noted to be confused, agitated, and taking out his HFNC cannula.  He had gotten zolpidem 6.25 mg p.o. at bedtime, not a new medication for him, though I thought maybe the combination of zolpidem and dexamethasone was enough to cause the morning episode.  I stopped zolpidem 10/13/2021, and that night gave him ramelteon 8 mg p.o. at at bedtime x1.  He says that he slept poorly.  However, even though he did not get zolpidem last night, he is still more confused this morning than previous mornings, easily redirectable verbally, and only mildly agitated (shifting the bed clothes frequently, changing the oxygen tubing from side to side, etc.).  As result, I do not think we can blame zolpidem for his mild encephalopathy.  It is more likely COVID-19 and corticosteroid effect.  -Zolpidem was discontinued.  -Ramelteon 8 mg p.o. was ineffective last night; patient does not wish to continue trying it.  -tried lorazepam 1 mg p.o. at at bedtime 10/14 and patient did much better with this.   Continued for now.    -encephalopathy appears resolved 10/15. Follow.       Chronic Systolic Congestive Heart Failure   Dilated Cardiomyopathy  Noted to have history of systolic CHF and dilated cardiomyopathy. [facility]. Non-ishemic cardiomyopathy. ECHO 4/2021 - EF 25-30%. Severe LV dilation. Global hypokinesis. RVSF mildly reduced.  Cardiac MRI 2/2021- c/w Non-ishemic cardiomyopathy. Ascending aorta 4.1 cm. Admitted with acute CHF [facility] 9/7-12/2021. Dry weight 202# per [facility] discharge summary 9/2021     On admission BNP elevated 3,907 (similar to 7/2021 ED visit for CHF/COPD). CXR on admission consistent with COVID pneumonia but not CHF.  Echocardiogram performed 10/11/2021 demonstrated a moderately dilated left ventricle, with severely reduced left ventricular systolic function, ejection fraction 30 to 35%.  Global hypokinesis was seen.  There was also grade 2 or moderate diastolic dysfunction noted.  On exam 10/14, there is no suggestion of pulmonary edema, and he has no peripheral edema.  As described above, there has been a slight decline in GFR from 10/13=10/14.  once again held preadmission furosemide 20 mg p.o. twice daily as a result.     -Lisinopril remains on hold. reassess lasix and lisinopril 10/17.      - suspect at least some degree of pulmonary edema contributing here - resumed lasix at 20 mg IV twice daily.    -Follow daily weights and I/Os.        Essential hypertension   Managed prior to admission with metoprolol XL 25 mg daily and furosemide 20 mg p.o. twice daily.  Metoprolol had to be held 10/12/2021 due to bradycardia while also on remdesivir.  Furosemide was resumed 10/12/2021, held 10/14/2021 due to a mild decline in renal function.    Blood pressure stable so far.      Bradycardia  The patient had heart rates intermittently as low as 38/min.  I am a little surprised to hear that he can achieve heart rates this low as he has a pacer/AICD device in place, which was functioning on admission EKG.  Nonetheless, it does not appear that he has had significant associated symptoms.  Bradycardia is probably a result of combined remdesivir and metoprolol therapy.  Because I would like for him to complete 5 days of remdesivir, I have put a temporary hold on metoprolol.  -Hold metoprolol XL 25 mg daily while on remdesivir - completed  10/14. Heart rate still in 40's 10/15.  resumed metoprolol 10/16 but as 12.5 mg twice daily in place of 25 mg XL daily, with holding parameters.        COPD    Noted on chart review. PFTs unavailable. Not on home oxygen.  exam suggests mild to moderate expiratory phase prolongation, without wheeze.  -Continued preadmission albuterol scheduled 4 times daily and every 4 hours as needed.  -Continue preadmission tiotropium.  - on dexamethasone as above.         Elevated troponin - suspect strain (Type 2 MI) due to COVID infection  Initial troponin 0.107. ECG showed paced rhythm, limiting interpretation. Asymptomatic; patient reported no chest pain prior to admission or since admission.  Suspect this elevation represents strain related to COVID pneumonia and hypoxemic failure.  Next troponins were normal at 0.023 --> 0.019.  -No additional troponin monitoring planned.     Diabetes Mellitus, Type 2  Managed prior to admission with empagliflozin 12.5 mg daily, glipizide 5 mg daily, and metformin 500 mg twice daily.  These outpatient medications have been held since admission. Hemoglobin A1c 7.2 on 10/5/2021.  The patient has been hyperglycemic since dexamethasone was added.  He is currently receiving glargine 35 units subcu every morning, prandial NovoLog 6 units subcu 3 times daily AC, and a NovoLog medium insulin resistance sliding scale.  Glucose control has been generally good, 126 - 182 over the past 5 checks.  -Continue glargine 35 units subcu every morning.  -Continue prandial 6 units subcu 3 times daily AC.  -Continue NovoLog medium resistance sliding scale.  -We will hold empagliflozin, glipizide, and Metformin for now.  - reasonable control on this so far.       BENNY  Mild, but severe supine by sleep study 2008. Reports using CPAP at home   - Using oxygen here for now.    - Avoid supine position while sleeping off CPAP     Anxiety and Depression  Insomnia  Previously diagnosed, and managed prior to admission with  lamotrigine 150 mg twice daily and paroxetine 40 mg p.o. nightly, both continued here.  Mood and affect have been stable this hospital stay.    -Continue preadmission lamotrigine and paroxetine.  -Out of concern that zolpidem is causing morning agitated delirium, I stopped zolpidem on 10/13/2021, and gave him ramelteon 8 mg p.o. at at bedtime that night.  He slept poorly on ramelteon, and does not wish to continue using it.  did markedly better on lorazepam 1 mg p.o. nightly, with encephalopathy resolved as above.       Sinus node dysfunction   S/P AICD 5/4/2021  Paced rhythm noted on admission ECG.  I cannot find details in the patient's past medical records as to the properties of his implantable device.  However, it does appear to be a pacemaker based upon the appearance of his admission EKG.  As result, I am a bit surprised to see that he had heart rates as low as 38/min overnight, discussed under bradycardia above.     Pulmonary nodule  9 mm nodule seen on CT chest - recommend follow-up CT in 3 months.      Rosacea  Continue home doxycycline daily.       DVT Prophylaxis: Enoxaparin 0.5 mg subcu every 12 hours (HFNC).  Rose Catheter: Not present  Central Lines: None  Code Status: Full Code            Diet  Orders Placed This Encounter      Combination Diet Consistent Carb 75 Grams CHO per Meal Diet; Easy to Chew (level 7); Thin Liquids (level 0)          Disposition  Transferred to ICU, anticipate at least 5-7 more days inpatient.              Interval History:   Patient needed oxygen increased to 65L/min overnight at 70%.  Down during the day to 60L/min.  Patient symptomatically unchanged.  No fever or chills. No increased dyspnea.  No pain.              Review of Systems:    ROS: 10 point ROS neg other than the symptoms noted above in the HPI.           Medications:   Current active medications and PTA medications reviewed, see medication list for details.            Physical Exam:   Vitals were  reviewed  Patient Vitals for the past 24 hrs:   BP Temp Temp src Pulse Resp SpO2 Weight   10/16/21 1500 -- 98  F (36.7  C) Oral -- -- 95 % --   10/16/21 1116 (!) 105/99 -- -- 80 21 91 % --   10/16/21 1114 (!) 105/99 -- -- 76 24 91 % --   10/16/21 1112 (!) 87/82 -- -- 86 17 91 % --   10/16/21 1108 (!) 73/55 -- -- 76 20 (!) 85 % --   10/16/21 1100 97/66 97  F (36.1  C) Oral 80 -- 96 % 82.6 kg (182 lb 1.6 oz)   10/16/21 0750 125/65 97.4  F (36.3  C) Oral (!) 42 20 90 % 81.6 kg (179 lb 12.8 oz)   10/16/21 0300 119/68 97.2  F (36.2  C) Axillary (!) 45 20 92 % --   10/16/21 0000 104/70 97.8  F (36.6  C) Oral 72 22 94 % --   10/15/21 1916 117/65 98  F (36.7  C) Oral 83 20 97 % --       Temperatures:  Current - Temp: 98  F (36.7  C); Max - Temp  Av.6  F (36.4  C)  Min: 97  F (36.1  C)  Max: 98  F (36.7  C)  Respiration range: Resp  Av.5  Min: 17  Max: 24  Pulse range: Pulse  Av.1  Min: 42  Max: 86  Blood pressure range: Systolic (24hrs), Av , Min:73 , Max:125   ; Diastolic (24hrs), Av, Min:55, Max:99    Pulse oximetry range: SpO2  Av.2 %  Min: 85 %  Max: 97 %  I/O last 3 completed shifts:  In: 480 [P.O.:480]  Out: 1300 [Urine:1300]    Intake/Output Summary (Last 24 hours) at 10/16/2021 1802  Last data filed at 10/16/2021 1415  Gross per 24 hour   Intake 240 ml   Output 1100 ml   Net -860 ml     EXAM:  General: awake and alert, NAD, oriented x 3  Head: normocephalic  Neck: unremarkable, no lymphadenopathy   HEENT: oropharynx pink and moist    Heart: Regular rate and rhythm, no murmurs, rubs, or gallops  Lungs: faint crackles at bases, mildly coarse breath sounds throughout.  No notable wheezing.    Abdomen: soft, non-tender, no masses or organomegaly  Extremities: no edema in lower extremities   Skin unremarkable.               Data:     Results for orders placed or performed during the hospital encounter of 10/08/21 (from the past 24 hour(s))   Glucose by meter   Result Value Ref Range     GLUCOSE BY METER POCT 237 (H) 70 - 99 mg/dL   Glucose by meter   Result Value Ref Range    GLUCOSE BY METER POCT 140 (H) 70 - 99 mg/dL   CRP inflammation   Result Value Ref Range    CRP Inflammation 3.0 0.0 - 8.0 mg/L   D dimer quantitative   Result Value Ref Range    D-Dimer Quantitative 0.74 (H) 0.00 - 0.50 ug/mL FEU    Narrative    This D-dimer assay is intended for use in conjunction with a clinical pretest probability assessment model to exclude pulmonary embolism (PE) and deep venous thrombosis (DVT) in outpatients suspected of PE or DVT. The cut-off value is 0.50 ug/mL FEU.   CBC with platelets   Result Value Ref Range    WBC Count 9.9 4.0 - 11.0 10e3/uL    RBC Count 5.04 4.40 - 5.90 10e6/uL    Hemoglobin 16.3 13.3 - 17.7 g/dL    Hematocrit 46.8 40.0 - 53.0 %    MCV 93 78 - 100 fL    MCH 32.3 26.5 - 33.0 pg    MCHC 34.8 31.5 - 36.5 g/dL    RDW 12.5 10.0 - 15.0 %    Platelet Count 270 150 - 450 10e3/uL   Basic metabolic panel   Result Value Ref Range    Sodium 137 133 - 144 mmol/L    Potassium 4.4 3.4 - 5.3 mmol/L    Chloride 110 (H) 94 - 109 mmol/L    Carbon Dioxide (CO2) 20 20 - 32 mmol/L    Anion Gap 7 3 - 14 mmol/L    Urea Nitrogen 50 (H) 7 - 30 mg/dL    Creatinine 1.09 0.66 - 1.25 mg/dL    Calcium 9.4 8.5 - 10.1 mg/dL    Glucose 124 (H) 70 - 99 mg/dL    GFR Estimate 65 >60 mL/min/1.73m2   XR Chest Port 1 View    Narrative    EXAM: XR CHEST PORT 1 VIEW  LOCATION: [facility]  DATE/TIME: 10/16/2021 6:40 AM    INDICATION: COVID, persistent hypoxia - please compare to previous  COMPARISON: 10/08/2021, 7/23/2021 and 10/19/2020      Impression    IMPRESSION: Multi lead left subclavian venous pacer. Slightly better inspiration.    Fine, relatively symmetrical interstitial and groundglass opacities are again noted. Small, new focal areas of coalescing opacities noted in the right lower lobe laterally. Findings suggest acute lung injury and radiographic appearance is not typical of    Covid pneumonia. No hydropneumothorax. Heart is of normal size. Lower anterior cervical spinal fusion apparatus again noted.    Imaging features are atypical or uncommonly reported for (COVID-19) pneumonia. Alternative diagnoses should be considered.   Blood gas arterial   Result Value Ref Range    pH Arterial 7.47 (H) 7.35 - 7.45    pCO2 Arterial 25 (L) 35 - 45 mm Hg    pO2 Arterial 60 (L) 80 - 105 mm Hg    FIO2 70     Bicarbonate Arterial 18 (L) 21 - 28 mmol/L    Base Excess/Deficit (+/-) -3.6 -9.0 - 1.8 mmol/L   Glucose by meter   Result Value Ref Range    GLUCOSE BY METER POCT 88 70 - 99 mg/dL   CT Chest Pulmonary Embolism w Contrast   Result Value Ref Range    Radiologist flags Lung nodule     Narrative    EXAM: CT CHEST PULMONARY EMBOLISM W CONTRAST  LOCATION: [facility]  DATE/TIME: 10/16/2021 10:38 AM    INDICATION: PE suspected, low/intermediate prob, positive D-dimer. Shortness of breath. COVID-19.  COMPARISON: Chest x-rays, most recently performed earlier today.  TECHNIQUE: CT chest pulmonary angiogram during arterial phase injection of IV contrast. Multiplanar reformats and MIP reconstructions were performed. Dose reduction techniques were used.   CONTRAST: 79mL [id number]    FINDINGS:  ANGIOGRAM CHEST: Pulmonary arteries are normal caliber and negative for pulmonary emboli. Thoracic aorta is not well opacified and is  indeterminate for dissection. No CT evidence of right heart strain.    LUNGS AND PLEURA: Moderate diffuse basilar and peripheral predominant groundglass opacities. Few mild patchy bibasilar consolidative opacities. No pleural effusion or pneumothorax.    9 mm average diameter right lower lobe pulmonary nodule (series 6, image 180).    MEDIASTINUM/AXILLAE: Left-sided dual-lead pacemaker. Cardiomegaly. No pericardial effusion. No lymphadenopathy.    CORONARY ARTERY CALCIFICATION: Mild.    UPPER ABDOMEN: No significant incidental findings in the visualized upper  abdomen.    MUSCULOSKELETAL: Few old healed rib fracture deformities. Mild degenerative changes of the thoracic spine. Partially visualized changes of anterior cervical spinal fusion.      Impression    IMPRESSION:  1.  No pulmonary embolism.    2.  Moderate diffuse opacities, consistent with reported history of COVID-19 pneumonia.    3.  9 mm right lower lobe pulmonary nodule. Recommend 3 month follow-up chest CT, as detailed below.    4.  Cardiomegaly.    REFERENCE:  Guidelines for Management of Incidental Pulmonary Nodules Detected on CT Images: From the Fleischner Society 2017.   Guidelines apply to incidental nodules in patients who are 35 years or older.  Guidelines do not apply to lung cancer screening, patients with immunosuppression, or patients with known primary cancer.    SINGLE NODULE    Nodule size >8 mm  Either low or high-risk patients:  Consider CT, PET/CT, or tissue sampling at 3 months.    Consider referral to lung nodule clinic.      [Recommend Follow Up: Lung nodule]    This report will be copied to the [facility] to ensure a provider acknowledges the finding.        Glucose by meter   Result Value Ref Range    GLUCOSE BY METER POCT 116 (H) 70 - 99 mg/dL   Glucose by meter   Result Value Ref Range    GLUCOSE BY METER POCT 58 (L) 70 - 99 mg/dL       All imaging studies reviewed by me.    Attestation:  I have reviewed today's vital signs, notes, medications, labs and imaging.  Amount of time spent in direct patient care providing critical care: 60 minutes.     Morgan Rollins MD, MD

## 2021-10-17 LAB
ALBUMIN SERPL-MCNC: 3 G/DL (ref 3.4–5)
ALP SERPL-CCNC: 112 U/L (ref 40–150)
ALT SERPL W P-5'-P-CCNC: 40 U/L (ref 0–70)
ANION GAP SERPL CALCULATED.3IONS-SCNC: 8 MMOL/L (ref 3–14)
AST SERPL W P-5'-P-CCNC: 42 U/L (ref 0–45)
BASE EXCESS BLDV CALC-SCNC: 0.1 MMOL/L (ref -7.7–1.9)
BILIRUB SERPL-MCNC: 0.8 MG/DL (ref 0.2–1.3)
BUN SERPL-MCNC: 50 MG/DL (ref 7–30)
CALCIUM SERPL-MCNC: 9.6 MG/DL (ref 8.5–10.1)
CHLORIDE BLD-SCNC: 108 MMOL/L (ref 94–109)
CO2 SERPL-SCNC: 21 MMOL/L (ref 20–32)
CREAT SERPL-MCNC: 1.25 MG/DL (ref 0.66–1.25)
CRP SERPL-MCNC: 2.9 MG/L (ref 0–8)
ERYTHROCYTE [DISTWIDTH] IN BLOOD BY AUTOMATED COUNT: 12.5 % (ref 10–15)
GFR SERPL CREATININE-BSD FRML MDRD: 55 ML/MIN/1.73M2
GLUCOSE BLD-MCNC: 138 MG/DL (ref 70–99)
GLUCOSE BLDC GLUCOMTR-MCNC: 153 MG/DL (ref 70–99)
GLUCOSE BLDC GLUCOMTR-MCNC: 153 MG/DL (ref 70–99)
GLUCOSE BLDC GLUCOMTR-MCNC: 159 MG/DL (ref 70–99)
GLUCOSE BLDC GLUCOMTR-MCNC: 69 MG/DL (ref 70–99)
HCO3 BLDV-SCNC: 24 MMOL/L (ref 21–28)
HCT VFR BLD AUTO: 46.2 % (ref 40–53)
HGB BLD-MCNC: 15.9 G/DL (ref 13.3–17.7)
MAGNESIUM SERPL-MCNC: 2.4 MG/DL (ref 1.6–2.3)
MCH RBC QN AUTO: 32 PG (ref 26.5–33)
MCHC RBC AUTO-ENTMCNC: 34.4 G/DL (ref 31.5–36.5)
MCV RBC AUTO: 93 FL (ref 78–100)
O2/TOTAL GAS SETTING VFR VENT: 0 %
PCO2 BLDV: 35 MM HG (ref 40–50)
PH BLDV: 7.44 [PH] (ref 7.32–7.43)
PHOSPHATE SERPL-MCNC: 4.7 MG/DL (ref 2.5–4.5)
PLATELET # BLD AUTO: 259 10E3/UL (ref 150–450)
PO2 BLDV: 27 MM HG (ref 25–47)
POTASSIUM BLD-SCNC: 4.7 MMOL/L (ref 3.4–5.3)
PROT SERPL-MCNC: 6.6 G/DL (ref 6.8–8.8)
RBC # BLD AUTO: 4.97 10E6/UL (ref 4.4–5.9)
SODIUM SERPL-SCNC: 137 MMOL/L (ref 133–144)
WBC # BLD AUTO: 12.9 10E3/UL (ref 4–11)

## 2021-10-17 PROCEDURE — 36415 COLL VENOUS BLD VENIPUNCTURE: CPT | Performed by: FAMILY MEDICINE

## 2021-10-17 PROCEDURE — 250N000013 HC RX MED GY IP 250 OP 250 PS 637: Performed by: FAMILY MEDICINE

## 2021-10-17 PROCEDURE — 85027 COMPLETE CBC AUTOMATED: CPT | Performed by: FAMILY MEDICINE

## 2021-10-17 PROCEDURE — 80053 COMPREHEN METABOLIC PANEL: CPT | Performed by: FAMILY MEDICINE

## 2021-10-17 PROCEDURE — 250N000012 HC RX MED GY IP 250 OP 636 PS 637

## 2021-10-17 PROCEDURE — 200N000001 HC R&B ICU

## 2021-10-17 PROCEDURE — 250N000011 HC RX IP 250 OP 636

## 2021-10-17 PROCEDURE — 83735 ASSAY OF MAGNESIUM: CPT | Performed by: FAMILY MEDICINE

## 2021-10-17 PROCEDURE — 250N000011 HC RX IP 250 OP 636: Performed by: FAMILY MEDICINE

## 2021-10-17 PROCEDURE — 82803 BLOOD GASES ANY COMBINATION: CPT | Performed by: FAMILY MEDICINE

## 2021-10-17 PROCEDURE — 86140 C-REACTIVE PROTEIN: CPT | Performed by: FAMILY MEDICINE

## 2021-10-17 PROCEDURE — 84100 ASSAY OF PHOSPHORUS: CPT | Performed by: FAMILY MEDICINE

## 2021-10-17 PROCEDURE — 999N000215 HC STATISTIC HFNC ADULT NON-CPAP

## 2021-10-17 PROCEDURE — 250N000013 HC RX MED GY IP 250 OP 250 PS 637

## 2021-10-17 PROCEDURE — 99291 CRITICAL CARE FIRST HOUR: CPT | Performed by: FAMILY MEDICINE

## 2021-10-17 RX ORDER — FUROSEMIDE 10 MG/ML
20 INJECTION INTRAMUSCULAR; INTRAVENOUS DAILY
Status: DISCONTINUED | OUTPATIENT
Start: 2021-10-18 | End: 2021-10-18

## 2021-10-17 RX ADMIN — Medication 12.5 MG: at 07:59

## 2021-10-17 RX ADMIN — Medication 12.5 MG: at 20:18

## 2021-10-17 RX ADMIN — DEXAMETHASONE 6 MG: 2 TABLET ORAL at 15:12

## 2021-10-17 RX ADMIN — ACETAMINOPHEN 650 MG: 325 TABLET, FILM COATED ORAL at 16:47

## 2021-10-17 RX ADMIN — INSULIN GLARGINE 35 UNITS: 100 INJECTION, SOLUTION SUBCUTANEOUS at 07:52

## 2021-10-17 RX ADMIN — LAMOTRIGINE 150 MG: 100 TABLET ORAL at 20:18

## 2021-10-17 RX ADMIN — ENOXAPARIN SODIUM 40 MG: 100 INJECTION SUBCUTANEOUS at 15:12

## 2021-10-17 RX ADMIN — ACETAMINOPHEN 650 MG: 325 TABLET, FILM COATED ORAL at 07:57

## 2021-10-17 RX ADMIN — PAROXETINE HYDROCHLORIDE 40 MG: 20 TABLET, FILM COATED ORAL at 20:19

## 2021-10-17 RX ADMIN — BRIMONIDINE TARTRATE 1 DROP: 2 SOLUTION OPHTHALMIC at 08:01

## 2021-10-17 RX ADMIN — ALBUTEROL SULFATE 2 PUFF: 90 AEROSOL, METERED RESPIRATORY (INHALATION) at 20:22

## 2021-10-17 RX ADMIN — DOXYCYCLINE HYCLATE 50 MG: 50 CAPSULE ORAL at 07:53

## 2021-10-17 RX ADMIN — FUROSEMIDE 20 MG: 10 INJECTION, SOLUTION INTRAMUSCULAR; INTRAVENOUS at 07:50

## 2021-10-17 RX ADMIN — BRIMONIDINE TARTRATE 1 DROP: 2 SOLUTION OPHTHALMIC at 20:23

## 2021-10-17 RX ADMIN — UMECLIDINIUM 1 PUFF: 62.5 AEROSOL, POWDER ORAL at 08:02

## 2021-10-17 RX ADMIN — LAMOTRIGINE 150 MG: 100 TABLET ORAL at 07:58

## 2021-10-17 RX ADMIN — INSULIN ASPART 1 UNITS: 100 INJECTION, SOLUTION INTRAVENOUS; SUBCUTANEOUS at 11:53

## 2021-10-17 RX ADMIN — ALBUTEROL SULFATE 2 PUFF: 90 AEROSOL, METERED RESPIRATORY (INHALATION) at 08:00

## 2021-10-17 RX ADMIN — ENOXAPARIN SODIUM 40 MG: 100 INJECTION SUBCUTANEOUS at 02:18

## 2021-10-17 RX ADMIN — ACETAMINOPHEN 650 MG: 325 TABLET, FILM COATED ORAL at 21:33

## 2021-10-17 RX ADMIN — LORAZEPAM 1 MG: 1 TABLET ORAL at 20:18

## 2021-10-17 RX ADMIN — ALBUTEROL SULFATE 2 PUFF: 90 AEROSOL, METERED RESPIRATORY (INHALATION) at 12:14

## 2021-10-17 RX ADMIN — SIMVASTATIN 20 MG: 20 TABLET, FILM COATED ORAL at 20:19

## 2021-10-17 ASSESSMENT — MIFFLIN-ST. JEOR: SCORE: 1523.12

## 2021-10-17 ASSESSMENT — ACTIVITIES OF DAILY LIVING (ADL)
ADLS_ACUITY_SCORE: 14

## 2021-10-17 NOTE — PROGRESS NOTES
Oxygen saturation remains stable while patient at rest-97%.Turned FI02 down to 50% at 10:00am Will watch and titrate oxygen to maintain stable oxygen saturation. Oxygen saturation still drops with activity ,but recoups quickly.

## 2021-10-17 NOTE — PROGRESS NOTES
[facility]ist Progress Note           Assessment & Plan          Remington Gutierrez is a 75 year old male admitted on 10/8/2021. [facility] with history of COPD, systolic heart failure, S/P AICD, type 2 diabetes, and anxiety/depression. He presented with increasing shortness of breath for 4 days, especially with activity and lying down, nonproductive cough, and was found to be COVID positive.     # Acute Hypoxic Respiratory Failure secondary to COVID-19 infection  # Viral Pneumonia secondary to COVID-19 infection     Symptom Onset 10/4/2021   Date of 1st Positive Test 10/8/2021   Vaccination Status Partially Vaccinated - 1st dose < 1 week prior to onset of symptoms             Initial chest x-ray demonstrated bilateral interstitial and groundglass opacities consistent with pneumonia.  He initially required oxygen at 5 L/min per nasal cannula, though he came more hypoxemic 10/9/2021, requiring the use of HFNC oxygen.  He remains on HFNC oxygen.  Flow has been increased to a peak of 60 L/min 10/14, down to 55 with FiO2 70% 10/15.   breathing slightly improved 10/15, exertional dyspnea remains fairly severe.  Cough is now not bothersome, and is nonproductive.  He has no respiratory chest pain.  He remains afebrile.      oxygen needs slightly increased 10/16 - Chest x-ray showed COVID with possible area of lung injury right base, however CT chest just looked like COVID without any focal changes in that area.   Diuresing starting 10/16 as below.  Procalcitonin very low risk.  Continue diuresis 10/17, slowed to 20 IV daily.       - Oxygen: 10/14/21 -- Continue HFNC 60 L/min with FiO2 0.70, and titrate to keep SpO2 between 90-96%.  Although these current oxygen needs would typically prompt ICU transfer, the patient has been clinically stable.  10/15/2021 -- 55 at 70%, 10/16/2021 -- up to 60L/min 70-80%, transferred to ICU.  Unchanged 10/17  - Labs: .0 --> 100.0 --> 50.5 --> 34.3 --> 16.2 -->  4.4,  --> 714 --> 637 --> 660, fibrinogen 633 --> 677 --> 668 --> 650 --> 556, d-Dimer 1.43 --> 1.17 --> 0.67 --> 0.64 --> 0.61 --> 0.85.  Trend toward improvement continues, and some values are approaching normal.  We have discontinued daily monitoring.    - Imaging: No additional imaging necessary.  - Breathing treatments:  Continue albuterol HFA and home tiotropium inhalers as below; avoid nebulizers in favor of MDIs.  - IV fluids: None currently indicated.  - Antibiotics: Not indicated.  - COVID-Focused Medications:   Dexamethasone 6 mg  X 10 days started on 10/8/2021. remdesivir was eventually started 10/10/2021 once renal function improved.  Completed 5 day course.  Tocilizumab 600 mg IV given 10/10/2021 after discussion with Dr. Tapia, infectious disease.    - DVT Prophylaxis:   On admission started heparin 5000 units every 8 hours due to TANIA, which was then changed to a low intensity heparin infusion when hypoxemia worsened.  Kidney injury subsequently improved, and the patient was clinically stable in terms of O2 needs, so heparin was discontinued and enoxaparin 0.5 mg/kg subcu every 12 hours started and continues.  Consider anticoag on discharge for 30 days & until return to normal mobility      Acute Kidney Injury  Chronic kidney disease stage 2  Baseline GFR appears to be 60-65.  Admission GFR 29, likely representing acute kidney injury due to volume depletion from acute illness.  He received a 500 mL IV fluid bolus in ED, and preadmission lisinopril, furosemide, and Mobic were held.  Furosemide was resumed 10/12/2021. There was a gradual improvement in GFR: 29 --> 33 --> 38 --> 49 --> 60 -->64 as of 10/15/2021.    -Lisinopril held, reassess 10/16   -Preadmission furosemide 20 mg p.o. twice daily was resumed 10/12/2021 due to underlying systolic congestive heart failure.  However, creatinine worsened with this so was again held 10/14.  Resumed lasix but at 20 IV twice daily 10/16.  -Continue to  hold Mobic until necessary for symptom control.     Acute encephalopathy due to COVID-19 and corticosteroids  Acute toxic encephalopathy due to zolpidem 10/12 - 10/13/2021  Possible mild cognitive impairment/dementia  Noted to have some cognitive impairment on chart review, but not quantified, and no specific diagnosis has been made.  He was more acutely confused in the ER, pulling out IV's x 2 and removing his oxygen.  However, at time of admission H+P he was improved and appeared at cognitive baseline.  There had been no additional confusion or agitation until the early morning hours of 10/13/2021, at which time the patient was noted to be confused, agitated, and taking out his HFNC cannula.  He had gotten zolpidem 6.25 mg p.o. at bedtime, not a new medication for him, though I thought maybe the combination of zolpidem and dexamethasone was enough to cause the morning episode.  I stopped zolpidem 10/13/2021, and that night gave him ramelteon 8 mg p.o. at at bedtime x1.  He says that he slept poorly.  However, even though he did not get zolpidem last night, he is still more confused this morning than previous mornings, easily redirectable verbally, and only mildly agitated (shifting the bed clothes frequently, changing the oxygen tubing from side to side, etc.).  As result, I do not think we can blame zolpidem for his mild encephalopathy.  It is more likely COVID-19 and corticosteroid effect.  -Zolpidem was discontinued.  -Ramelteon 8 mg p.o. was ineffective last night; patient does not wish to continue trying it.  -tried lorazepam 1 mg p.o. at at bedtime 10/14 and patient did much better with this.   Continued for now.    -encephalopathy appears resolved 10/15. Follow.       Chronic Systolic Congestive Heart Failure   Dilated Cardiomyopathy  Noted to have history of systolic CHF and dilated cardiomyopathy. [facility]. Non-ishemic cardiomyopathy. ECHO 4/2021 - EF 25-30%. Severe LV dilation. Global  hypokinesis. RVSF mildly reduced. Cardiac MRI 2/2021- c/w Non-ishemic cardiomyopathy. Ascending aorta 4.1 cm. Admitted with acute CHF [facility] 9/7-12/2021. Dry weight 202# per [facility] discharge summary 9/2021     On admission BNP elevated 3,907 (similar to 7/2021 ED visit for CHF/COPD). CXR on admission consistent with COVID pneumonia but not CHF.  Echocardiogram performed 10/11/2021 demonstrated a moderately dilated left ventricle, with severely reduced left ventricular systolic function, ejection fraction 30 to 35%.  Global hypokinesis was seen.  There was also grade 2 or moderate diastolic dysfunction noted.  On exam 10/14, there is no suggestion of pulmonary edema, and he has no peripheral edema.  As described above, there has been a slight decline in GFR from 10/13=10/14.  once again held preadmission furosemide 20 mg p.o. twice daily as a result.     -Lisinopril remains on hold.     - suspect at least some degree of pulmonary edema contributing here - resumed lasix at 20 mg IV twice daily 10/16, slowed to once daily 10/17  -Follow daily weights and I/Os.        Essential hypertension   Managed prior to admission with metoprolol XL 25 mg daily and furosemide 20 mg p.o. twice daily.  Metoprolol had to be held 10/12/2021 due to bradycardia while also on remdesivir.  Furosemide as above.  Resumed metoprolol 10/16.    Blood pressure stable so far.      Bradycardia  The patient had heart rates intermittently as low as 38/min.  I am a little surprised to hear that he can achieve heart rates this low as he has a pacer/AICD device in place, which was functioning on admission EKG.  Nonetheless, it does not appear that he has had significant associated symptoms.  Bradycardia is probably a result of combined remdesivir and metoprolol therapy.  Because I would like for him to complete 5 days of remdesivir, I have put a temporary hold on metoprolol.  -Hold metoprolol XL 25 mg daily while on remdesivir - completed 10/14. Heart rate  still in 40's 10/15.  resumed metoprolol 10/16 but as 12.5 mg twice daily in place of 25 mg XL daily, with holding parameters.        COPD    Noted on chart review. PFTs unavailable. Not on home oxygen.  exam suggests mild to moderate expiratory phase prolongation, without wheeze.  -Continued preadmission albuterol scheduled 4 times daily and every 4 hours as needed.  -Continue preadmission tiotropium.  - on dexamethasone as above initially, then stopped.  If not improving, consider resuming steroids in some form.       Elevated troponin - suspect strain (Type 2 MI) due to COVID infection  Initial troponin 0.107. ECG showed paced rhythm, limiting interpretation. Asymptomatic; patient reported no chest pain prior to admission or since admission.  Suspect this elevation represents strain related to COVID pneumonia and hypoxemic failure.  Next troponins were normal at 0.023 --> 0.019.  -No additional troponin monitoring planned.     Diabetes Mellitus, Type 2  Managed prior to admission with empagliflozin 12.5 mg daily, glipizide 5 mg daily, and metformin 500 mg twice daily.  These outpatient medications have been held since admission. Hemoglobin A1c 7.2 on 10/5/2021.  The patient has been hyperglycemic since dexamethasone was added.  He is currently receiving glargine 35 units subcu every morning, prandial NovoLog 6 units subcu 3 times daily AC, and a NovoLog medium insulin resistance sliding scale.  Glucose control has been generally good, 126 - 182 over the past 5 checks.  -Continue glargine 35 units subcu every morning.  -Continue prandial 6 units subcu 3 times daily AC.  -Continue NovoLog medium resistance sliding scale.  -We will hold empagliflozin, glipizide, and Metformin for now.  - reasonable control on this so far.       BENNY  Mild, but severe supine by sleep study 2008. Reports using CPAP at home   - Using oxygen here for now.    - Avoid supine position while sleeping off CPAP     Anxiety and  Depression  Insomnia  Previously diagnosed, and managed prior to admission with lamotrigine 150 mg twice daily and paroxetine 40 mg p.o. nightly, both continued here.  Mood and affect have been stable this hospital stay.    -Continue preadmission lamotrigine and paroxetine.  -Out of concern that zolpidem is causing morning agitated delirium, I stopped zolpidem on 10/13/2021, and gave him ramelteon 8 mg p.o. at at bedtime that night.  He slept poorly on ramelteon, and does not wish to continue using it.  did markedly better on lorazepam 1 mg p.o. nightly, with encephalopathy resolved as above.       Sinus node dysfunction   S/P AICD 5/4/2021  Paced rhythm noted on admission ECG.  I cannot find details in the patient's past medical records as to the properties of his implantable device.  However, it does appear to be a pacemaker based upon the appearance of his admission EKG.  As result, I am a bit surprised to see that he had heart rates as low as 38/min overnight, discussed under bradycardia above.     Pulmonary nodule  9 mm nodule seen on CT chest - recommend follow-up CT in 3 months.       Rosacea  Continue home doxycycline daily.       DVT Prophylaxis: Enoxaparin 0.5 mg subcu every 12 hours (HFNC).  Rose Catheter: Not present  Central Lines: None  Code Status: Full Code              Diet  Orders Placed This Encounter      Combination Diet Consistent Carb 75 Grams CHO per Meal Diet; Easy to Chew (level 7); Thin Liquids (level 0)                Disposition  Needing continued ICU cares, anticipate at least 2-3 more days ICU at least 7 more days inpatient              Interval History:   Patient says he feels pretty good.  No dyspnea, but still needing high flow oxygen.  No pain.  No other changes.    No other pain             Review of Systems:    ROS: 10 point ROS neg other than the symptoms noted above in the HPI.           Medications:   Current active medications and PTA medications reviewed, see medication  list for details.            Physical Exam:   Vitals were reviewed  Patient Vitals for the past 24 hrs:   BP Temp Temp src Pulse Resp SpO2 Weight   10/17/21 0800 -- -- Oral -- -- -- 82 kg (180 lb 12.4 oz)   10/17/21 0400 116/60 -- -- 73 14 95 % --   10/17/21 0317 -- 97.9  F (36.6  C) Oral -- -- -- --   10/17/21 0200 95/72 -- -- 73 14 96 % --   10/17/21 0113 -- -- -- -- -- 99 % --   10/17/21 0100 -- -- -- 69 13 99 % --   10/17/21 0015 -- -- -- -- -- 98 % --   10/17/21 0000 118/77 98  F (36.7  C) Oral 59 -- (!) 88 % --   10/16/21 2200 109/80 -- -- 64 19 96 % --   10/16/21 2000 114/73 -- -- 69 14 99 % --   10/16/21 1500 -- 98  F (36.7  C) Oral -- -- 95 % --   10/16/21 1116 (!) 105/99 -- -- 80 21 91 % --   10/16/21 1114 (!) 105/99 -- -- 76 24 91 % --   10/16/21 1112 (!) 87/82 -- -- 86 17 91 % --   10/16/21 1108 (!) 73/55 -- -- 76 20 (!) 85 % --   10/16/21 1100 97/66 97  F (36.1  C) Oral 80 -- 96 % 82.6 kg (182 lb 1.6 oz)       Temperatures:  Current - Temp: 97.9  F (36.6  C); Max - Temp  Av.7  F (36.5  C)  Min: 97  F (36.1  C)  Max: 98  F (36.7  C)  Respiration range: Resp  Av.3  Min: 13  Max: 24  Pulse range: Pulse  Av.2  Min: 59  Max: 86  Blood pressure range: Systolic (24hrs), Av , Min:73 , Max:118   ; Diastolic (24hrs), Av, Min:55, Max:99    Pulse oximetry range: SpO2  Av.2 %  Min: 85 %  Max: 99 %  I/O last 3 completed shifts:  In: -   Out: 2700 [Urine:2700]    Intake/Output Summary (Last 24 hours) at 10/17/2021 0919  Last data filed at 10/17/2021 0800  Gross per 24 hour   Intake 50 ml   Output 2675 ml   Net -2625 ml     EXAM:  General: awake and alert, NAD, oriented x 3  Head: normocephalic  Neck: unremarkable, no lymphadenopathy   HEENT: oropharynx pink and moist    Heart: Regular rate and rhythm, no murmurs, rubs, or gallops  Lungs: clear to auscultation bilaterally with good air movement throughout  Abdomen: soft, non-tender, no masses or organomegaly  Extremities: no edema in lower  extremities   Skin unremarkable.               Data:     Results for orders placed or performed during the hospital encounter of 10/08/21 (from the past 24 hour(s))   CT Chest Pulmonary Embolism w Contrast   Result Value Ref Range    Radiologist flags Lung nodule     Narrative    EXAM: CT CHEST PULMONARY EMBOLISM W CONTRAST  LOCATION: [facility]  DATE/TIME: 10/16/2021 10:38 AM    INDICATION: PE suspected, low/intermediate prob, positive D-dimer. Shortness of breath. COVID-19.  COMPARISON: Chest x-rays, most recently performed earlier today.  TECHNIQUE: CT chest pulmonary angiogram during arterial phase injection of IV contrast. Multiplanar reformats and MIP reconstructions were performed. Dose reduction techniques were used.   CONTRAST: 79mL Isovue-[id number]    FINDINGS:  ANGIOGRAM CHEST: Pulmonary arteries are normal caliber and negative for pulmonary emboli. Thoracic aorta is not well opacified and is  indeterminate for dissection. No CT evidence of right heart strain.    LUNGS AND PLEURA: Moderate diffuse basilar and peripheral predominant groundglass opacities. Few mild patchy bibasilar consolidative opacities. No pleural effusion or pneumothorax.    9 mm average diameter right lower lobe pulmonary nodule (series 6, image 180).    MEDIASTINUM/AXILLAE: Left-sided dual-lead pacemaker. Cardiomegaly. No pericardial effusion. No lymphadenopathy.    CORONARY ARTERY CALCIFICATION: Mild.    UPPER ABDOMEN: No significant incidental findings in the visualized upper abdomen.    MUSCULOSKELETAL: Few old healed rib fracture deformities. Mild degenerative changes of the thoracic spine. Partially visualized changes of anterior cervical spinal fusion.      Impression    IMPRESSION:  1.  No pulmonary embolism.    2.  Moderate diffuse opacities, consistent with reported history of COVID-19 pneumonia.    3.  9 mm right lower lobe pulmonary nodule. Recommend 3 month follow-up chest CT, as detailed below.    4.   Cardiomegaly.    REFERENCE:  Guidelines for Management of Incidental Pulmonary Nodules Detected on CT Images: From the Fleischner Society 2017.   Guidelines apply to incidental nodules in patients who are 35 years or older.  Guidelines do not apply to lung cancer screening, patients with immunosuppression, or patients with known primary cancer.    SINGLE NODULE    Nodule size >8 mm  Either low or high-risk patients:  Consider CT, PET/CT, or tissue sampling at 3 months.    Consider referral to lung nodule clinic.      [Recommend Follow Up: Lung nodule]    This report will be copied to the [facility] to ensure a provider acknowledges the finding.        Glucose by meter   Result Value Ref Range    GLUCOSE BY METER POCT 116 (H) 70 - 99 mg/dL   Glucose by meter   Result Value Ref Range    GLUCOSE BY METER POCT 58 (L) 70 - 99 mg/dL   Glucose by meter   Result Value Ref Range    GLUCOSE BY METER POCT 153 (H) 70 - 99 mg/dL   Glucose by meter   Result Value Ref Range    GLUCOSE BY METER POCT 153 (H) 70 - 99 mg/dL   CRP inflammation   Result Value Ref Range    CRP Inflammation 2.9 0.0 - 8.0 mg/L   Comprehensive metabolic panel   Result Value Ref Range    Sodium 137 133 - 144 mmol/L    Potassium 4.7 3.4 - 5.3 mmol/L    Chloride 108 94 - 109 mmol/L    Carbon Dioxide (CO2) 21 20 - 32 mmol/L    Anion Gap 8 3 - 14 mmol/L    Urea Nitrogen 50 (H) 7 - 30 mg/dL    Creatinine 1.25 0.66 - 1.25 mg/dL    Calcium 9.6 8.5 - 10.1 mg/dL    Glucose 138 (H) 70 - 99 mg/dL    Alkaline Phosphatase 112 40 - 150 U/L    AST 42 0 - 45 U/L    ALT 40 0 - 70 U/L    Protein Total 6.6 (L) 6.8 - 8.8 g/dL    Albumin 3.0 (L) 3.4 - 5.0 g/dL    Bilirubin Total 0.8 0.2 - 1.3 mg/dL    GFR Estimate 55 (L) >60 mL/min/1.73m2   Magnesium   Result Value Ref Range    Magnesium 2.4 (H) 1.6 - 2.3 mg/dL   Phosphorus   Result Value Ref Range    Phosphorus 4.7 (H) 2.5 - 4.5 mg/dL   CBC with platelets   Result Value Ref Range    WBC Count 12.9 (H) 4.0 - 11.0  10e3/uL    RBC Count 4.97 4.40 - 5.90 10e6/uL    Hemoglobin 15.9 13.3 - 17.7 g/dL    Hematocrit 46.2 40.0 - 53.0 %    MCV 93 78 - 100 fL    MCH 32.0 26.5 - 33.0 pg    MCHC 34.4 31.5 - 36.5 g/dL    RDW 12.5 10.0 - 15.0 %    Platelet Count 259 150 - 450 10e3/uL   Blood gas venous   Result Value Ref Range    pH Venous 7.44 (H) 7.32 - 7.43    pCO2 Venous 35 (L) 40 - 50 mm Hg    pO2 Venous 27 25 - 47 mm Hg    Bicarbonate Venous 24 21 - 28 mmol/L    Base Excess/Deficit (+/-) 0.1 -7.7 - 1.9 mmol/L    FIO2 0            Attestation:  I have reviewed today's vital signs, notes, medications, labs and imaging.  Amount of time spent in direct patient care providing critical care: 40 minutes.     Morgan Rollins MD, MD

## 2021-10-17 NOTE — PROGRESS NOTES
Patient up every hour to void setting off bed alarm every time. Patient educated on call light each time.

## 2021-10-18 LAB
ALBUMIN SERPL-MCNC: 3.2 G/DL (ref 3.4–5)
ALP SERPL-CCNC: 132 U/L (ref 40–150)
ALT SERPL W P-5'-P-CCNC: 45 U/L (ref 0–70)
ANION GAP SERPL CALCULATED.3IONS-SCNC: 9 MMOL/L (ref 3–14)
AST SERPL W P-5'-P-CCNC: 48 U/L (ref 0–45)
BASE EXCESS BLDV CALC-SCNC: 0.4 MMOL/L (ref -7.7–1.9)
BILIRUB SERPL-MCNC: 0.9 MG/DL (ref 0.2–1.3)
BUN SERPL-MCNC: 50 MG/DL (ref 7–30)
CALCIUM SERPL-MCNC: 9.6 MG/DL (ref 8.5–10.1)
CHLORIDE BLD-SCNC: 105 MMOL/L (ref 94–109)
CO2 SERPL-SCNC: 22 MMOL/L (ref 20–32)
CREAT SERPL-MCNC: 1.38 MG/DL (ref 0.66–1.25)
CRP SERPL-MCNC: <2.9 MG/L (ref 0–8)
ERYTHROCYTE [DISTWIDTH] IN BLOOD BY AUTOMATED COUNT: 12.5 % (ref 10–15)
GFR SERPL CREATININE-BSD FRML MDRD: 49 ML/MIN/1.73M2
GLUCOSE BLD-MCNC: 154 MG/DL (ref 70–99)
GLUCOSE BLDC GLUCOMTR-MCNC: 120 MG/DL (ref 70–99)
GLUCOSE BLDC GLUCOMTR-MCNC: 141 MG/DL (ref 70–99)
GLUCOSE BLDC GLUCOMTR-MCNC: 148 MG/DL (ref 70–99)
GLUCOSE BLDC GLUCOMTR-MCNC: 150 MG/DL (ref 70–99)
GLUCOSE BLDC GLUCOMTR-MCNC: 184 MG/DL (ref 70–99)
HCO3 BLDV-SCNC: 26 MMOL/L (ref 21–28)
HCT VFR BLD AUTO: 51.1 % (ref 40–53)
HGB BLD-MCNC: 17.4 G/DL (ref 13.3–17.7)
HOLD SPECIMEN: NORMAL
MAGNESIUM SERPL-MCNC: 2.2 MG/DL (ref 1.6–2.3)
MCH RBC QN AUTO: 32.2 PG (ref 26.5–33)
MCHC RBC AUTO-ENTMCNC: 34.1 G/DL (ref 31.5–36.5)
MCV RBC AUTO: 95 FL (ref 78–100)
O2/TOTAL GAS SETTING VFR VENT: 55 %
PCO2 BLDV: 45 MM HG (ref 40–50)
PH BLDV: 7.38 [PH] (ref 7.32–7.43)
PHOSPHATE SERPL-MCNC: 4.3 MG/DL (ref 2.5–4.5)
PLATELET # BLD AUTO: 309 10E3/UL (ref 150–450)
PO2 BLDV: 13 MM HG (ref 25–47)
POTASSIUM BLD-SCNC: 4.3 MMOL/L (ref 3.4–5.3)
PROT SERPL-MCNC: 7.6 G/DL (ref 6.8–8.8)
RBC # BLD AUTO: 5.4 10E6/UL (ref 4.4–5.9)
SODIUM SERPL-SCNC: 136 MMOL/L (ref 133–144)
WBC # BLD AUTO: 13 10E3/UL (ref 4–11)

## 2021-10-18 PROCEDURE — 83735 ASSAY OF MAGNESIUM: CPT | Performed by: FAMILY MEDICINE

## 2021-10-18 PROCEDURE — 82040 ASSAY OF SERUM ALBUMIN: CPT | Performed by: FAMILY MEDICINE

## 2021-10-18 PROCEDURE — 85014 HEMATOCRIT: CPT | Performed by: FAMILY MEDICINE

## 2021-10-18 PROCEDURE — 250N000012 HC RX MED GY IP 250 OP 636 PS 637

## 2021-10-18 PROCEDURE — 82803 BLOOD GASES ANY COMBINATION: CPT | Performed by: FAMILY MEDICINE

## 2021-10-18 PROCEDURE — 200N000001 HC R&B ICU

## 2021-10-18 PROCEDURE — 36415 COLL VENOUS BLD VENIPUNCTURE: CPT | Performed by: FAMILY MEDICINE

## 2021-10-18 PROCEDURE — 99291 CRITICAL CARE FIRST HOUR: CPT | Performed by: FAMILY MEDICINE

## 2021-10-18 PROCEDURE — 250N000011 HC RX IP 250 OP 636: Performed by: FAMILY MEDICINE

## 2021-10-18 PROCEDURE — 250N000013 HC RX MED GY IP 250 OP 250 PS 637: Performed by: FAMILY MEDICINE

## 2021-10-18 PROCEDURE — 84100 ASSAY OF PHOSPHORUS: CPT | Performed by: FAMILY MEDICINE

## 2021-10-18 PROCEDURE — 86140 C-REACTIVE PROTEIN: CPT | Performed by: FAMILY MEDICINE

## 2021-10-18 PROCEDURE — 250N000013 HC RX MED GY IP 250 OP 250 PS 637

## 2021-10-18 PROCEDURE — 250N000011 HC RX IP 250 OP 636

## 2021-10-18 RX ORDER — POLYETHYLENE GLYCOL 3350 17 G/17G
17 POWDER, FOR SOLUTION ORAL DAILY PRN
Status: DISCONTINUED | OUTPATIENT
Start: 2021-10-18 | End: 2021-10-25 | Stop reason: HOSPADM

## 2021-10-18 RX ADMIN — DOXYCYCLINE HYCLATE 50 MG: 50 CAPSULE ORAL at 08:20

## 2021-10-18 RX ADMIN — INSULIN ASPART 1 UNITS: 100 INJECTION, SOLUTION INTRAVENOUS; SUBCUTANEOUS at 12:20

## 2021-10-18 RX ADMIN — Medication 12.5 MG: at 08:20

## 2021-10-18 RX ADMIN — ALBUTEROL SULFATE 2 PUFF: 90 AEROSOL, METERED RESPIRATORY (INHALATION) at 11:06

## 2021-10-18 RX ADMIN — FUROSEMIDE 20 MG: 10 INJECTION, SOLUTION INTRAMUSCULAR; INTRAVENOUS at 08:20

## 2021-10-18 RX ADMIN — ALBUTEROL SULFATE 2 PUFF: 90 AEROSOL, METERED RESPIRATORY (INHALATION) at 08:15

## 2021-10-18 RX ADMIN — Medication 12.5 MG: at 20:24

## 2021-10-18 RX ADMIN — SIMVASTATIN 20 MG: 20 TABLET, FILM COATED ORAL at 21:57

## 2021-10-18 RX ADMIN — LAMOTRIGINE 150 MG: 100 TABLET ORAL at 08:20

## 2021-10-18 RX ADMIN — ALBUTEROL SULFATE 2 PUFF: 90 INHALANT RESPIRATORY (INHALATION) at 20:30

## 2021-10-18 RX ADMIN — SENNOSIDES AND DOCUSATE SODIUM 2 TABLET: 50; 8.6 TABLET ORAL at 11:06

## 2021-10-18 RX ADMIN — LAMOTRIGINE 150 MG: 100 TABLET ORAL at 20:23

## 2021-10-18 RX ADMIN — ENOXAPARIN SODIUM 40 MG: 100 INJECTION SUBCUTANEOUS at 02:45

## 2021-10-18 RX ADMIN — ENOXAPARIN SODIUM 40 MG: 100 INJECTION SUBCUTANEOUS at 15:57

## 2021-10-18 RX ADMIN — INSULIN GLARGINE 35 UNITS: 100 INJECTION, SOLUTION SUBCUTANEOUS at 08:19

## 2021-10-18 RX ADMIN — ALBUTEROL SULFATE 2 PUFF: 90 AEROSOL, METERED RESPIRATORY (INHALATION) at 17:31

## 2021-10-18 RX ADMIN — UMECLIDINIUM 1 PUFF: 62.5 AEROSOL, POWDER ORAL at 08:15

## 2021-10-18 RX ADMIN — PAROXETINE HYDROCHLORIDE 40 MG: 20 TABLET, FILM COATED ORAL at 21:56

## 2021-10-18 RX ADMIN — LORAZEPAM 1 MG: 1 TABLET ORAL at 21:56

## 2021-10-18 RX ADMIN — BRIMONIDINE TARTRATE 1 DROP: 2 SOLUTION OPHTHALMIC at 20:24

## 2021-10-18 RX ADMIN — INSULIN ASPART 1 UNITS: 100 INJECTION, SOLUTION INTRAVENOUS; SUBCUTANEOUS at 08:21

## 2021-10-18 RX ADMIN — BRIMONIDINE TARTRATE 1 DROP: 2 SOLUTION OPHTHALMIC at 08:19

## 2021-10-18 ASSESSMENT — ACTIVITIES OF DAILY LIVING (ADL)
ADLS_ACUITY_SCORE: 14

## 2021-10-18 ASSESSMENT — MIFFLIN-ST. JEOR: SCORE: 1530.12

## 2021-10-18 NOTE — PROGRESS NOTES
[facility]           Assessment & Plan      Remingotn Gutierrez is a 75 year old male admitted on 10/8/2021. [facility] with history of COPD, systolic heart failure, S/P AICD, type 2 diabetes, and anxiety/depression. He presented with increasing shortness of breath for 4 days, especially with activity and lying down, nonproductive cough, and was found to be COVID positive.     # Acute Hypoxic Respiratory Failure secondary to COVID-19 infection  # Viral Pneumonia secondary to COVID-19 infection     Symptom Onset 10/4/2021   Date of 1st Positive Test 10/8/2021   Vaccination Status Partially Vaccinated - 1st dose < 1 week prior to onset of symptoms             Initial chest x-ray demonstrated bilateral interstitial and groundglass opacities consistent with pneumonia.  He initially required oxygen at 5 L/min per nasal cannula, though he came more hypoxemic 10/9/2021, requiring the use of HFNC oxygen.  He remains on HFNC oxygen.  Flow has been increased to a peak of 60 L/min 10/14, down to 55 with FiO2 70% 10/15.   breathing slightly improved 10/15, exertional dyspnea remains fairly severe.  Cough is now not bothersome, and is nonproductive.  He has no respiratory chest pain.  He remains afebrile.      oxygen needs slightly increased 10/16 - Chest x-ray showed COVID with possible area of lung injury right base, however CT chest just looked like COVID without any focal changes in that area.   Diuresing starting 10/16 as below.  Procalcitonin very low risk.  Continue diuresis 10/17, slowed to 20 IV daily.       - Oxygen: 10/14/21 -- Continue HFNC 60 L/min with FiO2 0.70, and titrate to keep SpO2 between 90-96%.  Although these current oxygen needs would typically prompt ICU transfer, the patient has been clinically stable.  10/15/2021 -- 55 at 70%, 10/16/2021 -- up to 60L/min 70-80%, transferred to ICU.  10/18 down to 50L @ 55%.    - Labs:   .0 --> 100.0 --> 50.5 --> 34.3  --> 16.2 --> 4.4 --> normal     --> 714 --> 637 --> 660  fibrinogen 633 --> 677 --> 668 --> 650 --> 556  d-Dimer 1.43 --> 1.17 --> 0.67 --> 0.64 --> 0.61 --> 0.85.    Trend toward improvement continues, and some values are approaching normal.  We have discontinued daily monitoring.    - Imaging: No additional imaging necessary.  - Breathing treatments:  Continue albuterol HFA and home tiotropium inhalers as below; avoid nebulizers in favor of MDIs.  - IV fluids: None currently indicated.  - Antibiotics: Not indicated.  - COVID-Focused Medications:   Dexamethasone 6 mg  X 10 days started on 10/8/2021. remdesivir was eventually started 10/10/2021 once renal function improved.  Completed 5 day course.  Tocilizumab 600 mg IV given 10/10/2021 after discussion with Dr. Tapia, infectious disease.    - DVT Prophylaxis:   On admission started heparin 5000 units every 8 hours due to TANIA, which was then changed to a low intensity heparin infusion when hypoxemia worsened.  Kidney injury subsequently improved, and the patient was clinically stable in terms of O2 needs, so heparin was discontinued and enoxaparin 0.5 mg/kg subcu every 12 hours started and continues.  Consider anticoag on discharge for 30 days & until return to normal mobility      Acute Kidney Injury  Chronic kidney disease stage 2  Baseline GFR appears to be 60-65.  Admission GFR 29, likely representing acute kidney injury due to volume depletion from acute illness.  He received a 500 mL IV fluid bolus in ED, and preadmission lisinopril, furosemide, and Mobic were held.  Furosemide was resumed 10/12/2021. There was a gradual improvement in GFR: 29 --> 33 --> 38 --> 49 --> 60 -->64 as of 10/15/2021.    -Lisinopril held, reassess 10/16   -Preadmission furosemide 20 mg p.o. twice daily was resumed 10/12/2021 due to underlying systolic congestive heart failure.  However, creatinine worsened with this so was again held 10/14.  Resumed lasix but at 20 IV twice  daily 10/16.  -Continue to hold Mobic until necessary for symptom control.     Acute encephalopathy due to COVID-19 and corticosteroids  Acute toxic encephalopathy due to zolpidem 10/12 - 10/13/2021  Possible mild cognitive impairment/dementia  Noted to have some cognitive impairment on chart review, but not quantified, and no specific diagnosis has been made.  He was more acutely confused in the ER, pulling out IV's x 2 and removing his oxygen.  However, at time of admission H+P he was improved and appeared at cognitive baseline.  There had been no additional confusion or agitation until the early morning hours of 10/13/2021, at which time the patient was noted to be confused, agitated, and taking out his HFNC cannula.  He had gotten zolpidem 6.25 mg p.o. at bedtime, not a new medication for him, though I thought maybe the combination of zolpidem and dexamethasone was enough to cause the morning episode.  I stopped zolpidem 10/13/2021, and that night gave him ramelteon 8 mg p.o. at at bedtime x1.  He says that he slept poorly.  However, even though he did not get zolpidem last night, he is still more confused this morning than previous mornings, easily redirectable verbally, and only mildly agitated (shifting the bed clothes frequently, changing the oxygen tubing from side to side, etc.).  As result, I do not think we can blame zolpidem for his mild encephalopathy.  It is more likely COVID-19 and corticosteroid effect.  -Zolpidem was discontinued.  -Ramelteon 8 mg p.o. was ineffective last night; patient does not wish to continue trying it.  -tried lorazepam 1 mg p.o. at at bedtime 10/14 and patient did much better with this.   Continued for now.    -encephalopathy appears resolved 10/15. Follow.       Chronic Systolic Congestive Heart Failure   Dilated Cardiomyopathy  Noted to have history of systolic CHF and dilated cardiomyopathy. Takes lasix 20 mg twice daily prn at baseline.  [facility]. Non-ishemic  cardiomyopathy. ECHO 4/2021 - EF 25-30%. Severe LV dilation. Global hypokinesis. RVSF mildly reduced. Cardiac MRI 2/2021- c/w Non-ishemic cardiomyopathy. Ascending aorta 4.1 cm. Admitted with acute CHF [facility] 9/7-12/2021. Dry weight 202# per [facility] discharge summary 9/2021     On admission BNP elevated 3,907 (similar to 7/2021 ED visit for CHF/COPD). CXR on admission consistent with COVID pneumonia but not CHF.  Echocardiogram performed 10/11/2021 demonstrated a moderately dilated left ventricle, with severely reduced left ventricular systolic function, ejection fraction 30 to 35%.  Global hypokinesis was seen.  There was also grade 2 or moderate diastolic dysfunction noted.  On exam 10/14, there is no suggestion of pulmonary edema, and he has no peripheral edema.  As described above, there has been a slight decline in GFR from 10/13=10/14.  once again held preadmission furosemide 20 mg p.o. twice daily as a result.     -Lisinopril remains on hold.     - suspect at least some degree of pulmonary edema contributing here - resumed lasix at 20 mg IV twice daily 10/16, slowed to once daily 10/17, then stopped 10/18 due to mild TANIA.    -Follow daily weights and I/Os.        Essential hypertension   Managed prior to admission with metoprolol XL 25 mg daily and furosemide 20 mg p.o. twice daily.  Metoprolol had to be held 10/12/2021 due to bradycardia while also on remdesivir.  Furosemide as above.  Resumed metoprolol 10/16.    Blood pressure stable so far.      Bradycardia  The patient had heart rates intermittently as low as 38/min.  I am a little surprised to hear that he can achieve heart rates this low as he has a pacer/AICD device in place, which was functioning on admission EKG.  Nonetheless, it does not appear that he has had significant associated symptoms.  Bradycardia is probably a result of combined remdesivir and metoprolol therapy.  Because I would like for him to complete 5 days of remdesivir, I have put a  temporary hold on metoprolol.  -Hold metoprolol XL 25 mg daily while on remdesivir - completed 10/14. Heart rate still in 40's 10/15.  resumed metoprolol 10/16 but as 12.5 mg twice daily in place of 25 mg XL daily, with holding parameters.        COPD    Noted on chart review. PFTs unavailable. Not on home oxygen.  exam suggests mild to moderate expiratory phase prolongation, without wheeze.  -Continued preadmission albuterol scheduled 4 times daily and every 4 hours as needed.  -Continue preadmission tiotropium.  - on dexamethasone as above.  If not improving, consider resuming steroids in some form.       Elevated troponin - suspect strain (Type 2 MI) due to COVID infection  Initial troponin 0.107. ECG showed paced rhythm, limiting interpretation. Asymptomatic; patient reported no chest pain prior to admission or since admission.  Suspect this elevation represents strain related to COVID pneumonia and hypoxemic failure.  Next troponins were normal at 0.023 --> 0.019.  -No additional troponin monitoring planned.     Diabetes Mellitus, Type 2  Managed prior to admission with empagliflozin 12.5 mg daily, glipizide 5 mg daily, and metformin 500 mg twice daily.  These outpatient medications have been held since admission. Hemoglobin A1c 7.2 on 10/5/2021.  The patient has been hyperglycemic since dexamethasone was added.  He is currently receiving glargine 35 units subcu every morning, prandial NovoLog 6 units subcu 3 times daily AC, and a NovoLog medium insulin resistance sliding scale.  Glucose control has been generally good, 126 - 182 over the past 5 checks.  -Continue glargine 35 units subcu every morning.  -Continue prandial 6 units subcu 3 times daily AC.  -Continue NovoLog medium resistance sliding scale.  -We will hold empagliflozin, glipizide, and Metformin for now.  - reasonable control on this so far.       BENNY  Mild, but severe supine by sleep study 2008. Reports using CPAP at home   - Using oxygen/BIPAP  here so far     Anxiety and Depression  Insomnia  Previously diagnosed, and managed prior to admission with lamotrigine 150 mg twice daily and paroxetine 40 mg p.o. nightly, both continued here.  Mood and affect have been stable this hospital stay.    -Continue preadmission lamotrigine and paroxetine.  -Out of concern that zolpidem is causing morning agitated delirium, I stopped zolpidem on 10/13/2021, and gave him ramelteon 8 mg p.o. at at bedtime that night.  He slept poorly on ramelteon, and does not wish to continue using it.  did markedly better on lorazepam 1 mg p.o. nightly, with encephalopathy resolved as above.       Sinus node dysfunction   S/P AICD 5/4/2021  Paced rhythm noted on admission ECG.  I cannot find details in the patient's past medical records as to the properties of his implantable device.  However, it does appear to be a pacemaker based upon the appearance of his admission EKG.  As result, I am a bit surprised to see that he had heart rates as low as 38/min overnight, discussed under bradycardia above.       Pulmonary nodule  9 mm nodule seen on CT chest - recommend follow-up CT in 3 months.       Rosacea  Continue home doxycycline daily.       DVT Prophylaxis: Enoxaparin 0.5 mg subcu every 12 hours (HFNC).  Rose Catheter: Not present  Central Lines: None  Code Status: Full Code        Diet  Orders Placed This Encounter      Combination Diet Consistent Carb 75 Grams CHO per Meal Diet; Easy to Chew (level 7); Thin Liquids (level 0)                Disposition  Very slow improvement.  Continue ICU, anticipate at least 5 more days inpatient.            Interval History:   Patient feels like he's slowly improving.  No pain.  No fever or chills. No dyspnea on high flow oxygen.    No other pain               Review of Systems:    ROS: 10 point ROS neg other than the symptoms noted above in the HPI.           Medications:   Current active medications and PTA medications reviewed, see medication list  for details.            Physical Exam:   Vitals were reviewed  Patient Vitals for the past 24 hrs:   BP Temp Temp src Pulse Resp SpO2 Weight   10/18/21 1800 (!) 113/93 -- -- 76 26 92 % --   10/18/21 1600 105/78 -- -- 69 -- 95 % --   10/18/21 1445 111/66 -- -- 71 14 95 % --   10/18/21 1400 (!) 83/61 -- -- 66 25 94 % --   10/18/21 1200 117/61 -- -- 68 22 93 % --   10/18/21 0800 117/71 -- -- 74 17 91 % --   10/18/21 0740 -- 97.5  F (36.4  C) Axillary -- -- -- --   10/18/21 0500 -- 97.4  F (36.3  C) Axillary -- -- 91 % 82.7 kg (182 lb 5.1 oz)   10/18/21 0400 119/60 -- -- 72 14 98 % --   10/18/21 0200 121/67 -- -- 68 -- 94 % --   10/18/21 0000 110/67 97.5  F (36.4  C) Axillary 75 -- 95 % --   10/17/21 2200 96/70 -- -- 73 -- 94 % --   10/17/21 2000 123/79 -- -- 96 17 92 % --       Temperatures:  Current - Temp: 97.5  F (36.4  C); Max - Temp  Av.5  F (36.4  C)  Min: 97.4  F (36.3  C)  Max: 97.5  F (36.4  C)  Respiration range: Resp  Av.3  Min: 14  Max: 26  Pulse range: Pulse  Av.5  Min: 66  Max: 96  Blood pressure range: Systolic (24hrs), Av , Min:83 , Max:123   ; Diastolic (24hrs), Av, Min:60, Max:93    Pulse oximetry range: SpO2  Av.7 %  Min: 91 %  Max: 98 %  I/O last 3 completed shifts:  In: 830 [P.O.:830]  Out: 800 [Urine:800]    Intake/Output Summary (Last 24 hours) at 10/18/2021 1845  Last data filed at 10/18/2021 1700  Gross per 24 hour   Intake 450 ml   Output 500 ml   Net -50 ml     EXAM:  General: awake and alert, NAD, oriented x 3  Head: normocephalic  Neck: unremarkable, no lymphadenopathy   HEENT: oropharynx pink and moist    Heart: Regular rate and rhythm, no murmurs, rubs, or gallops  Lungs: clear to auscultation bilaterally with good air movement throughout  Abdomen: soft, non-tender, no masses or organomegaly  Extremities: no edema in lower extremities   Skin unremarkable.               Data:     Results for orders placed or performed during the hospital encounter of 10/08/21  (from the past 24 hour(s))   Glucose by meter   Result Value Ref Range    GLUCOSE BY METER POCT 153 (H) 70 - 99 mg/dL   Glucose by meter   Result Value Ref Range    GLUCOSE BY METER POCT 184 (H) 70 - 99 mg/dL   Glucose by meter   Result Value Ref Range    GLUCOSE BY METER POCT 148 (H) 70 - 99 mg/dL   Comprehensive metabolic panel   Result Value Ref Range    Sodium 136 133 - 144 mmol/L    Potassium 4.3 3.4 - 5.3 mmol/L    Chloride 105 94 - 109 mmol/L    Carbon Dioxide (CO2) 22 20 - 32 mmol/L    Anion Gap 9 3 - 14 mmol/L    Urea Nitrogen 50 (H) 7 - 30 mg/dL    Creatinine 1.38 (H) 0.66 - 1.25 mg/dL    Calcium 9.6 8.5 - 10.1 mg/dL    Glucose 154 (H) 70 - 99 mg/dL    Alkaline Phosphatase 132 40 - 150 U/L    AST 48 (H) 0 - 45 U/L    ALT 45 0 - 70 U/L    Protein Total 7.6 6.8 - 8.8 g/dL    Albumin 3.2 (L) 3.4 - 5.0 g/dL    Bilirubin Total 0.9 0.2 - 1.3 mg/dL    GFR Estimate 49 (L) >60 mL/min/1.73m2   CRP inflammation   Result Value Ref Range    CRP Inflammation <2.9 0.0 - 8.0 mg/L   Magnesium   Result Value Ref Range    Magnesium 2.2 1.6 - 2.3 mg/dL   Phosphorus   Result Value Ref Range    Phosphorus 4.3 2.5 - 4.5 mg/dL   Glucose by meter   Result Value Ref Range    GLUCOSE BY METER POCT 141 (H) 70 - 99 mg/dL   Blood gas venous   Result Value Ref Range    pH Venous 7.38 7.32 - 7.43    pCO2 Venous 45 40 - 50 mm Hg    pO2 Venous 13 (L) 25 - 47 mm Hg    Bicarbonate Venous 26 21 - 28 mmol/L    Base Excess/Deficit (+/-) 0.4 -7.7 - 1.9 mmol/L    FIO2 55    Extra Tube    Narrative    The following orders were created for panel order Extra Tube.  Procedure                               Abnormality         Status                     ---------                               -----------         ------                     Extra Green Top (Lithium...[[id number]]                      Final result                 Please view results for these tests on the individual orders.   Extra Green Top (Lithium Heparin) Tube   Result Value Ref Range     Hold Specimen [facility]    Glucose by meter   Result Value Ref Range    GLUCOSE BY METER POCT 120 (H) 70 - 99 mg/dL           Attestation:  I have reviewed today's vital signs, notes, medications, labs and imaging.  Amount of time spent in direct patient care providing critical care: 35 minutes.     Morgan Rollins MD, MD

## 2021-10-18 NOTE — PLAN OF CARE
Pt up to edge of bed eating meals, eating solids and drinking plenty of fluids. Pt able to use urinal standing at bedside with assist 1 staff. Uses call light appropriately. Denies pain. Tolerating HFNC at 50L and 55%, continues to desat when up or with any activity, rebounds back to low 90s% quickly. Call light in reach, bed alarm on and audible. Sia De RN BSN

## 2021-10-19 ENCOUNTER — APPOINTMENT (OUTPATIENT)
Dept: CT IMAGING | Facility: CLINIC | Age: 76
DRG: 177 | End: 2021-10-19
Attending: FAMILY MEDICINE
Payer: COMMERCIAL

## 2021-10-19 LAB
ALBUMIN SERPL-MCNC: 2.9 G/DL (ref 3.4–5)
ALP SERPL-CCNC: 116 U/L (ref 40–150)
ALT SERPL W P-5'-P-CCNC: 43 U/L (ref 0–70)
ANION GAP SERPL CALCULATED.3IONS-SCNC: 8 MMOL/L (ref 3–14)
AST SERPL W P-5'-P-CCNC: 37 U/L (ref 0–45)
BASE EXCESS BLDV CALC-SCNC: 0.1 MMOL/L (ref -7.7–1.9)
BILIRUB SERPL-MCNC: 0.7 MG/DL (ref 0.2–1.3)
BUN SERPL-MCNC: 48 MG/DL (ref 7–30)
CALCIUM SERPL-MCNC: 9.3 MG/DL (ref 8.5–10.1)
CHLORIDE BLD-SCNC: 109 MMOL/L (ref 94–109)
CO2 SERPL-SCNC: 22 MMOL/L (ref 20–32)
CREAT SERPL-MCNC: 1.26 MG/DL (ref 0.66–1.25)
CRP SERPL-MCNC: <2.9 MG/L (ref 0–8)
ERYTHROCYTE [DISTWIDTH] IN BLOOD BY AUTOMATED COUNT: 12.5 % (ref 10–15)
GFR SERPL CREATININE-BSD FRML MDRD: 55 ML/MIN/1.73M2
GLUCOSE BLD-MCNC: 65 MG/DL (ref 70–99)
GLUCOSE BLDC GLUCOMTR-MCNC: 103 MG/DL (ref 70–99)
GLUCOSE BLDC GLUCOMTR-MCNC: 127 MG/DL (ref 70–99)
GLUCOSE BLDC GLUCOMTR-MCNC: 210 MG/DL (ref 70–99)
GLUCOSE BLDC GLUCOMTR-MCNC: 52 MG/DL (ref 70–99)
GLUCOSE BLDC GLUCOMTR-MCNC: 55 MG/DL (ref 70–99)
GLUCOSE BLDC GLUCOMTR-MCNC: 61 MG/DL (ref 70–99)
GLUCOSE BLDC GLUCOMTR-MCNC: 64 MG/DL (ref 70–99)
GLUCOSE BLDC GLUCOMTR-MCNC: 76 MG/DL (ref 70–99)
GLUCOSE BLDC GLUCOMTR-MCNC: 91 MG/DL (ref 70–99)
GLUCOSE BLDC GLUCOMTR-MCNC: 97 MG/DL (ref 70–99)
HCO3 BLDV-SCNC: 27 MMOL/L (ref 21–28)
HCT VFR BLD AUTO: 49.1 % (ref 40–53)
HGB BLD-MCNC: 16.8 G/DL (ref 13.3–17.7)
MCH RBC QN AUTO: 32 PG (ref 26.5–33)
MCHC RBC AUTO-ENTMCNC: 34.2 G/DL (ref 31.5–36.5)
MCV RBC AUTO: 94 FL (ref 78–100)
O2/TOTAL GAS SETTING VFR VENT: 55 %
PCO2 BLDV: 48 MM HG (ref 40–50)
PH BLDV: 7.35 [PH] (ref 7.32–7.43)
PLATELET # BLD AUTO: 253 10E3/UL (ref 150–450)
PO2 BLDV: 17 MM HG (ref 25–47)
POTASSIUM BLD-SCNC: 3.8 MMOL/L (ref 3.4–5.3)
PROT SERPL-MCNC: 6.8 G/DL (ref 6.8–8.8)
RBC # BLD AUTO: 5.25 10E6/UL (ref 4.4–5.9)
SODIUM SERPL-SCNC: 139 MMOL/L (ref 133–144)
WBC # BLD AUTO: 9.9 10E3/UL (ref 4–11)

## 2021-10-19 PROCEDURE — 250N000011 HC RX IP 250 OP 636: Performed by: FAMILY MEDICINE

## 2021-10-19 PROCEDURE — 71260 CT THORAX DX C+: CPT

## 2021-10-19 PROCEDURE — 85027 COMPLETE CBC AUTOMATED: CPT | Performed by: FAMILY MEDICINE

## 2021-10-19 PROCEDURE — 86140 C-REACTIVE PROTEIN: CPT | Performed by: FAMILY MEDICINE

## 2021-10-19 PROCEDURE — 200N000001 HC R&B ICU

## 2021-10-19 PROCEDURE — 250N000013 HC RX MED GY IP 250 OP 250 PS 637

## 2021-10-19 PROCEDURE — 82040 ASSAY OF SERUM ALBUMIN: CPT | Performed by: FAMILY MEDICINE

## 2021-10-19 PROCEDURE — 82803 BLOOD GASES ANY COMBINATION: CPT | Performed by: FAMILY MEDICINE

## 2021-10-19 PROCEDURE — 999N000157 HC STATISTIC RCP TIME EA 10 MIN

## 2021-10-19 PROCEDURE — 999N000123 HC STATISTIC OXYGEN O2DAILY TECH TIME

## 2021-10-19 PROCEDURE — 999N000215 HC STATISTIC HFNC ADULT NON-CPAP

## 2021-10-19 PROCEDURE — 36415 COLL VENOUS BLD VENIPUNCTURE: CPT | Performed by: FAMILY MEDICINE

## 2021-10-19 PROCEDURE — 999N000185 HC STATISTIC TRANSPORT TIME EA 15 MIN

## 2021-10-19 PROCEDURE — 250N000009 HC RX 250: Performed by: FAMILY MEDICINE

## 2021-10-19 PROCEDURE — 250N000013 HC RX MED GY IP 250 OP 250 PS 637: Performed by: FAMILY MEDICINE

## 2021-10-19 PROCEDURE — 250N000011 HC RX IP 250 OP 636

## 2021-10-19 PROCEDURE — 99291 CRITICAL CARE FIRST HOUR: CPT | Performed by: FAMILY MEDICINE

## 2021-10-19 RX ORDER — BISACODYL 10 MG
10 SUPPOSITORY, RECTAL RECTAL DAILY PRN
Status: DISCONTINUED | OUTPATIENT
Start: 2021-10-19 | End: 2021-10-25 | Stop reason: HOSPADM

## 2021-10-19 RX ORDER — IOPAMIDOL 755 MG/ML
84 INJECTION, SOLUTION INTRAVASCULAR ONCE
Status: COMPLETED | OUTPATIENT
Start: 2021-10-19 | End: 2021-10-19

## 2021-10-19 RX ADMIN — SODIUM PHOSPHATE 1 ENEMA: 7; 19 ENEMA RECTAL at 16:53

## 2021-10-19 RX ADMIN — SIMVASTATIN 20 MG: 20 TABLET, FILM COATED ORAL at 21:27

## 2021-10-19 RX ADMIN — ALBUTEROL SULFATE 2 PUFF: 90 INHALANT RESPIRATORY (INHALATION) at 21:22

## 2021-10-19 RX ADMIN — DOXYCYCLINE HYCLATE 50 MG: 50 CAPSULE ORAL at 08:54

## 2021-10-19 RX ADMIN — POLYETHYLENE GLYCOL 3350 17 G: 17 POWDER, FOR SOLUTION ORAL at 14:09

## 2021-10-19 RX ADMIN — UMECLIDINIUM 1 PUFF: 62.5 AEROSOL, POWDER ORAL at 08:55

## 2021-10-19 RX ADMIN — ENOXAPARIN SODIUM 40 MG: 100 INJECTION SUBCUTANEOUS at 08:54

## 2021-10-19 RX ADMIN — PAROXETINE HYDROCHLORIDE 40 MG: 20 TABLET, FILM COATED ORAL at 21:26

## 2021-10-19 RX ADMIN — ALBUTEROL SULFATE 2 PUFF: 90 AEROSOL, METERED RESPIRATORY (INHALATION) at 08:55

## 2021-10-19 RX ADMIN — ALBUTEROL SULFATE 2 PUFF: 90 AEROSOL, METERED RESPIRATORY (INHALATION) at 17:06

## 2021-10-19 RX ADMIN — ENOXAPARIN SODIUM 40 MG: 100 INJECTION SUBCUTANEOUS at 21:24

## 2021-10-19 RX ADMIN — SODIUM CHLORIDE 61 ML: 9 INJECTION, SOLUTION INTRAVENOUS at 19:32

## 2021-10-19 RX ADMIN — ALBUTEROL SULFATE 2 PUFF: 90 AEROSOL, METERED RESPIRATORY (INHALATION) at 11:59

## 2021-10-19 RX ADMIN — DEXTROSE 15 G: 15 GEL ORAL at 09:05

## 2021-10-19 RX ADMIN — LAMOTRIGINE 150 MG: 100 TABLET ORAL at 21:27

## 2021-10-19 RX ADMIN — BISACODYL 10 MG: 10 SUPPOSITORY RECTAL at 15:23

## 2021-10-19 RX ADMIN — ACETAMINOPHEN 650 MG: 325 TABLET, FILM COATED ORAL at 14:09

## 2021-10-19 RX ADMIN — Medication 12.5 MG: at 08:54

## 2021-10-19 RX ADMIN — MAGNESIUM HYDROXIDE 30 ML: 400 SUSPENSION ORAL at 17:40

## 2021-10-19 RX ADMIN — IOPAMIDOL 84 ML: 755 INJECTION, SOLUTION INTRAVENOUS at 19:32

## 2021-10-19 RX ADMIN — INSULIN ASPART 2 UNITS: 100 INJECTION, SOLUTION INTRAVENOUS; SUBCUTANEOUS at 11:59

## 2021-10-19 RX ADMIN — LAMOTRIGINE 150 MG: 100 TABLET ORAL at 08:54

## 2021-10-19 RX ADMIN — SENNOSIDES AND DOCUSATE SODIUM 2 TABLET: 50; 8.6 TABLET ORAL at 21:26

## 2021-10-19 RX ADMIN — BRIMONIDINE TARTRATE 1 DROP: 2 SOLUTION OPHTHALMIC at 21:22

## 2021-10-19 RX ADMIN — LORAZEPAM 1 MG: 1 TABLET ORAL at 21:27

## 2021-10-19 RX ADMIN — BRIMONIDINE TARTRATE 1 DROP: 2 SOLUTION OPHTHALMIC at 08:56

## 2021-10-19 RX ADMIN — DEXTROSE 15 G: 15 GEL ORAL at 08:47

## 2021-10-19 ASSESSMENT — ACTIVITIES OF DAILY LIVING (ADL)
ADLS_ACUITY_SCORE: 14
ADLS_ACUITY_SCORE: 14
ADLS_ACUITY_SCORE: 10
ADLS_ACUITY_SCORE: 14
ADLS_ACUITY_SCORE: 10
ADLS_ACUITY_SCORE: 10
ADLS_ACUITY_SCORE: 14
ADLS_ACUITY_SCORE: 10
ADLS_ACUITY_SCORE: 14

## 2021-10-19 ASSESSMENT — MIFFLIN-ST. JEOR: SCORE: 1492.12

## 2021-10-19 NOTE — PROGRESS NOTES
MD returned page from bedside RN. Verbal order obtained for a pink lady enema but MD wants a CT chest/abdomen/pelvis with contrast to be ordered if there are no results obtained. Message relayed to Candace WALTERS, bedside RN.

## 2021-10-19 NOTE — PROGRESS NOTES
Pt again setting off bed alarm, stating he has to urinate. Assisted to standing position to urinate. Pt voided approximately 100 ml of jesse clear urine and then started to pass out again, requiring 2 staff members to fully assist him to lie in his bed. Pt has a permanent pacemaker, but his perfusing radial pulse is 30-40.

## 2021-10-19 NOTE — PROGRESS NOTES
Currie catheter placed about an hour ago after patient c/o need to urinate and attempting x2 but unable, then bladder scanned for 408 mL.  Upon insertion drained 400 mL urine out.  Patient relentlessly c/o need to urinate and need to defecate ever since currie placed despite numerous attempts to remind him the catheter is draining his urine.  Paged provider for order for enema as patient was insistent on one and seemed to be trying to have a BM but wasn't able to.  This went on for 10-15 minutes until when next person answered his call light he was then fixated on need to urinate.  I paged provider again requesting maybe Ativan and/or a medication for bladder spasms.  Didn't hear back re: either of these pages.  Patient a high falls risk and so I discussed with oncoming RN the status of the pages sent out and possibility of just removing the catheter again as this is what they had to resort to last night when patient had similar issues when they placed a currie catheter.

## 2021-10-19 NOTE — PROGRESS NOTES
[facility]ist Progress Note           Assessment & Plan        Remington Gutierrez is a 75 year old male admitted on 10/8/2021. [facility] with history of COPD, systolic heart failure, S/P AICD, type 2 diabetes, and anxiety/depression. He presented with increasing shortness of breath for 4 days, especially with activity and lying down, nonproductive cough, and was found to be COVID positive.     # Acute Hypoxic Respiratory Failure secondary to COVID-19 infection  # Viral Pneumonia secondary to COVID-19 infection     Symptom Onset 10/4/2021   Date of 1st Positive Test 10/8/2021   Vaccination Status Partially Vaccinated - 1st dose < 1 week prior to onset of symptoms             Initial chest x-ray demonstrated bilateral interstitial and groundglass opacities consistent with pneumonia.  He initially required oxygen at 5 L/min per nasal cannula, though he came more hypoxemic 10/9/2021, requiring the use of HFNC oxygen.  He remains on HFNC oxygen.  Flow has been increased to a peak of 60 L/min 10/14, down to 55 with FiO2 70% 10/15.   breathing slightly improved 10/15, exertional dyspnea remains fairly severe.  Cough is now not bothersome, and is nonproductive.  He has no respiratory chest pain.  He remains afebrile.      oxygen needs slightly increased 10/16 - Chest x-ray showed COVID with possible area of lung injury right base, however CT chest just looked like COVID without any focal changes in that area.   Diuresing starting 10/16 as below.  Procalcitonin very low risk.  Continue diuresis 10/17, slowed to 20 IV daily.  lasix held 10/18.     - Oxygen: 10/14/21 -- Continue HFNC 60 L/min with FiO2 0.70, and titrate to keep SpO2 between 90-96%.  Although these current oxygen needs would typically prompt ICU transfer, the patient has been clinically stable.  10/15/2021 -- 55 at 70%, 10/16/2021 -- up to 60L/min 70-80%, transferred to ICU.  10/18 down to 50L @ 55%.  10/19 back up to 55L 70%  - Labs:    .0 --> 100.0 --> 50.5 --> 34.3 --> 16.2 --> 4.4 --> normal     --> 714 --> 637 --> 660  fibrinogen 633 --> 677 --> 668 --> 650 --> 556  d-Dimer 1.43 --> 1.17 --> 0.67 --> 0.64 --> 0.61 --> 0.85.    Trend toward improvement continues, and some values are approaching normal.  We have discontinued daily monitoring.  however, oxygen needs increased 10/19 as above. Checking CT chest/abd as below to rule out superimposed process.      - Imaging: No additional imaging necessary.  - Breathing treatments:  Continue albuterol HFA and home tiotropium inhalers as below; avoid nebulizers in favor of MDIs.  - IV fluids: None currently indicated.  - Antibiotics: Not indicated.  - COVID-Focused Medications:   Dexamethasone 6 mg  X 10 days started on 10/8/2021. remdesivir was eventually started 10/10/2021 once renal function improved.  Completed 5 day course.  Tocilizumab 600 mg IV given 10/10/2021 after discussion with Dr. Tapia, infectious disease.    - DVT Prophylaxis:   On admission started heparin 5000 units every 8 hours due to TANIA, which was then changed to a low intensity heparin infusion when hypoxemia worsened.  Kidney injury subsequently improved, and the patient was clinically stable in terms of O2 needs, so heparin was discontinued and enoxaparin 0.5 mg/kg subcu every 12 hours started and continues.  Consider anticoag on discharge for 30 days & until return to normal mobility      Acute Kidney Injury  Chronic kidney disease stage 2  Baseline GFR appears to be 60-65.  Admission GFR 29, likely representing acute kidney injury due to volume depletion from acute illness.  He received a 500 mL IV fluid bolus in ED, and preadmission lisinopril, furosemide, and Mobic were held.  Furosemide was resumed 10/12/2021. There was a gradual improvement in GFR: 29 --> 33 --> 38 --> 49 --> 60 -->64 as of 10/15/2021.    -Lisinopril held, reassess 10/16   -Preadmission furosemide 20 mg p.o. twice  daily was resumed 10/12/2021 due to underlying systolic congestive heart failure.  However, creatinine worsened with this so was again held 10/14.  Resumed lasix but at 20 IV twice daily 10/16, creatinine up 10/18 so lasix held, creatinine improving 10/19.    -Continue to hold Mobic until necessary for symptom control.     Acute encephalopathy due to COVID-19 and corticosteroids  Acute toxic encephalopathy due to zolpidem 10/12 - 10/13/2021  Possible mild cognitive impairment/dementia  Noted to have some cognitive impairment on chart review, but not quantified, and no specific diagnosis has been made.  He was more acutely confused in the ER, pulling out IV's x 2 and removing his oxygen.  However, at time of admission H+P he was improved and appeared at cognitive baseline.  There had been no additional confusion or agitation until the early morning hours of 10/13/2021, at which time the patient was noted to be confused, agitated, and taking out his HFNC cannula.  He had gotten zolpidem 6.25 mg p.o. at bedtime, not a new medication for him, though I thought maybe the combination of zolpidem and dexamethasone was enough to cause the morning episode.  I stopped zolpidem 10/13/2021, and that night gave him ramelteon 8 mg p.o. at at bedtime x1.  He says that he slept poorly.  However, even though he did not get zolpidem last night, he is still more confused this morning than previous mornings, easily redirectable verbally, and only mildly agitated (shifting the bed clothes frequently, changing the oxygen tubing from side to side, etc.).  As result, I do not think we can blame zolpidem for his mild encephalopathy.  It is more likely COVID-19 and corticosteroid effect.  -Zolpidem was discontinued.  -Ramelteon 8 mg p.o. was ineffective last night; patient does not wish to continue trying it.  -tried lorazepam 1 mg p.o. at at bedtime 10/14 and patient did much better with this.   Continued for now.    -encephalopathy appears  resolved 10/15. Patient somewhat agitated 10/19 with concerns about feeling constipated, but not encephalopathic.       Chronic Systolic Congestive Heart Failure   Dilated Cardiomyopathy  Noted to have history of systolic CHF and dilated cardiomyopathy. Takes lasix 20 mg twice daily prn at baseline.  [facility]. Non-ishemic cardiomyopathy. ECHO 4/2021 - EF 25-30%. Severe LV dilation. Global hypokinesis. RVSF mildly reduced. Cardiac MRI 2/2021- c/w Non-ishemic cardiomyopathy. Ascending aorta 4.1 cm. Admitted with acute CHF [facility] 9/7-12/2021. Dry weight 202# per [facility] discharge summary 9/2021     On admission BNP elevated 3,907 (similar to 7/2021 ED visit for CHF/COPD). CXR on admission consistent with COVID pneumonia but not CHF.  Echocardiogram performed 10/11/2021 demonstrated a moderately dilated left ventricle, with severely reduced left ventricular systolic function, ejection fraction 30 to 35%.  Global hypokinesis was seen.  There was also grade 2 or moderate diastolic dysfunction noted.  On exam 10/14, there is no suggestion of pulmonary edema, and he has no peripheral edema.  As described above, there has been a slight decline in GFR from 10/13=10/14.  once again held preadmission furosemide 20 mg p.o. twice daily as a result.     -Lisinopril remains on hold.     - suspect at least some degree of pulmonary edema contributing here - resumed lasix at 20 mg IV twice daily 10/16, slowed to once daily 10/17, then stopped 10/18 due to mild TANIA.    -Follow daily weights and I/Os.        Abdominal discomfort- suspect due to constipation and urinary retention  Patient reporting some intermittent abdominal cramping 10/19, benign exam.  Found to have some urinary retention, but after currie resolved this patient still felt like he had to have a bowel movement and like he was severely constipated but has been unable to have a bowel movement despite dulcolax suppository and fleet enema, requesting pink lady enema  which I think is reasonable but will also check CT abdomen/pelvis and chest given increased oxygen needs as above to rule out other intra-abdominal process.    Essential hypertension   Managed prior to admission with metoprolol XL 25 mg daily and furosemide 20 mg p.o. twice daily.  Metoprolol had to be held 10/12/2021 due to bradycardia while also on remdesivir.  Furosemide as above.  Resumed metoprolol 10/16.    Blood pressure stable so far.      Bradycardia  The patient had heart rates intermittently as low as 38/min.  I am a little surprised to hear that he can achieve heart rates this low as he has a pacer/AICD device in place, which was functioning on admission EKG.  Nonetheless, it does not appear that he has had significant associated symptoms.  Bradycardia is probably a result of combined remdesivir and metoprolol therapy.  Because I would like for him to complete 5 days of remdesivir, I have put a temporary hold on metoprolol.  -Hold metoprolol XL 25 mg daily while on remdesivir - completed 10/14. Heart rate still in 40's 10/15.  resumed metoprolol 10/16 but as 12.5 mg twice daily in place of 25 mg XL daily, with holding parameters.        COPD    Noted on chart review. PFTs unavailable. Not on home oxygen.  exam suggests mild to moderate expiratory phase prolongation, without wheeze.  -Continued preadmission albuterol scheduled 4 times daily and every 4 hours as needed.  -Continue preadmission tiotropium.  - on dexamethasone as above.  If not improving, consider resuming steroids in some form.       Elevated troponin - suspect strain (Type 2 MI) due to COVID infection  Initial troponin 0.107. ECG showed paced rhythm, limiting interpretation. Asymptomatic; patient reported no chest pain prior to admission or since admission.  Suspect this elevation represents strain related to COVID pneumonia and hypoxemic failure.  Next troponins were normal at 0.023 --> 0.019.  -No additional troponin monitoring  planned.     Diabetes Mellitus, Type 2  Managed prior to admission with empagliflozin 12.5 mg daily, glipizide 5 mg daily, and metformin 500 mg twice daily.  These outpatient medications have been held since admission. Hemoglobin A1c 7.2 on 10/5/2021.  The patient has been hyperglycemic since dexamethasone was added.  He is currently receiving glargine 35 units subcu every morning, prandial NovoLog 6 units subcu 3 times daily AC, and a NovoLog medium insulin resistance sliding scale.  Glucose control has been generally good, 126 - 182 over the past 5 checks.  -Continue glargine 35 units subcu every morning - decreased to 25u due to low glucose AM of 10/19  -Continue prandial 6 units subcu 3 times daily AC.  -Continue NovoLog medium resistance sliding scale.  -We will hold empagliflozin, glipizide, and Metformin for now.  - reasonable control on this so far.       BENNY  Mild, but severe supine by sleep study 2008. Reports using CPAP at home   - Using oxygen/BIPAP here so far     Anxiety and Depression  Insomnia  Previously diagnosed, and managed prior to admission with lamotrigine 150 mg twice daily and paroxetine 40 mg p.o. nightly, both continued here.  Mood and affect have been stable this hospital stay.    -Continue preadmission lamotrigine and paroxetine.  -Out of concern that zolpidem is causing morning agitated delirium, I stopped zolpidem on 10/13/2021, and gave him ramelteon 8 mg p.o. at at bedtime that night.  He slept poorly on ramelteon, and does not wish to continue using it.  did markedly better on lorazepam 1 mg p.o. nightly, with encephalopathy resolved as above.       Sinus node dysfunction   S/P AICD 5/4/2021  Paced rhythm noted on admission ECG.  I cannot find details in the patient's past medical records as to the properties of his implantable device.  However, it does appear to be a pacemaker based upon the appearance of his admission EKG.  As result, I am a bit surprised to see that he had heart  rates as low as 38/min overnight, discussed under bradycardia above.       Pulmonary nodule  9 mm nodule seen on CT chest - recommend follow-up CT in 3 months.       Rosacea  Continue home doxycycline daily.       DVT Prophylaxis: Enoxaparin 0.5 mg subcu every 12 hours (HFNC).  Currie Catheter: Not present  Central Lines: None  Code Status: Full Code           Diet  Orders Placed This Encounter      Combination Diet Consistent Carb 75 Grams CHO per Meal Diet; Easy to Chew (level 7); Thin Liquids (level 0)                      Disposition  Continue ICU, anticipate at least 5 more days inpatient             Interval History:   Patient feels about the same with regard to breathing, but intermittently needing higher FiO2, up to 70 % from 50% yesterday.  No pain, although says abdomen feels full.  Later in day, patient had trouble voiding, had > 400cc on bladder scan and had currie placed with > 400 cc urine output.  However, he still felt constipated after this and feels like he really needs to have a bowel movement.  Not reporting abdominal pain really, but is reporting fullness and intermittent cramping.  No nausea or vomiting.  No fever or chills. No chest pain.   No other pain             Review of Systems:    ROS: 10 point ROS neg other than the symptoms noted above in the HPI.           Medications:   Current active medications and PTA medications reviewed, see medication list for details.            Physical Exam:   Vitals were reviewed  Patient Vitals for the past 24 hrs:   BP Temp Temp src Pulse Resp SpO2 Weight   10/19/21 1806 106/66 -- -- 64 16 -- --   10/19/21 1600 104/72 97.9  F (36.6  C) Oral 63 17 93 % --   10/19/21 1500 -- -- -- 68 16 94 % --   10/19/21 1215 -- 98  F (36.7  C) Oral -- -- 93 % --   10/19/21 1200 (!) 86/65 -- -- 61 21 98 % --   10/19/21 1054 -- 98.4  F (36.9  C) Oral -- -- -- --   10/19/21 0800 101/70 -- -- (!) 44 28 94 % --   10/19/21 0728 -- 97.4  F (36.3  C) Oral -- -- -- --   10/19/21  0635 -- -- -- 87 17 94 % --   10/19/21 0600 (!) 112/90 -- -- 83 24 (!) 75 % 78.9 kg (173 lb 15.1 oz)   10/19/21 0400 101/78 -- -- 58 21 96 % --   10/19/21 0300 -- -- -- 72 -- 95 % --   10/19/21 0200 120/88 -- -- 66 18 (!) 78 % --   10/19/21 0010 125/67 -- -- 89 -- -- --   10/18/21 2356 -- -- -- -- -- 95 % --   10/18/21 2353 (!) 112/96 97.9  F (36.6  C) -- 75 20 94 % --   10/18/21 2200 106/66 -- -- 77 15 96 % --   10/18/21 2032 -- 97.6  F (36.4  C) Temporal -- -- -- --   10/18/21 2016 132/70 -- -- -- -- -- --   10/18/21 2015 132/70 -- -- 76 15 (!) 89 % --   10/18/21 2012 (!) 122/108 -- -- 93 19 (!) 77 % --   10/18/21 2000 -- -- -- 80 -- -- --       Temperatures:  Current - Temp: 97.9  F (36.6  C); Max - Temp  Av.9  F (36.6  C)  Min: 97.4  F (36.3  C)  Max: 98.4  F (36.9  C)  Respiration range: Resp  Av  Min: 15  Max: 28  Pulse range: Pulse  Av.3  Min: 44  Max: 93  Blood pressure range: Systolic (24hrs), Av , Min:86 , Max:132   ; Diastolic (24hrs), Av, Min:65, Max:108    Pulse oximetry range: SpO2  Av.7 %  Min: 75 %  Max: 98 %  I/O last 3 completed shifts:  In: 440 [P.O.:440]  Out: 1400 [Urine:1400]    Intake/Output Summary (Last 24 hours) at 10/19/2021 1821  Last data filed at 10/19/2021 1600  Gross per 24 hour   Intake 560 ml   Output 1300 ml   Net -740 ml     EXAM:  General: awake and alert, NAD, oriented x 3  Head: normocephalic  Neck: unremarkable, no lymphadenopathy   HEENT: oropharynx pink and moist    Heart: Regular rate and rhythm, no murmurs, rubs, or gallops  Lungs: slight wheezing, clear to auscultation bilaterally with good air movement throughout  Abdomen: soft, minimal tenderness across mid abdomen, no rebound or guarding, bowel sounds present, certainly no rebound or guarding, non-distended, no masses or organomegaly  Extremities: no edema in lower extremities   Skin unremarkable.               Data:     Results for orders placed or performed during the hospital encounter  of 10/08/21 (from the past 24 hour(s))   Glucose by meter   Result Value Ref Range    GLUCOSE BY METER POCT 150 (H) 70 - 99 mg/dL   Glucose by meter   Result Value Ref Range    GLUCOSE BY METER POCT 91 70 - 99 mg/dL   Blood gas venous   Result Value Ref Range    pH Venous 7.35 7.32 - 7.43    pCO2 Venous 48 40 - 50 mm Hg    pO2 Venous 17 (L) 25 - 47 mm Hg    Bicarbonate Venous 27 21 - 28 mmol/L    Base Excess/Deficit (+/-) 0.1 -7.7 - 1.9 mmol/L    FIO2 55    CRP inflammation   Result Value Ref Range    CRP Inflammation <2.9 0.0 - 8.0 mg/L   Comprehensive metabolic panel   Result Value Ref Range    Sodium 139 133 - 144 mmol/L    Potassium 3.8 3.4 - 5.3 mmol/L    Chloride 109 94 - 109 mmol/L    Carbon Dioxide (CO2) 22 20 - 32 mmol/L    Anion Gap 8 3 - 14 mmol/L    Urea Nitrogen 48 (H) 7 - 30 mg/dL    Creatinine 1.26 (H) 0.66 - 1.25 mg/dL    Calcium 9.3 8.5 - 10.1 mg/dL    Glucose 65 (L) 70 - 99 mg/dL    Alkaline Phosphatase 116 40 - 150 U/L    AST 37 0 - 45 U/L    ALT 43 0 - 70 U/L    Protein Total 6.8 6.8 - 8.8 g/dL    Albumin 2.9 (L) 3.4 - 5.0 g/dL    Bilirubin Total 0.7 0.2 - 1.3 mg/dL    GFR Estimate 55 (L) >60 mL/min/1.73m2   CBC with platelets   Result Value Ref Range    WBC Count 9.9 4.0 - 11.0 10e3/uL    RBC Count 5.25 4.40 - 5.90 10e6/uL    Hemoglobin 16.8 13.3 - 17.7 g/dL    Hematocrit 49.1 40.0 - 53.0 %    MCV 94 78 - 100 fL    MCH 32.0 26.5 - 33.0 pg    MCHC 34.2 31.5 - 36.5 g/dL    RDW 12.5 10.0 - 15.0 %    Platelet Count 253 150 - 450 10e3/uL   Glucose by meter   Result Value Ref Range    GLUCOSE BY METER POCT 55 (L) 70 - 99 mg/dL   Glucose by meter   Result Value Ref Range    GLUCOSE BY METER POCT 61 (L) 70 - 99 mg/dL   Glucose by meter   Result Value Ref Range    GLUCOSE BY METER POCT 52 (L) 70 - 99 mg/dL   Glucose by meter   Result Value Ref Range    GLUCOSE BY METER POCT 76 70 - 99 mg/dL   Glucose by meter   Result Value Ref Range    GLUCOSE BY METER POCT 127 (H) 70 - 99 mg/dL   Glucose by meter    Result Value Ref Range    GLUCOSE BY METER POCT 210 (H) 70 - 99 mg/dL   Glucose by meter   Result Value Ref Range    GLUCOSE BY METER POCT 103 (H) 70 - 99 mg/dL           Attestation:  I have reviewed today's vital signs, notes, medications, labs and imaging.  Amount of time spent in direct patient care providing critical care: 60 minutes.     Morgan Rollins MD, MD

## 2021-10-19 NOTE — PROGRESS NOTES
Pt appeared to sleep at long intervals after catheter, drained 500 ml of clear jesse urine, was removed from his bladder. Pt did not void again tonight. Pt continues on HFNC 55%/50L, keeping O2 sats in the 90's at rest. AM labs drawn and sent to lab, await results.

## 2021-10-19 NOTE — PROGRESS NOTES
Bed alarm sounding,pt found out of bed, stating he needed to go to the bathroom. Pt assisted to commode,was SOA, sats declining and pt leaning so far forward that he needed to be held up to prevent him from falling forward to the floor. Help arrived to steady the pt. After a while, pt voided a small amount, but had no BM. Pt assisted back to bed. O2 sats slowly rising on HFNC 55%/50L. Bed alarm rearmed.

## 2021-10-19 NOTE — PROGRESS NOTES
Pt unable to tolerate currie catheter, insisting he has to pee, becoming frantic with it in. Pt's bladder emptied of 500 ml jesse clear urine, before pt insisted it come out. Currie removed without difficulties, yet pt says he still has to pee.

## 2021-10-19 NOTE — PLAN OF CARE
Problem: Diabetes Comorbidity  Goal: Blood Glucose Level Within Targeted Range  Outcome: Improving    Hypoglycemic this morning with BG 65 @ 0630, went unnoticed until shift change when I asked the aide to give him a juice (0730).  Recheck @ 0808 was 55, given another orange juice and recheck @ 0824 was 61.  Patient then sitting up eating breakfast.  Rechecked @ 0843, BG was 52 so 15 g glucose gel given.  Recheck @ 0902 was 76.  Another 15 g glucose gel given and finally at 0926 BG is 127.  Will continue to watch closely.  Provider notified.

## 2021-10-20 LAB
ALBUMIN SERPL-MCNC: 3 G/DL (ref 3.4–5)
ALP SERPL-CCNC: 125 U/L (ref 40–150)
ALT SERPL W P-5'-P-CCNC: 42 U/L (ref 0–70)
ANION GAP SERPL CALCULATED.3IONS-SCNC: 9 MMOL/L (ref 3–14)
AST SERPL W P-5'-P-CCNC: 39 U/L (ref 0–45)
BASE EXCESS BLDV CALC-SCNC: 2.6 MMOL/L (ref -7.7–1.9)
BILIRUB SERPL-MCNC: 1.1 MG/DL (ref 0.2–1.3)
BUN SERPL-MCNC: 41 MG/DL (ref 7–30)
CALCIUM SERPL-MCNC: 9.9 MG/DL (ref 8.5–10.1)
CHLORIDE BLD-SCNC: 106 MMOL/L (ref 94–109)
CO2 SERPL-SCNC: 23 MMOL/L (ref 20–32)
CREAT SERPL-MCNC: 1.12 MG/DL (ref 0.66–1.25)
CRP SERPL-MCNC: 8.6 MG/L (ref 0–8)
ERYTHROCYTE [DISTWIDTH] IN BLOOD BY AUTOMATED COUNT: 12.5 % (ref 10–15)
GFR SERPL CREATININE-BSD FRML MDRD: 63 ML/MIN/1.73M2
GLUCOSE BLD-MCNC: 67 MG/DL (ref 70–99)
GLUCOSE BLDC GLUCOMTR-MCNC: 100 MG/DL (ref 70–99)
GLUCOSE BLDC GLUCOMTR-MCNC: 132 MG/DL (ref 70–99)
GLUCOSE BLDC GLUCOMTR-MCNC: 52 MG/DL (ref 70–99)
GLUCOSE BLDC GLUCOMTR-MCNC: 80 MG/DL (ref 70–99)
GLUCOSE BLDC GLUCOMTR-MCNC: 86 MG/DL (ref 70–99)
HCO3 BLDV-SCNC: 27 MMOL/L (ref 21–28)
HCT VFR BLD AUTO: 49.7 % (ref 40–53)
HGB BLD-MCNC: 16.9 G/DL (ref 13.3–17.7)
MCH RBC QN AUTO: 31.9 PG (ref 26.5–33)
MCHC RBC AUTO-ENTMCNC: 34 G/DL (ref 31.5–36.5)
MCV RBC AUTO: 94 FL (ref 78–100)
O2/TOTAL GAS SETTING VFR VENT: 55 %
PCO2 BLDV: 40 MM HG (ref 40–50)
PH BLDV: 7.44 [PH] (ref 7.32–7.43)
PLATELET # BLD AUTO: 241 10E3/UL (ref 150–450)
PO2 BLDV: 13 MM HG (ref 25–47)
POTASSIUM BLD-SCNC: 3.9 MMOL/L (ref 3.4–5.3)
PROT SERPL-MCNC: 7 G/DL (ref 6.8–8.8)
RBC # BLD AUTO: 5.3 10E6/UL (ref 4.4–5.9)
SODIUM SERPL-SCNC: 138 MMOL/L (ref 133–144)
WBC # BLD AUTO: 8 10E3/UL (ref 4–11)

## 2021-10-20 PROCEDURE — 85027 COMPLETE CBC AUTOMATED: CPT | Performed by: FAMILY MEDICINE

## 2021-10-20 PROCEDURE — 99291 CRITICAL CARE FIRST HOUR: CPT

## 2021-10-20 PROCEDURE — 200N000001 HC R&B ICU

## 2021-10-20 PROCEDURE — 250N000013 HC RX MED GY IP 250 OP 250 PS 637: Performed by: INTERNAL MEDICINE

## 2021-10-20 PROCEDURE — 250N000011 HC RX IP 250 OP 636

## 2021-10-20 PROCEDURE — 82803 BLOOD GASES ANY COMBINATION: CPT | Performed by: FAMILY MEDICINE

## 2021-10-20 PROCEDURE — 86140 C-REACTIVE PROTEIN: CPT | Performed by: FAMILY MEDICINE

## 2021-10-20 PROCEDURE — 36415 COLL VENOUS BLD VENIPUNCTURE: CPT | Performed by: FAMILY MEDICINE

## 2021-10-20 PROCEDURE — 82040 ASSAY OF SERUM ALBUMIN: CPT | Performed by: FAMILY MEDICINE

## 2021-10-20 PROCEDURE — 250N000013 HC RX MED GY IP 250 OP 250 PS 637

## 2021-10-20 PROCEDURE — 250N000013 HC RX MED GY IP 250 OP 250 PS 637: Performed by: FAMILY MEDICINE

## 2021-10-20 PROCEDURE — 999N000215 HC STATISTIC HFNC ADULT NON-CPAP

## 2021-10-20 RX ADMIN — Medication 12.5 MG: at 08:10

## 2021-10-20 RX ADMIN — ENOXAPARIN SODIUM 40 MG: 100 INJECTION SUBCUTANEOUS at 21:41

## 2021-10-20 RX ADMIN — DOXYCYCLINE HYCLATE 50 MG: 50 CAPSULE ORAL at 08:09

## 2021-10-20 RX ADMIN — ALBUTEROL SULFATE 2 PUFF: 90 AEROSOL, METERED RESPIRATORY (INHALATION) at 17:23

## 2021-10-20 RX ADMIN — ACETAMINOPHEN 650 MG: 325 TABLET, FILM COATED ORAL at 08:10

## 2021-10-20 RX ADMIN — ACETAMINOPHEN 650 MG: 325 TABLET, FILM COATED ORAL at 21:36

## 2021-10-20 RX ADMIN — PAROXETINE HYDROCHLORIDE 40 MG: 20 TABLET, FILM COATED ORAL at 21:37

## 2021-10-20 RX ADMIN — FUROSEMIDE 20 MG: 20 TABLET ORAL at 21:54

## 2021-10-20 RX ADMIN — SIMVASTATIN 20 MG: 20 TABLET, FILM COATED ORAL at 21:37

## 2021-10-20 RX ADMIN — BRIMONIDINE TARTRATE 1 DROP: 2 SOLUTION OPHTHALMIC at 08:32

## 2021-10-20 RX ADMIN — UMECLIDINIUM 1 PUFF: 62.5 AEROSOL, POWDER ORAL at 12:03

## 2021-10-20 RX ADMIN — LAMOTRIGINE 150 MG: 100 TABLET ORAL at 08:10

## 2021-10-20 RX ADMIN — ALBUTEROL SULFATE 2 PUFF: 90 AEROSOL, METERED RESPIRATORY (INHALATION) at 08:32

## 2021-10-20 RX ADMIN — ENOXAPARIN SODIUM 40 MG: 100 INJECTION SUBCUTANEOUS at 08:10

## 2021-10-20 RX ADMIN — SENNOSIDES AND DOCUSATE SODIUM 1 TABLET: 50; 8.6 TABLET ORAL at 08:10

## 2021-10-20 RX ADMIN — BRIMONIDINE TARTRATE 1 DROP: 2 SOLUTION OPHTHALMIC at 21:53

## 2021-10-20 RX ADMIN — SENNOSIDES AND DOCUSATE SODIUM 2 TABLET: 50; 8.6 TABLET ORAL at 21:37

## 2021-10-20 RX ADMIN — FUROSEMIDE 20 MG: 20 TABLET ORAL at 12:02

## 2021-10-20 RX ADMIN — LAMOTRIGINE 150 MG: 100 TABLET ORAL at 21:38

## 2021-10-20 RX ADMIN — LORAZEPAM 1 MG: 1 TABLET ORAL at 21:38

## 2021-10-20 RX ADMIN — ALBUTEROL SULFATE 2 PUFF: 90 AEROSOL, METERED RESPIRATORY (INHALATION) at 21:38

## 2021-10-20 RX ADMIN — ALBUTEROL SULFATE 2 PUFF: 90 AEROSOL, METERED RESPIRATORY (INHALATION) at 12:05

## 2021-10-20 ASSESSMENT — ACTIVITIES OF DAILY LIVING (ADL)
ADLS_ACUITY_SCORE: 10

## 2021-10-20 ASSESSMENT — MIFFLIN-ST. JEOR: SCORE: 1543.12

## 2021-10-20 NOTE — PLAN OF CARE
"Pt sat on commode and passed a large amount of flatus and a med soft stool. Pt has been resting better tonight, occasional restlessness but he has not tried to get out of bed . He has been able to turn himself to comfort and at times will throw off the blankets because he is \"too warm\" . No fidgeting or pulling on the currie catheter tonight. .he has not c/o pain or discomfort. Fingers and toes are cold/cool to touch but oxygen sats have been mostly 92-95% when there is  a good wave form. Frecuent safety checks.  "

## 2021-10-20 NOTE — PROGRESS NOTES
Patient remains on hiflow  Oxygen at 50L FI02 of 60%. Oxygen saturation drops to 80% w/activity.Pt  anxious ,able to sit on commode and had  BM. Patient requested to get back to bed, RR increased to 34 with activity. At rest though ,RR goes down to 26 and oxygen saturation up to 90% Patient will call when needed continue to watch for increased oxygen needs.

## 2021-10-20 NOTE — PROGRESS NOTES
[facility]           Assessment & Plan        Remington Gutierrez is a 75 year old male admitted on 10/8/2021. [facility] with history of COPD, systolic heart failure, S/P AICD, type 2 diabetes, and anxiety/depression. He presented with increasing shortness of breath for 4 days, especially with activity and lying down, nonproductive cough, and was found to be COVID positive.     # Acute Hypoxic Respiratory Failure secondary to COVID-19 infection  # Viral Pneumonia secondary to COVID-19 infection     Symptom Onset 10/4/2021   Date of 1st Positive Test 10/8/2021   Vaccination Status Partially Vaccinated - 1st dose < 1 week prior to onset of symptoms             Initial chest x-ray demonstrated bilateral interstitial and groundglass opacities consistent with pneumonia.  He initially required oxygen at 5 L/min per nasal cannula, though he came more hypoxemic 10/9/2021, requiring the use of HFNC oxygen.  He remains on HFNC oxygen, currently at 50 L/min and FiO2 0.60.  He has not needed BiPAP support in several days.  Rest dyspnea is mild.  Exertional dyspnea is moderate to severe.  Cough is mild, and is nonproductive.  He has no respiratory chest pain.  He has been afebrile.       - Oxygen: Continue HFNC 50 L/min with FiO2 0.60, and titrate to keep SpO2 between 90-96%.    - Labs:   .0 --> 100.0 --> 50.5 --> 34.3 --> 16.2 --> 4.4 --> 3.0 --> 2.9 --> 2.9 --> 2.9 --> 8.6 today.   --> 714 --> 637 --> 660  fibrinogen 633 --> 677 --> 668 --> 650 --> 556  d-Dimer 1.43 --> 1.17 --> 0.67 --> 0.64 --> 0.61 --> 0.85 --> 0.74  CRP has increased slightly from yesterday, a nonspecific finding.     - Imaging: No additional imaging necessary.  - Breathing treatments:  Continue albuterol HFA and home tiotropium inhalers as below; avoid nebulizers in favor of MDIs.  - IV fluids: None currently indicated.  - Antibiotics: Not indicated.  - COVID-Focused Medications:   Dexamethasone 6 mg   X 10 days started on 10/8/2021, completed 10/17/2021.  Remdesivir was eventually started 10/10/2021 once renal function improved, 5 day course completed 10/14/2021.  Tocilizumab 600 mg IV given 10/10/2021 after discussion with Dr. Tapia, infectious disease.    - DVT Prophylaxis:   On admission started heparin 5000 units every 8 hours due to TANIA, which was then changed to a low intensity heparin infusion when hypoxemia worsened.  Kidney injury subsequently improved, and the patient was clinically stable in terms of O2 needs, so heparin was discontinued and enoxaparin 0.5 mg/kg subcu every 12 hours started and continues.  Consider anticoag on discharge for 30 days & until return to normal mobility.    Volume overload pulmonary edema adding to hypoxemia  Oxygen needs began to increase 10/16/2021.  Chest x-ray 10/16/2021 demonstrated continued bilateral interstitial and groundglass opacities consistent with COVID-19 pneumonia, along with new areas of airspace opacity in the lateral right lower lobe.  CT chest 10/16/2021 demonstrated moderate diffuse opacities consistent with COVID-19 pneumonia; no new infiltrates were described.  Furosemide 20 mg IV twice daily was resumed 10/16/2021, and continued through 10/18/2021, at which point it had to be stopped due to decreasing GFR.  Oxygen needs appeared to decrease slightly in response to diuresis.    -Acute pulmonary edema is resolved.  Now the GFR has returned to baseline, will resume preadmission furosemide 20 mg twice daily.     Acute Kidney Injury  Chronic kidney disease stage 2  Baseline GFR appears to be 60-65.  Admission GFR 29, likely representing acute kidney injury due to volume depletion from acute illness.  He received a 500 mL IV fluid bolus in ED, and preadmission lisinopril, furosemide, and Mobic were held.  Furosemide was resumed 10/12/2021. There was a gradual improvement in GFR: 29 --> 33 --> 38 --> 49 --> 60 -->64 as of 10/15/2021.  GFR again declined  in response to IV furosemide diuresis started 10/16/2021.  Furosemide was held after the 10/18/2021 doses.  GFR has again improved to baseline: 49 --> 55 --> 63 today.  -We will resume furosemide 20 mg p.o. twice daily as the patient was taking prior to admission.  -Preadmission lisinopril remains on hold.  -Continue to hold Mobic until necessary for symptom control.     Acute encephalopathy due to COVID-19 and corticosteroids  Acute toxic encephalopathy due to zolpidem 10/12 - 10/13/2021  Possible mild cognitive impairment/dementia  Noted to have some cognitive impairment on chart review, but not quantified, and no specific diagnosis has been made.  He was more acutely confused in the ER, pulling out IV's x 2 and removing his oxygen.  However, at time of admission H+P he was improved and appeared at cognitive baseline.  There had been no additional confusion or agitation until the early morning hours of 10/13/2021, at which time the patient was noted to be confused, agitated, and taking out his HFNC cannula.  He had gotten zolpidem 6.25 mg p.o. at bedtime, not a new medication for him, though I thought maybe the combination of zolpidem and dexamethasone was enough to cause the morning episode.  I stopped zolpidem 10/13/2021, and that night gave him ramelteon 8 mg p.o. at at bedtime x1.  He says that he slept poorly.  However, even though he did not get zolpidem last night, he was still more confused 10/14/2021 morning than previous mornings, easily redirectable verbally, and only mildly agitated (shifting the bed clothes frequently, changing the oxygen tubing from side to side, etc.).  As result, I do not think we can blame zolpidem for his mild encephalopathy.  It is more likely COVID-19 and corticosteroid effect.  I started lorazepam 1 mg p.o. nightly starting at 10/14/2021; this has helped the patient's sleep significantly, and has not caused any apparent agitation or delirium.  -Zolpidem is  discontinued.  -Ramelteon was ineffective (though only 1 dose given) and is discontinued.  -Continue lorazepam 1 mg p.o. nightly.     Chronic Systolic Congestive Heart Failure   Dilated Cardiomyopathy  Noted to have history of systolic CHF and dilated cardiomyopathy. Takes furosemide 20 mg twice daily PRN at baseline.  [facility]. Non-ishemic cardiomyopathy. ECHO 4/2021 - EF 25-30%. Severe LV dilation. Global hypokinesis. RVSF mildly reduced. Cardiac MRI 2/2021- c/w Non-ishemic cardiomyopathy. Ascending aorta 4.1 cm. Admitted with acute CHF [facility] 9/7-12/2021. Dry weight 202# per [facility] discharge summary 9/2021     On admission BNP elevated 3,907 (similar to 7/2021 ED visit for CHF/COPD). CXR on admission consistent with COVID pneumonia but not CHF.  Echocardiogram performed 10/11/2021 demonstrated a moderately dilated left ventricle, with severely reduced left ventricular systolic function, ejection fraction 30 to 35%.  Global hypokinesis was seen.  The patient required 2 days of IV diuresis for acute pulmonary edema, details above.  He now appears to be well compensated, and is back on preadmission furosemide 20 mg p.o. twice daily.     -Lisinopril remains on hold.  Given improvement in renal function to baseline, it could be resumed if necessary.  -Continue preadmission furosemide 20 mg p.o. twice daily.  -Follow daily weights and I/Os.        Abdominal discomfort due to constipation and urinary retention  Patient reported some intermittent abdominal cramping 10/19/2021.  Abdominal exam was unremarkable at that time.  He was found to have some urinary retention, though abdominal discomfort persisted even after Rose catheter placement.  CT abdomen 10/19/2021 demonstrated a moderate amount of stool in the colon.  He was treated with a Dulcolax suppository and a fleets enema, though had no response.  An additional enema was given, with moderate BM in response.  Abdominal discomfort is now resolved.     -Resolved.    -Continue laxatives and stimulants as needed.      Essential hypertension   Managed prior to admission with lisinopril 2.5 mg daily, metoprolol XL 25 mg daily and furosemide 20 mg p.o. twice daily.  Metoprolol had to be held 10/12/2021 due to bradycardia while also on remdesivir, then was resumed 10/16/2021 when remdesivir was complete.  Furosemide has been resumed at preadmission dose.  Lisinopril remains on hold, though could be resumed as renal function is back to baseline.  Blood pressure control has been good.  -Continue current metoprolol and torsemide.     Bradycardia  The patient had heart rates intermittently as low as 38/min.  I am a little surprised to hear that he can achieve heart rates this low as he has a pacer/AICD device in place, which was functioning on admission EKG.  Nonetheless, it does not appear that he has had significant associated symptoms.  Bradycardia was probably a result of combined remdesivir and metoprolol therapy.  Because I wanted him to complete 5 days of remdesivir, I put a temporary hold on metoprolol.  Metoprolol was resumed 10/16/2021, though at a dose of 12.5 mg twice daily rather than 25 mg of the XL form daily.  Only mild, episodic bradycardia has been seen over the past couple of days.  -Observe heart rate while on metoprolol.  COPD    Noted on chart review. PFTs unavailable. Not on home oxygen.  Exam suggests mild to moderate expiratory phase prolongation, without wheeze.  -Continued preadmission albuterol scheduled 4 times daily and every 4 hours as needed.  -Continue preadmission tiotropium.     Elevated troponin - suspect strain (Type 2 MI) due to COVID infection  Initial troponin 0.107. ECG showed paced rhythm, limiting interpretation. Asymptomatic; patient reported no chest pain prior to admission or since admission.  Suspect this elevation represented strain related to COVID pneumonia and hypoxemic failure.  Next troponins were normal at 0.023  --> 0.019.  -No additional troponin monitoring planned.     Diabetes Mellitus, Type 2  Managed prior to admission with empagliflozin 12.5 mg daily, glipizide 5 mg daily, and metformin 500 mg twice daily.  These outpatient medications have been held since admission. Hemoglobin A1c 7.2 on 10/5/2021.  The patient has been hyperglycemic since dexamethasone was added.  He is currently receiving glargine 25 units subcu every morning, prandial NovoLog 6 units subcu 3 times daily AC, and a NovoLog medium insulin resistance sliding scale.  Glucose control has been generally good, 126 - 182 over the past 5 checks.  On this regimen, he has shown intermittent hypoglycemia, as low as 64 on 10/19/2021 evening.  -Decrease glargine to 15 units subcu every morning.  -Discontinue prandial 6 units subcu 3 times daily AC.  -Continue NovoLog medium resistance sliding scale.  -We will hold empagliflozin, glipizide, and Metformin for now.       BENNY  Mild, but severe supine by sleep study 2008. Reports using CPAP at home, not currently available for use here.  -Consider resumption of home CPAP when COVID-19 recovered and when off of high flow nasal cannula oxygen.     Anxiety and Depression  Insomnia  Previously diagnosed, and managed prior to admission with lamotrigine 150 mg twice daily and paroxetine 40 mg p.o. nightly, both continued here.  Mood and affect have been stable this hospital stay.    -Continue preadmission lamotrigine and paroxetine.  -Out of concern that zolpidem is causing morning agitated delirium, I stopped zolpidem on 10/13/2021, and gave him ramelteon 8 mg p.o. at at bedtime that night.  He slept poorly on ramelteon, and did not wish to continue using it.  He did markedly better on lorazepam 1 mg p.o. nightly, with encephalopathy resolved as above.       Sinus node dysfunction   S/P AICD 5/4/2021  Paced rhythm noted on admission ECG.  I cannot find details in the patient's past medical records as to the properties of  "his implantable device.  However, it does appear to be a pacemaker based upon the appearance of his admission EKG. he continues to have mild, episodic bradycardia, as low as 44/min within the last 24 hours.  I am not sure why bradycardia is occurring with the pacer in place.  -We will arrange for pacemaker interrogation once the patient is more stable from a respiratory standpoint.      Pulmonary nodule  A 9 mm RLL nodule was seen incidentally on CT chest.  -Advise follow-up CT approximately 1/16/2022 (3 months).    Rosacea  Continue home doxycycline daily.       DVT Prophylaxis: Enoxaparin 0.5 mg subcu every 12 hours (HFNC).  Rose Catheter: Placed 10/19/2021 due to urinary retention and abdominal discomfort.  Central Lines: None  Code Status: Full Code       Diet  Orders Placed This Encounter      Combination Diet Consistent Carb 75 Grams CHO per Meal Diet; Easy to Chew (level 7); Thin Liquids (level 0)        Disposition  Continues to require HFNC and therefore ICU care.  Anticipate an additional 5 days in the hospital at a minimum.            Interval History:   Was sleeping quietly when I entered his room, though awoke to be alert and conversant.  He has essentially no rest dyspnea.  He has not been up and around much to test exertional dyspnea.  Cough is mild, and is nonproductive.  He has no respiratory chest pain.  He describes his appetite is \"good\".  Reports the previous abdominal discomfort has resolved entirely.  He says that he is having regular BMs at this point.  Denies nausea or vomiting.  Denies musculoskeletal pain.           Review of Systems:    ROS: 10 point ROS neg other than the symptoms noted above in the HPI.           Medications:   Current active medications and PTA medications reviewed, see medication list for details.            Physical Exam:   Vitals were reviewed  Patient Vitals for the past 24 hrs:   BP Temp Temp src Pulse Resp SpO2 Weight   10/20/21 0630 (!) 104/96 96.8  F (36  C) " Axillary -- -- -- 84 kg (185 lb 3 oz)   10/20/21 0353 -- -- -- 70 -- -- --   10/20/21 0330 -- -- -- 68 -- 95 % --   10/20/21 0230 -- -- -- 71 20 95 % --   10/20/21 0200 106/61 99.3  F (37.4  C) Temporal 72 20 95 % --   10/20/21 0100 -- -- -- 77 -- -- --   10/20/21 0030 -- -- -- -- 20 91 % --   10/20/21 0000 113/58 -- -- 60 20 96 % --   10/19/21 2300 -- -- -- 63 20 92 % --   10/19/21 2247 -- -- -- 74 20 -- --   10/19/21 224 -- -- -- -- -- 94 % --   10/19/21 224 -- -- -- -- -- 96 % --   10/19/21 2230 -- -- -- -- 20 94 % --   10/19/21 2220 -- -- -- -- -- 92 % --   10/19/21 2200 108/89 -- -- 72 20 93 % --   10/19/21 2100 -- 97.4  F (36.3  C) Temporal 85 -- -- --   10/19/21 2000 95/78 -- -- 76 24 -- --   10/19/21 1953 100/72 96.9  F (36.1  C) Axillary 79 20 96 % --   10/19/21 1900 -- -- -- -- -- 96 % --   10/19/21 1806 106/66 -- -- 64 16 92 % --   10/19/21 1600 104/72 97.9  F (36.6  C) Oral 63 17 93 % --   10/19/21 1500 -- -- -- 68 16 94 % --   10/19/21 1400 123/85 -- -- 66 18 -- --   10/19/21 1215 117/64 98  F (36.7  C) Oral 77 29 93 % --   10/19/21 1200 (!) 86/65 -- -- 61 21 98 % --   10/19/21 1054 -- 98.4  F (36.9  C) Oral -- -- -- --   10/19/21 0800 101/70 -- -- (!) 44 28 94 % --       Temperatures:  Current - Temp: 97.9  F (36.6  C); Max - Temp  Av.9  F (36.6  C)  Min: 97.4  F (36.3  C)  Max: 98.4  F (36.9  C)  Respiration range: Resp  Av  Min: 15  Max: 28  Pulse range: Pulse  Av.3  Min: 44  Max: 93  Blood pressure range: Systolic (24hrs), Av , Min:86 , Max:132   ; Diastolic (24hrs), Av, Min:65, Max:108    Pulse oximetry range: SpO2  Av.7 %  Min: 75 %  Max: 98 %  I/O last 3 completed shifts:  In: 610 [P.O.:610]  Out: 1050 [Urine:1050]      Intake/Output Summary (Last 24 hours) at 10/20/2021 0811  Last data filed at 10/20/2021 0200  Gross per 24 hour   Intake 370 ml   Output 1050 ml   Net -680 ml     GENERAL: Pleasant man, lying in bed, appears comfortable.  EYES: Eyes grossly normal to  inspection, extraocular movements intact  HENT: HFNC in place.  NECK: Trachea midline, no stridor.    RESP: No accessory muscle use.  Symmetrical breath sounds.  Lungs clear throughout on inspiration.  Expiration sounds mildly to moderately prolonged, no wheeze.  CV: Regular rate and rhythm, non-tachycardic.  Normal S1 S2, no murmur or extra sound.  No lower extremity edema.  ABDOMEN: Soft, non-tender, no guarding.  Liver and spleen not enlarged, no masses palpable.  Bowel sounds positive.  MS: No bony deformities noted.  No red or inflamed joints.  SKIN: Warm and dry, no rashes.  NEURO: Alert, oriented, conversant.  Cranial nerves III - XII grossly intact aside from being hard of hearing.  No gross motor or sensory deficits.    : Rose catheter in place.  PSYCH: Calm, alert, conversant.  Able to articulate logical thoughts, no tangential thoughts, no hallucinations or delusions.  Affect normal.                Data:     Results for orders placed or performed during the hospital encounter of 10/08/21 (from the past 24 hour(s))   Glucose by meter   Result Value Ref Range    GLUCOSE BY METER POCT 55 (L) 70 - 99 mg/dL   Glucose by meter   Result Value Ref Range    GLUCOSE BY METER POCT 61 (L) 70 - 99 mg/dL   Glucose by meter   Result Value Ref Range    GLUCOSE BY METER POCT 52 (L) 70 - 99 mg/dL   Glucose by meter   Result Value Ref Range    GLUCOSE BY METER POCT 76 70 - 99 mg/dL   Glucose by meter   Result Value Ref Range    GLUCOSE BY METER POCT 127 (H) 70 - 99 mg/dL   Glucose by meter   Result Value Ref Range    GLUCOSE BY METER POCT 210 (H) 70 - 99 mg/dL   Glucose by meter   Result Value Ref Range    GLUCOSE BY METER POCT 103 (H) 70 - 99 mg/dL   CT Chest/Abdomen/Pelvis w Contrast    Narrative    EXAM: CT CHEST/ABDOMEN/PELVIS W CONTRAST  LOCATION: [facility]  DATE/TIME: 10/19/2021 7:29 PM    INDICATION: COVID patient with some abdominal pain which he thinks is constipation, also with  increased o2 needs - ? constipation vs other intra-abdominal process, assess for other acute process in chest superimposed on COVID  COMPARISON: Chest CT from 10/16/2021.  TECHNIQUE: CT scan of the chest, abdomen, and pelvis was performed following injection of IV contrast. Multiplanar reformats were obtained. Dose reduction techniques were used.   CONTRAST: 84 mL Isovue 370    FINDINGS:   LUNGS AND PLEURA: Diffuse hazy groundglass infiltrates throughout both lungs similar to the prior study and compatible with known history of COVID-19 pneumonitis. Prominent dense area of consolidation in the right middle lobe laterally has increased from   the prior study. There remains an indeterminate 9 mm nodule in the right lower lobe. Small benign calcified granuloma left upper lobe. No pleural effusions.    MEDIASTINUM/AXILLAE: No significant adenopathy. Mild cardiac enlargement. No pericardial effusion. Normal caliber thoracic aorta. Esophagus is grossly negative. Left-sided cardiac pacer.    CORONARY ARTERY CALCIFICATION: Minimal    HEPATOBILIARY: Technically indeterminate 7 mm rounded nodular area of enhancement in the left hepatic lobe on series 8 image 130. This most likely represents a small benign vascular lesion or perfusion anomaly. There are a couple subcentimeter   low-attenuation lesions present which most likely represent small benign cysts and/or benign vascular lesions and are of doubtful significance. No calcified gallstones or biliary dilatation.    PANCREAS: Normal.    SPLEEN: Normal.    ADRENAL GLANDS: Normal.    KIDNEYS/BLADDER: Tiny left renal cyst needs no follow-up. Kidneys are otherwise negative. No hydronephrosis. Rose catheter in the bladder which is decompressed.    BOWEL: Bowel is normal in caliber with no evidence for obstruction. Negative for appendicitis. Moderate amount of stool in the rectosigmoid colon. No acute bowel findings.    LYMPH NODES: Normal.    VASCULATURE: Diffuse aortoiliac  atheromatous disease. Negative for aneurysm.    PELVIC ORGANS: Normal.    MUSCULOSKELETAL: Old left rib fracture. Anterior fusion hardware partially visualized in the lower cervical spine. Prominent degenerative changes throughout the spine.      Impression    IMPRESSION:  1.  Hazy diffuse groundglass infiltrates throughout both lungs compatible with known history of COVID-19 pneumonitis and not significantly changed from the prior study.    2.  Prominent dense area of consolidation in the right middle lobe is seen laterally and has increased in size since the prior study suggesting a worsening focal area of pneumonitis.    3.  Indeterminate 9 mm nodule in the right lower lobe is unchanged. As was recommended on the prior study, 3 month follow-up CT is recommended for reevaluation following appropriate therapy for the patient's pneumonitis.    4.  Evidence of prior granulomatous disease.    5.  Cardiac enlargement.    6.  No acute findings in the abdomen or pelvis.    7.  7 mm nodular area of enhancement in the left hepatic lobe is nonspecific but has benign imaging characteristics most likely reflecting a benign vascular lesion. No specific follow-up needed if there are no risk factors present such as history of   malignancy. If there is clinical concern, an MRI could be obtained in 3-6 months for reevaluation.    8.  Moderate amount of stool in the rectosigmoid colon. No evidence for bowel obstruction or acute bowel findings.   Glucose by meter   Result Value Ref Range    GLUCOSE BY METER POCT 64 (L) 70 - 99 mg/dL   Glucose by meter   Result Value Ref Range    GLUCOSE BY METER POCT 97 70 - 99 mg/dL   Glucose by meter   Result Value Ref Range    GLUCOSE BY METER POCT 86 70 - 99 mg/dL   Blood gas venous   Result Value Ref Range    pH Venous 7.44 (H) 7.32 - 7.43    pCO2 Venous 40 40 - 50 mm Hg    pO2 Venous 13 (L) 25 - 47 mm Hg    Bicarbonate Venous 27 21 - 28 mmol/L    Base Excess/Deficit (+/-) 2.6 (H) -7.7 - 1.9  mmol/L    FIO2 55    CRP inflammation   Result Value Ref Range    CRP Inflammation 8.6 (H) 0.0 - 8.0 mg/L   Comprehensive metabolic panel   Result Value Ref Range    Sodium 138 133 - 144 mmol/L    Potassium 3.9 3.4 - 5.3 mmol/L    Chloride 106 94 - 109 mmol/L    Carbon Dioxide (CO2) 23 20 - 32 mmol/L    Anion Gap 9 3 - 14 mmol/L    Urea Nitrogen 41 (H) 7 - 30 mg/dL    Creatinine 1.12 0.66 - 1.25 mg/dL    Calcium 9.9 8.5 - 10.1 mg/dL    Glucose 67 (L) 70 - 99 mg/dL    Alkaline Phosphatase 125 40 - 150 U/L    AST 39 0 - 45 U/L    ALT 42 0 - 70 U/L    Protein Total 7.0 6.8 - 8.8 g/dL    Albumin 3.0 (L) 3.4 - 5.0 g/dL    Bilirubin Total 1.1 0.2 - 1.3 mg/dL    GFR Estimate 63 >60 mL/min/1.73m2   CBC with platelets   Result Value Ref Range    WBC Count 8.0 4.0 - 11.0 10e3/uL    RBC Count 5.30 4.40 - 5.90 10e6/uL    Hemoglobin 16.9 13.3 - 17.7 g/dL    Hematocrit 49.7 40.0 - 53.0 %    MCV 94 78 - 100 fL    MCH 31.9 26.5 - 33.0 pg    MCHC 34.0 31.5 - 36.5 g/dL    RDW 12.5 10.0 - 15.0 %    Platelet Count 241 150 - 450 10e3/uL           Attestation:  I have reviewed today's vital signs, notes, medications, labs and imaging.  Amount of time spent in direct patient care providing critical care: 40 minutes.     Sander Ware MD

## 2021-10-20 NOTE — PROGRESS NOTES
Patient is trying to have BM, reports it painful.On rectal exam stool felt. Warm blanket, dulcolax suppository and fleet enema given with the result of small hard BM, but patient still report an urge to have BM or urinate( not able to differentiate), requesting pink lady enema from MD.

## 2021-10-21 LAB
GLUCOSE BLDC GLUCOMTR-MCNC: 128 MG/DL (ref 70–99)
GLUCOSE BLDC GLUCOMTR-MCNC: 147 MG/DL (ref 70–99)
GLUCOSE BLDC GLUCOMTR-MCNC: 156 MG/DL (ref 70–99)
GLUCOSE BLDC GLUCOMTR-MCNC: 193 MG/DL (ref 70–99)
GLUCOSE BLDC GLUCOMTR-MCNC: 210 MG/DL (ref 70–99)

## 2021-10-21 PROCEDURE — 250N000011 HC RX IP 250 OP 636

## 2021-10-21 PROCEDURE — 250N000012 HC RX MED GY IP 250 OP 636 PS 637

## 2021-10-21 PROCEDURE — 250N000013 HC RX MED GY IP 250 OP 250 PS 637: Performed by: INTERNAL MEDICINE

## 2021-10-21 PROCEDURE — 120N000001 HC R&B MED SURG/OB

## 2021-10-21 PROCEDURE — 99291 CRITICAL CARE FIRST HOUR: CPT

## 2021-10-21 PROCEDURE — 258N000003 HC RX IP 258 OP 636

## 2021-10-21 PROCEDURE — 250N000013 HC RX MED GY IP 250 OP 250 PS 637

## 2021-10-21 PROCEDURE — 250N000013 HC RX MED GY IP 250 OP 250 PS 637: Performed by: FAMILY MEDICINE

## 2021-10-21 RX ORDER — NALOXONE HYDROCHLORIDE 0.4 MG/ML
0.4 INJECTION, SOLUTION INTRAMUSCULAR; INTRAVENOUS; SUBCUTANEOUS
Status: DISCONTINUED | OUTPATIENT
Start: 2021-10-21 | End: 2021-10-25 | Stop reason: HOSPADM

## 2021-10-21 RX ORDER — NALOXONE HYDROCHLORIDE 0.4 MG/ML
0.2 INJECTION, SOLUTION INTRAMUSCULAR; INTRAVENOUS; SUBCUTANEOUS
Status: DISCONTINUED | OUTPATIENT
Start: 2021-10-21 | End: 2021-10-25 | Stop reason: HOSPADM

## 2021-10-21 RX ORDER — OXYCODONE HYDROCHLORIDE 5 MG/1
5 TABLET ORAL EVERY 4 HOURS PRN
Status: DISCONTINUED | OUTPATIENT
Start: 2021-10-21 | End: 2021-10-25 | Stop reason: HOSPADM

## 2021-10-21 RX ORDER — IBUPROFEN 400 MG/1
400 TABLET, FILM COATED ORAL EVERY 6 HOURS PRN
Status: DISCONTINUED | OUTPATIENT
Start: 2021-10-21 | End: 2021-10-25 | Stop reason: HOSPADM

## 2021-10-21 RX ADMIN — IBUPROFEN 400 MG: 400 TABLET, FILM COATED ORAL at 09:26

## 2021-10-21 RX ADMIN — SIMVASTATIN 20 MG: 20 TABLET, FILM COATED ORAL at 22:59

## 2021-10-21 RX ADMIN — Medication 12.5 MG: at 08:28

## 2021-10-21 RX ADMIN — BRIMONIDINE TARTRATE 1 DROP: 2 SOLUTION OPHTHALMIC at 08:29

## 2021-10-21 RX ADMIN — LAMOTRIGINE 150 MG: 100 TABLET ORAL at 20:19

## 2021-10-21 RX ADMIN — INSULIN ASPART 2 UNITS: 100 INJECTION, SOLUTION INTRAVENOUS; SUBCUTANEOUS at 08:28

## 2021-10-21 RX ADMIN — INSULIN ASPART 1 UNITS: 100 INJECTION, SOLUTION INTRAVENOUS; SUBCUTANEOUS at 12:34

## 2021-10-21 RX ADMIN — INSULIN GLARGINE 15 UNITS: 100 INJECTION, SOLUTION SUBCUTANEOUS at 08:29

## 2021-10-21 RX ADMIN — OXYCODONE 5 MG: 5 TABLET ORAL at 09:27

## 2021-10-21 RX ADMIN — ACETAMINOPHEN 650 MG: 325 TABLET, FILM COATED ORAL at 09:07

## 2021-10-21 RX ADMIN — LORAZEPAM 1 MG: 1 TABLET ORAL at 22:59

## 2021-10-21 RX ADMIN — ALBUTEROL SULFATE 2 PUFF: 90 AEROSOL, METERED RESPIRATORY (INHALATION) at 16:53

## 2021-10-21 RX ADMIN — INSULIN ASPART 1 UNITS: 100 INJECTION, SOLUTION INTRAVENOUS; SUBCUTANEOUS at 16:53

## 2021-10-21 RX ADMIN — ALBUTEROL SULFATE 2 PUFF: 90 AEROSOL, METERED RESPIRATORY (INHALATION) at 08:27

## 2021-10-21 RX ADMIN — PAROXETINE HYDROCHLORIDE 40 MG: 20 TABLET, FILM COATED ORAL at 22:59

## 2021-10-21 RX ADMIN — LAMOTRIGINE 150 MG: 100 TABLET ORAL at 08:28

## 2021-10-21 RX ADMIN — ALBUTEROL SULFATE 2 PUFF: 90 AEROSOL, METERED RESPIRATORY (INHALATION) at 23:02

## 2021-10-21 RX ADMIN — SODIUM CHLORIDE 500 ML: 9 INJECTION, SOLUTION INTRAVENOUS at 15:22

## 2021-10-21 RX ADMIN — ENOXAPARIN SODIUM 40 MG: 100 INJECTION SUBCUTANEOUS at 08:27

## 2021-10-21 RX ADMIN — UMECLIDINIUM 1 PUFF: 62.5 AEROSOL, POWDER ORAL at 08:27

## 2021-10-21 RX ADMIN — ACETAMINOPHEN 650 MG: 325 TABLET, FILM COATED ORAL at 04:33

## 2021-10-21 RX ADMIN — BRIMONIDINE TARTRATE 1 DROP: 2 SOLUTION OPHTHALMIC at 20:22

## 2021-10-21 RX ADMIN — DOXYCYCLINE HYCLATE 50 MG: 50 CAPSULE ORAL at 08:29

## 2021-10-21 RX ADMIN — FUROSEMIDE 20 MG: 20 TABLET ORAL at 08:28

## 2021-10-21 RX ADMIN — ALBUTEROL SULFATE 2 PUFF: 90 AEROSOL, METERED RESPIRATORY (INHALATION) at 12:34

## 2021-10-21 RX ADMIN — SENNOSIDES AND DOCUSATE SODIUM 1 TABLET: 50; 8.6 TABLET ORAL at 08:28

## 2021-10-21 ASSESSMENT — ACTIVITIES OF DAILY LIVING (ADL)
ADLS_ACUITY_SCORE: 10

## 2021-10-21 ASSESSMENT — MIFFLIN-ST. JEOR: SCORE: 1505.12

## 2021-10-21 NOTE — PROGRESS NOTES
Current HFNC parameters 40 L and 50%. NC changed to  larger size at this time. Continue to monitor.

## 2021-10-21 NOTE — PLAN OF CARE
Pt has been alert, oriented ,calm and cooperative throughout the evening. He has been sleeping without agitation or restlessness. FIO2 decreased to 50%/45L and no change in resp status noted. Continue to monitor.

## 2021-10-21 NOTE — PROGRESS NOTES
Pt up to chair with assist of one, PT, OT ordered. Slightly unsteady and weak.   Able to titrate oxygen down to 7 L oximizer cannula, will continue to titrate as able. Updated Son Tab after permission obtained from pt, Tab will be family spokesperson.

## 2021-10-21 NOTE — PROGRESS NOTES
[facility] Hospitalist Progress Note           Assessment & Plan        Remington Gutierrez is a 75 year old male admitted on 10/8/2021. [facility] with history of COPD, systolic heart failure, S/P AICD, type 2 diabetes, and anxiety/depression. He presented with increasing shortness of breath for 4 days, especially with activity and lying down, nonproductive cough, and was found to be COVID positive.     # Acute Hypoxic Respiratory Failure secondary to COVID-19 infection  # Viral Pneumonia secondary to COVID-19 infection     Symptom Onset 10/4/2021   Date of 1st Positive Test 10/8/2021   Vaccination Status Partially Vaccinated - 1st dose < 1 week prior to onset of symptoms             Initial chest x-ray demonstrated bilateral interstitial and groundglass opacities consistent with pneumonia.  He initially required oxygen at 5 L/min per nasal cannula, though he came more hypoxemic 10/9/2021, requiring the use of HFNC oxygen.  He remains on HFNC oxygen, though at decreasing flow and FiO2, currently at 00 L/min and FiO2 0.45.  He has not needed BiPAP support in several days.  Rest dyspnea is mild.  Exertional dyspnea is improved.  Cough is mild, and is nonproductive.  He has no respiratory chest pain.  He has been afebrile.       - Oxygen: Continue HFNC 40 L/min with FiO2 0.45, and titrate to keep SpO2 between 90-96%.  With some additional improvement in oxygenation, we may be able to transition away from the HFNC to standard flow.  - Labs:   .0 --> 100.0 --> 50.5 --> 34.3 --> 16.2 --> 4.4 --> 3.0 --> 2.9 --> 2.9 --> 2.9 --> 8.6.   --> 714 --> 637 --> 660  fibrinogen 633 --> 677 --> 668 --> 650 --> 556  d-Dimer 1.43 --> 1.17 --> 0.67 --> 0.64 --> 0.61 --> 0.85 --> 0.74  Given clinical improvement in overall trend toward recovery and inflammatory markers, will discontinue daily monitoring.  - Imaging: No additional imaging necessary.  - Breathing treatments:  Continue albuterol HFA and home  tiotropium inhalers as below; avoid nebulizers in favor of MDIs.  - IV fluids: None currently indicated.  - Antibiotics: Not indicated.  - COVID-Focused Medications:   Dexamethasone 6 mg daily given 10/8 - 10/17/2021.  Remdesivir was eventually started 10/10/2021 once renal function improved, 5 day course completed 10/14/2021.  Tocilizumab 600 mg IV given 10/10/2021 after discussion with Dr. Tapia, infectious disease.    - DVT Prophylaxis:   On admission started heparin 5000 units every 8 hours due to TANIA, which was then changed to a low intensity heparin infusion when hypoxemia worsened.  Kidney injury subsequently improved, and the patient was clinically stable in terms of O2 needs, so heparin was discontinued and enoxaparin 0.5 mg/kg subcu every 12 hours started and continues.  Consider anticoag on discharge for 30 days & until return to normal mobility.    Volume overload pulmonary edema adding to hypoxemia  Oxygen needs began to increase 10/16/2021.  Chest x-ray 10/16/2021 demonstrated continued bilateral interstitial and groundglass opacities consistent with COVID-19 pneumonia, along with new areas of airspace opacity in the lateral right lower lobe.  CT chest 10/16/2021 demonstrated moderate diffuse opacities consistent with COVID-19 pneumonia; no new infiltrates were described.  Furosemide 20 mg IV twice daily was resumed 10/16/2021, and continued through 10/18/2021, at which point it had to be stopped due to decreasing GFR.  Oxygen needs appeared to decrease slightly in response to diuresis.    -Acute pulmonary edema is resolved.  Now the GFR has returned to baseline, have resumed preadmission furosemide 20 mg twice daily.     Acute Kidney Injury  Chronic kidney disease stage 2  Baseline GFR appears to be 60-65.  Admission GFR 29, likely representing acute kidney injury due to volume depletion from acute illness.  He received a 500 mL IV fluid bolus in ED, and preadmission lisinopril, furosemide, and  Mobic were held.  Furosemide was resumed 10/12/2021. There was a gradual improvement in GFR: 29 --> 33 --> 38 --> 49 --> 60 -->64 as of 10/15/2021.  GFR again declined in response to IV furosemide diuresis started 10/16/2021.  Furosemide was held after the 10/18/2021 doses.  GFR again improved to baseline: 49 --> 55 --> 63 today.  Preadmission furosemide 20 mg twice daily has been resumed.  -Continue to follow renal function, next labs tomorrow.  -Preadmission lisinopril remains on hold.  -Continue to hold Mobic until necessary for symptom control.     Acute encephalopathy due to COVID-19 and corticosteroids  Acute toxic encephalopathy due to zolpidem 10/12 - 10/13/2021  Possible mild cognitive impairment/dementia  Noted to have some cognitive impairment on chart review, but not quantified, and no specific diagnosis has been made.  He was more acutely confused in the ER, pulling out IV's x 2 and removing his oxygen.  However, at time of admission H+P he was improved and appeared at cognitive baseline.  There had been no additional confusion or agitation until the early morning hours of 10/13/2021, at which time the patient was noted to be confused, agitated, and taking out his HFNC cannula.  He had gotten zolpidem 6.25 mg p.o. at bedtime, not a new medication for him, though I thought maybe the combination of zolpidem and dexamethasone was enough to cause the morning episode.  I stopped zolpidem 10/13/2021, and that night gave him ramelteon 8 mg p.o. at at bedtime x1.  He says that he slept poorly.  However, even though he did not get zolpidem last night, he was still more confused 10/14/2021 morning than previous mornings, easily redirectable verbally, and only mildly agitated (shifting the bed clothes frequently, changing the oxygen tubing from side to side, etc.).  As result, I do not think we can blame zolpidem for his mild encephalopathy.  Encephalopathy was more likely due to COVID-19 and corticosteroid  effect.  With improvement in COVID-19 illness and continuation of dexamethasone, the patient's mental status has remained intact.  I started lorazepam 1 mg p.o. nightly starting at 10/14/2021; this has helped the patient's sleep significantly, and has not caused any apparent agitation or delirium.  -Zolpidem is discontinued.  -Ramelteon was ineffective (though only 1 dose given) and is discontinued.  -Continue lorazepam 1 mg p.o. nightly.     Chronic Systolic Congestive Heart Failure   Dilated Cardiomyopathy  Noted to have history of systolic CHF and dilated cardiomyopathy. Takes furosemide 20 mg twice daily PRN at baseline.  [facility]. Non-ishemic cardiomyopathy. ECHO 4/2021 - EF 25-30%. Severe LV dilation. Global hypokinesis. RVSF mildly reduced. Cardiac MRI 2/2021- c/w Non-ishemic cardiomyopathy. Ascending aorta 4.1 cm. Admitted with acute CHF [facility] 9/7-12/2021. Dry weight 202# per [facility] discharge summary 9/2021     On admission BNP elevated 3,907 (similar to 7/2021 ED visit for CHF/COPD). CXR on admission consistent with COVID pneumonia but not CHF.  Echocardiogram performed 10/11/2021 demonstrated a moderately dilated left ventricle, with severely reduced left ventricular systolic function, ejection fraction 30 to 35%.  Global hypokinesis was seen.  The patient required 2 days of IV diuresis for acute pulmonary edema, details above.  He now appears to be well compensated, and is back on preadmission furosemide 20 mg p.o. twice daily.     -Lisinopril remains on hold.  Given improvement in renal function to baseline, it could be resumed if necessary.  -Continue preadmission furosemide 20 mg p.o. twice daily.  -Follow daily weights and I/Os.        Abdominal discomfort due to constipation and urinary retention  Patient reported some intermittent abdominal cramping 10/19/2021.  Abdominal exam was unremarkable at that time.  He was found to have some urinary retention, though abdominal discomfort persisted  even after Rose catheter placement.  CT abdomen 10/19/2021 demonstrated a moderate amount of stool in the colon.  He was treated with a Dulcolax suppository and a fleets enema, though had no response.  An additional enema was given, with moderate BM in response.  Abdominal discomfort is now resolved.    -Resolved.    -Continue laxatives and stimulants as needed.      Essential hypertension   Managed prior to admission with lisinopril 2.5 mg daily, metoprolol XL 25 mg daily and furosemide 20 mg p.o. twice daily.  Metoprolol had to be held 10/12/2021 due to bradycardia while also on remdesivir, then was resumed 10/16/2021 when remdesivir was complete.  Furosemide has been resumed at preadmission dose.  Lisinopril remains on hold, though could be resumed as renal function is back to baseline.  Blood pressure has been running low this morning, whereas it had been well controlled up until today.  -We will hold metoprolol until blood pressure recovers.  -He appears to need the furosemide to avoid volume overload, so we will continue 20 mg p.o. twice daily for now.     Bradycardia  The patient had heart rates intermittently as low as 38/min.  I am a little surprised to hear that he can achieve heart rates this low as he has a pacer/AICD device in place, which was functioning on admission EKG.  Nonetheless, it does not appear that he has had significant associated symptoms.  Bradycardia was probably a result of combined remdesivir and metoprolol therapy.  Because I wanted him to complete 5 days of remdesivir, I put a temporary hold on metoprolol.  Metoprolol was resumed 10/16/2021, though at a dose of 12.5 mg twice daily rather than 25 mg of the XL form daily.  Only mild, episodic bradycardia has been seen over the past couple of days.  Note that metoprolol is put on hold 10/21/2021 due to mild hypotension, described above.  -Observe heart rate while metoprolol is on hold.    COPD    Noted on chart review. PFTs  unavailable. Not on home oxygen.  Exam suggests mild to moderate expiratory phase prolongation, without wheeze.  -Continued preadmission albuterol scheduled 4 times daily and every 4 hours as needed.  -Continue preadmission tiotropium.     Elevated troponin - suspect strain (Type 2 MI) due to COVID infection  Initial troponin 0.107. ECG showed paced rhythm, limiting interpretation. Asymptomatic; patient reported no chest pain prior to admission or since admission.  Suspect this elevation represented strain related to COVID pneumonia and hypoxemic failure.  Next troponins were normal at 0.023 --> 0.019.  -No additional troponin monitoring planned.     Diabetes Mellitus, Type 2  Managed prior to admission with empagliflozin 12.5 mg daily, glipizide 5 mg daily, and metformin 500 mg twice daily.  These outpatient medications have been held since admission. Hemoglobin A1c 7.2 on 10/5/2021.  The patient became hyperglycemic after dexamethasone was added, so insulin glargine, prandial NovoLog, and NovoLog medium resistance sliding scale were added and titrated up.  Dexamethasone is now complete, and hyperglycemia is improving.  He is currently receiving glargine 15 units subcu every morning, prandial NovoLog has been stopped, and a NovoLog medium insulin resistance sliding scale.  Glucose control has been generally good, 80 - 210 over the past 5 checks.    -Continue glargine 15 units subcu every morning.  -Continue NovoLog medium resistance sliding scale.  -We will hold empagliflozin, glipizide, and Metformin for now, though will start reintroducing these when closer to discharge.  It is unlikely that the patient will need to be discharged on insulin.     BENNY  Mild, but severe supine by sleep study 2008. Reports using CPAP at home, not currently available for use here.  -Consider resumption of home CPAP when COVID-19 recovered and when off of high flow nasal cannula oxygen.     Anxiety and Depression  Insomnia  Previously  "diagnosed, and managed prior to admission with lamotrigine 150 mg twice daily and paroxetine 40 mg p.o. nightly, both continued here.  Mood and affect have been stable this hospital stay.    -Continue preadmission lamotrigine and paroxetine.  -Out of concern that zolpidem is causing morning agitated delirium, I stopped zolpidem on 10/13/2021, and gave him ramelteon 8 mg p.o. at at bedtime that night.  He slept poorly on ramelteon, and did not wish to continue using it.  He did markedly better on lorazepam 1 mg p.o. nightly, with encephalopathy resolved as above.       Sinus node dysfunction   S/P AICD 5/4/2021  Paced rhythm noted on admission ECG.  I cannot find details in the patient's past medical records as to the properties of his implantable device.  However, it does appear to be a pacemaker based upon the appearance of his admission EKG. he continues to have mild, episodic bradycardia, as low as 44/min within the last 24 hours.  I am not sure why bradycardia is occurring with the pacer in place.  -Consider outpatient pacemaker interrogation.     Pulmonary nodule  A 9 mm RLL nodule was seen incidentally on CT chest.  -Advise follow-up CT approximately 1/16/2022 (3 months).    Rosacea  Continue home doxycycline daily.       DVT Prophylaxis: Enoxaparin 0.5 mg subcu every 12 hours (HFNC).  Rose Catheter: Placed 10/19/2021 due to urinary retention and abdominal discomfort.  Central Lines: None  Code Status: Full Code       Diet  Orders Placed This Encounter      Combination Diet Consistent Carb 75 Grams CHO per Meal Diet; Easy to Chew (level 7); Thin Liquids (level 0)        Disposition  Continues to require HFNC and therefore ICU care, although his current HF NC flow and FiO2 could be delivered in a medical/surgical unit.  We will consider stepping him down from the ICU later today or tomorrow based on bed availability.  Anticipate an additional 4 to 5 days in the hospital.            Interval History:   \"Good\".  " No longer has any significant rest dyspnea.  Exertional dyspnea is no more than mild.  Cough is less frequent, and is nonproductive.  He reported some right anterior pleuritic chest pain this morning, for which I ordered ibuprofen and oxycodone.  He says that his chest discomfort is currently absent.  His appetite is very good.  Denies dysphagia, nausea, or vomiting.  He no longer feels constipated after response to laxatives and enemas a couple of days ago.           Review of Systems:    ROS: 10 point ROS neg other than the symptoms noted above in the HPI.           Medications:   Current active medications and PTA medications reviewed, see medication list for details.            Physical Exam:   Vitals were reviewed  Patient Vitals for the past 24 hrs:   BP Temp Temp src Pulse Resp SpO2 Weight   10/21/21 1020 (!) 87/58 -- -- 62 21 94 % --   10/21/21 1000 (!) 75/55 -- -- 62 24 90 % --   10/21/21 0830 -- -- -- 72 22 92 % --   10/21/21 0800 100/59 -- -- 77 22 -- --   10/21/21 0600 112/61 98.1  F (36.7  C) Temporal 78 20 90 % 80.2 kg (176 lb 12.9 oz)   10/21/21 0400 112/82 -- -- 72 -- 93 % --   10/21/21 0230 -- -- -- -- -- 95 % --   10/21/21 0200 97/59 97.5  F (36.4  C) Temporal 65 20 93 % --   10/21/21 0100 -- -- -- 64 20 94 % --   10/21/21 0000 -- -- -- 71 18 95 % --   10/20/21 2330 -- -- -- 66 20 -- --   10/20/21 2300 -- -- -- 59 20 95 % --   10/20/21 2100 -- -- Oral -- -- -- --   10/20/21 2000 108/68 -- -- 61 24 99 % --   10/20/21 1930 -- -- -- 60 24 99 % --   10/20/21 1857 -- 97.1  F (36.2  C) Axillary 65 25 97 % --   10/20/21 1600 102/72 98.1  F (36.7  C) Oral 64 11 94 % --   10/20/21 1232 -- 97.1  F (36.2  C) Axillary -- -- -- --   10/20/21 1200 99/63 -- -- 64 11 97 % --       Temperatures:  Current - Temp: 97.9  F (36.6  C); Max - Temp  Av.9  F (36.6  C)  Min: 97.4  F (36.3  C)  Max: 98.4  F (36.9  C)  Respiration range: Resp  Av  Min: 15  Max: 28  Pulse range: Pulse  Av.3  Min: 44  Max:  93  Blood pressure range: Systolic (24hrs), Av , Min:86 , Max:132   ; Diastolic (24hrs), Av, Min:65, Max:108    Pulse oximetry range: SpO2  Av.7 %  Min: 75 %  Max: 98 %  I/O last 3 completed shifts:  In: 660 [P.O.:660]  Out: 1100 [Urine:1100]      Intake/Output Summary (Last 24 hours) at 10/20/2021 0811  Last data filed at 10/20/2021 0200  Gross per 24 hour   Intake 370 ml   Output 1050 ml   Net -680 ml     GENERAL: Pleasant man, lying in bed, appears comfortable.  EYES: Eyes grossly normal to inspection, extraocular movements intact  HENT: HFNC in place.  NECK: Trachea midline, no stridor.    RESP: No accessory muscle use.  Symmetrical breath sounds.  Lungs clear throughout on inspiration.  Expiration sounds mildly to moderately prolonged, no wheeze.  CV: Regular rate and rhythm, non-tachycardic.  Normal S1 S2, no murmur or extra sound.  No lower extremity edema.  ABDOMEN: Soft, non-tender, no guarding.  Liver and spleen not enlarged, no masses palpable.  Bowel sounds positive.  MS: No bony deformities noted.  No red or inflamed joints.  SKIN: Warm and dry, no rashes.  NEURO: Alert, oriented, conversant.  Cranial nerves III - XII grossly intact aside from being hard of hearing.  No gross motor or sensory deficits.    : Rose catheter in place.  PSYCH: Calm, alert, conversant.  Able to articulate logical thoughts, no tangential thoughts, no hallucinations or delusions.  Affect normal.                Data:     Results for orders placed or performed during the hospital encounter of 10/08/21 (from the past 24 hour(s))   Glucose by meter   Result Value Ref Range    GLUCOSE BY METER POCT 80 70 - 99 mg/dL   Glucose by meter   Result Value Ref Range    GLUCOSE BY METER POCT 100 (H) 70 - 99 mg/dL   Glucose by meter   Result Value Ref Range    GLUCOSE BY METER POCT 132 (H) 70 - 99 mg/dL   Glucose by meter   Result Value Ref Range    GLUCOSE BY METER POCT 128 (H) 70 - 99 mg/dL   Glucose by meter   Result Value  Ref Range    GLUCOSE BY METER POCT 210 (H) 70 - 99 mg/dL           Attestation:  I have reviewed today's vital signs, notes, medications, labs and imaging.  Amount of time spent in direct patient care providing critical care: 35 minutes.     Sander Ware MD   [facility]

## 2021-10-21 NOTE — PROGRESS NOTES
500 ml ff finished, 75 ml of jesse cloudy urine out from 1430 to 1700, continue to monitor closely., HR on pulse oximeter registers in the 30's, ? Non conducting paced, pvc beats, pacer was interrogated when transferred to ICU. Paced on monitor.  Per [employer] pacer functioning properly.

## 2021-10-21 NOTE — PROGRESS NOTES
"CLINICAL NUTRITION SERVICES - REASSESSMENT NOTE     Nutrition Prescription    RECOMMENDATIONS FOR MDs/PROVIDERS TO ORDER:  None    Malnutrition Status:    Patient does not meet two of the established criteria necessary for diagnosing malnutrition but is at risk for malnutrition.    Recommendations already ordered by Registered Dietitian (RD):  -sugar-free vanilla pudding w/ lunch.  -chocolate ice cream w/ supper.    Future/Additional Recommendations:  -Continue to monitor and encourage oral intake.  -Daily weights as ordered.     EVALUATION OF THE PROGRESS TOWARD GOALS   Diet: Orders Placed This Encounter      Combination Diet Consistent Carb 75 Grams CHO per Meal Diet; Easy to Chew (level 7); Thin Liquids (level 0)    Supplements: previously ordered Glucerna any flavor w/ breakfast, but discontinued after hearing patient isn't drinking them.    Intake:  -Eating % of meals per flowsheets  -Spoke w/ patient via telephone. He reports he has a \"great\" appetite. Intake was poor for the first few days of admission but now it's much improved.       NEW FINDINGS   -Encephalopathy appears resolved 10/15.    -Transferred to ICU 10/16 for slightly increased O2 needs. Acute pulmonary edema resolved. Currently on 7 L oximizer cannula.    -GI: patient reported intermittent abdominal cramping 10/19, benign exam. Small hard BM after dulcolax suppository and fleet enema. Pink lady enema given as patient felt he still needed to go. Did pass large amount of flatus and medium soft stool early morning 10/20. Abdominal discomfort resolved. CT abdomen/pelvis and chest on 10/19 showed moderate amount of stool in the colon.     -meds: Started diuresing IV lasix BID on 10/16, then reduced to once daily 10/17 but stopped 10/18 d/t mild TANIA. Resumed oral Lasix BID 10/20. Dexamethasone stopped 10/17.    -labs: K+ 3.9 (WNL), BUN 41 (H), Creatinine 1.12 (WNL), GFR 63, CRP inflammation 8.6 (H).    -weights: Per this writer's conversation w/ " patient's significant other Nathalia on 10/15, his UBW ranges from 196-200 lb. Patient has lost 6 lb (3% body weight) in the last 5 days, which is clinically significant. Given patient's adequate oral intake, this weight loss is likely in the setting of diuresis, although hypermetabolism w/ COVID-19 may be playing a small role.   Net IO Since Admission: -12,166.5 mL [10/21/21 1441]    Vitals:    10/10/21 0235 10/12/21 0300 10/16/21 0750 10/16/21 1100   Weight: 84.7 kg (186 lb 11.7 oz) 81.9 kg (180 lb 8.9 oz) 81.6 kg (179 lb 12.8 oz) 82.6 kg (182 lb 1.6 oz) admit to ICU.    10/17/21 0800 10/18/21 0500 10/19/21 0600 10/20/21 0630   Weight: 82 kg (180 lb 12.4 oz) 82.7 kg (182 lb 5.1 oz) 78.9 kg (173 lb 15.1 oz) 84 kg (185 lb 3 oz)    10/21/21 0600   Weight: 80.2 kg (176 lb 12.9 oz)       MALNUTRITION (10/21)  % Intake: No decreased intake noted - doing well.  % Weight Loss: 1-2% in 1 week (moderate) - suspect diuresis is playing a larger role, given adequate oral intake.  Subcutaneous Fat Loss: Unable to assess  Muscle Loss: Unable to assess  Fluid Accumulation/Edema: None noted  Malnutrition Diagnosis: Patient does not meet two of the established criteria necessary for diagnosing malnutrition but is at risk for malnutrition.    Previous Goals   Patient to consume % of nutritionally adequate meal trays TID, or the equivalent with supplements/snacks.  Evaluation: Met    Previous Nutrition Diagnosis  Inadequate oral intake related to poor appetite and increased needs secondary to acute infection as evidenced by oral intake meeting < 75% estimated needs for > 7 days.  Evaluation: Changed, see below.    CURRENT NUTRITION DIAGNOSIS  Predicted inadequate energy intake related to increased energy and oxygen needs secondary to acute infection with potential for decline in oral intake surrounding prolonged hospitalization.     INTERVENTIONS  Implementation  Collaboration with other providers: IDT rounds.    Modify composition  of meals/snacks    Goals  Patient to consume % of nutritionally adequate meal trays TID, or the equivalent with supplements/snacks.    Monitoring/Evaluation  Progress toward goals will be monitored and evaluated per protocol.    Verito Phillip RDN, LD  Clinical Dietitian  Office (Monday-Friday): [phone]  Weekend/Holiday Pager: [phone]

## 2021-10-21 NOTE — PROGRESS NOTES
[facility] TRANSPORT NOTE  Data:   Reason for Transport:  tx to med surg    Remington Les Gutierrez was transported to 2405 via wheel chair at 1814.  Patient was accompanied by Registered Nurse. Equipment used for transport: Oxygen  Nasal Cannula. Family was aware of reason for transport: yes    Action:  Report: given to Lauren CAN    Response:  Patient's condition when transferred off unit was stable.    Danielle Ramon RN

## 2021-10-21 NOTE — PROGRESS NOTES
Care Management Follow Up    Length of Stay (days): 13    Expected Discharge Date: 10/27/2021     Concerns to be Addressed: discharge planning     Patient plan of care discussed at interdisciplinary rounds: Yes    Anticipated Discharge Disposition: Home, Home Care     Anticipated Discharge Services: None  Anticipated Discharge DME: Oxygen    Patient/family educated on Medicare website which has current facility and service quality ratings: yes  Education Provided on the Discharge Plan:    Patient/Family in Agreement with the Plan: yes    Referrals Placed by CM/SW: Homecare  Private pay costs discussed: Not applicable    Additional Information:    Per the discussion in Care Progression, the Patient will likely discharge over the weekend with oxygen.  He will return home with resumption of [facility] (Phone: [phone] Fax: [phone]), RN for medication management.    Left a message for the Pt's son Tab regarding the discharge plan.    Plan:  Home with resumption of [facility].      Marie Calle RN

## 2021-10-21 NOTE — PLAN OF CARE
Pt c/o HA and given tylenol. Pt ports he is hungry and oatmeal ,milk and toast given, he is sitting upright in bed able to feed himself.Pt has a loose sounding cough, no resp distress at present.

## 2021-10-21 NOTE — PROGRESS NOTES
HFNC decreased to 45L 50% and sats remain stable, pt frequently moving hands, feet so frequently difficult to get accurate sat.   Complained of severe right chest area pain this am, increased with deep breathing, Tyl, motrin and oxy given with good results. Continue to wean oxygen as able. Pt is very [place], has remained alert, oriented to year, month, place and situation, confused on date, forgetful. Alarms on for safety.

## 2021-10-22 ENCOUNTER — APPOINTMENT (OUTPATIENT)
Dept: GENERAL RADIOLOGY | Facility: CLINIC | Age: 76
DRG: 177 | End: 2021-10-22
Attending: HOSPITALIST
Payer: COMMERCIAL

## 2021-10-22 LAB
ANION GAP SERPL CALCULATED.3IONS-SCNC: 5 MMOL/L (ref 3–14)
BASE EXCESS BLDA CALC-SCNC: -1.9 MMOL/L (ref -9–1.8)
BUN SERPL-MCNC: 35 MG/DL (ref 7–30)
CALCIUM SERPL-MCNC: 9.2 MG/DL (ref 8.5–10.1)
CHLORIDE BLD-SCNC: 105 MMOL/L (ref 94–109)
CO2 SERPL-SCNC: 27 MMOL/L (ref 20–32)
CREAT SERPL-MCNC: 1.29 MG/DL (ref 0.66–1.25)
GFR SERPL CREATININE-BSD FRML MDRD: 53 ML/MIN/1.73M2
GLUCOSE BLD-MCNC: 167 MG/DL (ref 70–99)
GLUCOSE BLDC GLUCOMTR-MCNC: 135 MG/DL (ref 70–99)
GLUCOSE BLDC GLUCOMTR-MCNC: 159 MG/DL (ref 70–99)
GLUCOSE BLDC GLUCOMTR-MCNC: 186 MG/DL (ref 70–99)
GLUCOSE BLDC GLUCOMTR-MCNC: 196 MG/DL (ref 70–99)
HCO3 BLD-SCNC: 22 MMOL/L (ref 21–28)
HOLD SPECIMEN: NORMAL
HOLD SPECIMEN: NORMAL
O2/TOTAL GAS SETTING VFR VENT: 95 %
PCO2 BLD: 36 MM HG (ref 35–45)
PH BLD: 7.4 [PH] (ref 7.35–7.45)
PO2 BLD: 61 MM HG (ref 80–105)
POTASSIUM BLD-SCNC: 4.3 MMOL/L (ref 3.4–5.3)
SODIUM SERPL-SCNC: 137 MMOL/L (ref 133–144)
TROPONIN I SERPL-MCNC: 0.03 UG/L (ref 0–0.04)

## 2021-10-22 PROCEDURE — 250N000011 HC RX IP 250 OP 636

## 2021-10-22 PROCEDURE — 93005 ELECTROCARDIOGRAM TRACING: CPT

## 2021-10-22 PROCEDURE — 999N000215 HC STATISTIC HFNC ADULT NON-CPAP

## 2021-10-22 PROCEDURE — 120N000001 HC R&B MED SURG/OB

## 2021-10-22 PROCEDURE — 82803 BLOOD GASES ANY COMBINATION: CPT | Performed by: HOSPITALIST

## 2021-10-22 PROCEDURE — 250N000013 HC RX MED GY IP 250 OP 250 PS 637

## 2021-10-22 PROCEDURE — 71045 X-RAY EXAM CHEST 1 VIEW: CPT

## 2021-10-22 PROCEDURE — 250N000013 HC RX MED GY IP 250 OP 250 PS 637: Performed by: FAMILY MEDICINE

## 2021-10-22 PROCEDURE — 36600 WITHDRAWAL OF ARTERIAL BLOOD: CPT

## 2021-10-22 PROCEDURE — 36415 COLL VENOUS BLD VENIPUNCTURE: CPT

## 2021-10-22 PROCEDURE — 84484 ASSAY OF TROPONIN QUANT: CPT | Performed by: HOSPITALIST

## 2021-10-22 PROCEDURE — 250N000011 HC RX IP 250 OP 636: Performed by: HOSPITALIST

## 2021-10-22 PROCEDURE — 80048 BASIC METABOLIC PNL TOTAL CA: CPT

## 2021-10-22 PROCEDURE — 99233 SBSQ HOSP IP/OBS HIGH 50: CPT

## 2021-10-22 RX ORDER — HYDROMORPHONE HCL IN WATER/PF 6 MG/30 ML
0.2 PATIENT CONTROLLED ANALGESIA SYRINGE INTRAVENOUS
Status: COMPLETED | OUTPATIENT
Start: 2021-10-22 | End: 2021-10-22

## 2021-10-22 RX ORDER — FUROSEMIDE 10 MG/ML
40 INJECTION INTRAMUSCULAR; INTRAVENOUS ONCE
Status: COMPLETED | OUTPATIENT
Start: 2021-10-22 | End: 2021-10-22

## 2021-10-22 RX ADMIN — LAMOTRIGINE 150 MG: 100 TABLET ORAL at 09:07

## 2021-10-22 RX ADMIN — BRIMONIDINE TARTRATE 1 DROP: 2 SOLUTION OPHTHALMIC at 23:00

## 2021-10-22 RX ADMIN — HYDROMORPHONE HYDROCHLORIDE 0.2 MG: 0.2 INJECTION, SOLUTION INTRAMUSCULAR; INTRAVENOUS; SUBCUTANEOUS at 03:33

## 2021-10-22 RX ADMIN — LAMOTRIGINE 150 MG: 100 TABLET ORAL at 23:00

## 2021-10-22 RX ADMIN — IBUPROFEN 400 MG: 400 TABLET, FILM COATED ORAL at 09:07

## 2021-10-22 RX ADMIN — PAROXETINE HYDROCHLORIDE 40 MG: 20 TABLET, FILM COATED ORAL at 23:01

## 2021-10-22 RX ADMIN — HYDROMORPHONE HYDROCHLORIDE 0.2 MG: 0.2 INJECTION, SOLUTION INTRAMUSCULAR; INTRAVENOUS; SUBCUTANEOUS at 04:23

## 2021-10-22 RX ADMIN — UMECLIDINIUM 1 PUFF: 62.5 AEROSOL, POWDER ORAL at 09:12

## 2021-10-22 RX ADMIN — INSULIN ASPART 2 UNITS: 100 INJECTION, SOLUTION INTRAVENOUS; SUBCUTANEOUS at 12:55

## 2021-10-22 RX ADMIN — OXYCODONE 5 MG: 5 TABLET ORAL at 02:58

## 2021-10-22 RX ADMIN — ALBUTEROL SULFATE 2 PUFF: 90 AEROSOL, METERED RESPIRATORY (INHALATION) at 09:07

## 2021-10-22 RX ADMIN — LORAZEPAM 1 MG: 1 TABLET ORAL at 23:00

## 2021-10-22 RX ADMIN — BRIMONIDINE TARTRATE 1 DROP: 2 SOLUTION OPHTHALMIC at 09:12

## 2021-10-22 RX ADMIN — DOXYCYCLINE HYCLATE 50 MG: 50 CAPSULE ORAL at 09:10

## 2021-10-22 RX ADMIN — ALBUTEROL SULFATE 2 PUFF: 90 AEROSOL, METERED RESPIRATORY (INHALATION) at 17:32

## 2021-10-22 RX ADMIN — ALBUTEROL SULFATE 2 PUFF: 90 AEROSOL, METERED RESPIRATORY (INHALATION) at 23:00

## 2021-10-22 RX ADMIN — ALBUTEROL SULFATE 2 PUFF: 90 AEROSOL, METERED RESPIRATORY (INHALATION) at 12:56

## 2021-10-22 RX ADMIN — FUROSEMIDE 40 MG: 10 INJECTION, SOLUTION INTRAMUSCULAR; INTRAVENOUS at 04:23

## 2021-10-22 RX ADMIN — ENOXAPARIN SODIUM 40 MG: 100 INJECTION SUBCUTANEOUS at 09:06

## 2021-10-22 RX ADMIN — SIMVASTATIN 20 MG: 20 TABLET, FILM COATED ORAL at 23:01

## 2021-10-22 ASSESSMENT — ACTIVITIES OF DAILY LIVING (ADL)
ADLS_ACUITY_SCORE: 10
ADLS_ACUITY_SCORE: 11
ADLS_ACUITY_SCORE: 10
ADLS_ACUITY_SCORE: 11
ADLS_ACUITY_SCORE: 10

## 2021-10-22 ASSESSMENT — MIFFLIN-ST. JEOR: SCORE: 1530.12

## 2021-10-22 NOTE — PROGRESS NOTES
When writer started shift @ 1900 on 10/21/2021 patient was on 2L NC with oxygen saturation 90%. Denied pain or other discomforts. Oxygen increased to 3L NC for decreased sats in the 80's at 2015. Oxygen increased to 4L NC for decreased sats again in the 80's @ 2230. Patient denies discomforts and rested in bed with eyes closed.   Writer heard alarm sounding in patients room. Patient had removed his NC and was pulling at his catheter. Sat were hard to obtain d/t hands being cold. Hands warmed sats were found to be in the high 70's to low 80's. Oxymask applied with oxygen @ 12L to get sats in the low 90's. Sats decreased again oxygen via oxymask increased to 15L. RT called to come assess patient @ 0220. RT placed patient on HFNC 60L 95%. Charge nursed notified. Charge nurse was placing page to [employer] for continued care. Patient c/o R chest pain 6/10 localized around the nipple area. Patient given oxycodone 5 mg PO @ 0258. Orders for EKG, Chest x-ray, and ABG. Awaited results for further orders. Patient continued to ml/o R chest pain and was rubbing area and became very restless. Patient give dilaudid 0.2 mg IVP @0333 and 0423. Patient was able to rest. Remains on HFNC 60L 95%. Given 40 mg IVP lasix. Lung sound were diminished throughout the noc shift.

## 2021-10-22 NOTE — SIGNIFICANT EVENT
Significant Event Note    Time of event: 3:07 AM October 22, 2021    Description of event:  Chest pain and increasing oxygen requirements    Plan:  Will check CXR, EKG, trops, ABG  PRN IV dilaudid x 2    Ander Kumar MD    4:16 AM  EKG is paced, ABG shows significant hypoxia, CXR showing worsening infiltrates bilataerally    Will give 1 x dose of IV lasix as patient did receive a bolus of NS 500ml yesterday.

## 2021-10-22 NOTE — PLAN OF CARE
Problem: Diabetes Comorbidity  Goal: Blood Glucose Level Within Targeted Range  Outcome: No Change  Blood sugar check ACHS treated with sliding scale insulin.      Problem: Risk for Delirium  Goal: Improved Sleep  Outcome: Improving  Will bundle cares so that patient has longer periods of time to sleep/      Problem: Gas Exchange Impaired  Goal: Optimal Gas Exchange  Intervention: Optimize Oxygenation and Ventilation  4L per NC to maintain sats in mid 90'

## 2021-10-22 NOTE — PROGRESS NOTES
Care Management Follow Up    Length of Stay (days): 14    Expected Discharge Date: 10/24/2021     Concerns to be Addressed: discharge planning     Patient plan of care discussed at interdisciplinary rounds: Yes    Anticipated Discharge Disposition: Home, Home Care     Anticipated Discharge Services: None  Anticipated Discharge DME: Oxygen    Patient/family educated on Medicare website which has current facility and service quality ratings: yes  Education Provided on the Discharge Plan:    Patient/Family in Agreement with the Plan: yes    Referrals Placed by CM/SW: Homecare  Private pay costs discussed: Not applicable    Additional Information:    Per MD rounds, pt's 02 needs increased overnight and is currently on HFNC. CTS will continue to follow pt via MD rounds and chart review.     Pt had planned to return home with resumption of [facility] (Phone: [phone] Fax: [phone]), RN for medication management.     CTS to remain involved.       EAMON Pang  Care Management, [facility] [place]  [phone]

## 2021-10-22 NOTE — PROGRESS NOTES
Writer paged [facility] to provide Dr Kumar an update and to request an order for tele med:    Read - 5:26 am  Pt [address]: HFNC 95% 60Ltr O2 sats 98%. HR 40 BPM, double checked manually. Approximately 250 mL out since Lasix administration at 0430. Patient is comfortable, sleeping lightly, easily rousable. Can we have an order for telemetry?  JANELLE Kumar - 5:27 am  Order placed  SB  Delivered - 5:28 am  Thank you.    Tele was placed.    Von Lyle RN

## 2021-10-22 NOTE — PROGRESS NOTES
Change in patient status at 0245  Increased oxygen needs  Sharp pain in right side    Writer contacted [employer]  Read - 2:55 am  Pt [address] Increased O2 to 15 LPM satting 90-91%. Pain in right chest. Can we have order for Chest X Ray/ Chest CT w contrast check for PE (Chest CT was done 18th)/ and order for HFNC? Can we have order for EKG? Also can we have order for IV hydromorphone for chest pain? Thanks  SB  Read - 3:09 am  May be best to start with Chest X Ray flat and upright in bed with portable machine. Presents more as muscular than PE.  IC  Ander Stacy - 3:09 am  Placed orders  IC  Ander Stacy - 3:10 am  But patient is more hypoxic as well?  SB  Read - 3:10 am  Yes much more hypoxic  SB  Read - 3:10 am  Maxed HFNC now, 88%  IC  Ander Stacy - 3:11 am  I see probably not safe to bring to CT. Will place portable CXR  SB  Read - 3:11 am  Sounds good  SB  Read - 3:22 am  Patient is restless and confused, this has been intermittent confusion, has ativan scheduled HS. Can we have a dose of ativan ordered?  SB  Read - 3:22 am  Confusion has been intermittent over last few weeks with us, right now is constant  Ander Stacy - 3:24 am  Let's see the blood gas first. Will place an ativan order if ok    Tele Med Dr Kumar was available to assess the patient via the [email or web address] monitor at approximate 0415.  Patient was slightly more comfortable in regards to pain in right chest at this time and was satting 96-98% on HFNC 95% 60Ltr at this time.  Dr Kumar cited the chest X ray showing worsening infiltrates, looked at the ABG results, then ordered Lasix, this was administered approximately 0430.     Von Lyle RN

## 2021-10-22 NOTE — PROGRESS NOTES
[facility] Progress Note           Assessment & Plan        Remington Gutierrez is a 75 year old male admitted on 10/8/2021. [facility] with history of COPD, systolic heart failure, S/P AICD, type 2 diabetes, and anxiety/depression. He presented with increasing shortness of breath for 4 days, especially with activity and lying down, nonproductive cough, and was found to be COVID positive.     # Acute Hypoxic Respiratory Failure secondary to COVID-19 infection  # Viral Pneumonia secondary to COVID-19 infection     Symptom Onset 10/4/2021   Date of 1st Positive Test 10/8/2021   Vaccination Status Partially Vaccinated - 1st dose < 1 week prior to onset of symptoms             Initial chest x-ray demonstrated bilateral interstitial and groundglass opacities consistent with pneumonia.  He initially required oxygen at 5 L/min per nasal cannula, though he came more hypoxemic 10/9/2021, requiring the use of HFNC oxygen.  Earlier today, he was titrated down to a 4 L simple mask.  However, HFNC oxygen was resumed, for some reason currently at 60 L/min and FiO2 1.0, on which he saturates at 100%.  He denies rest dyspnea.  He had some right anterior pleuritic chest pain earlier, which has resolved without therapy.  Cough is minimal, and is nonproductive.  He is afebrile.        - Oxygen: The patient's RN is going to request that RT try to resume O2 via simple mask when they have a moment.  HFNC will continue to be available if needed, though I do not think we need flow or FiO2 as high as we are currently using.    - Labs:   .0 --> 100.0 --> 50.5 --> 34.3 --> 16.2 --> 4.4 --> 3.0 --> 2.9 --> 2.9 --> 2.9 --> 8.6.   --> 714 --> 637 --> 660  fibrinogen 633 --> 677 --> 668 --> 650 --> 556  d-Dimer 1.43 --> 1.17 --> 0.67 --> 0.64 --> 0.61 --> 0.85 --> 0.74  Given clinical improvement in overall trend toward recovery and inflammatory markers.  Daily monitoring was discontinued 10/21/2021.  -  Imaging: Interval chest film was obtained this morning, 10/21/2021.  This demonstrates an increase in diffuse bilateral infiltrates by comparison to a film of 10/16/2021, particularly on the right side.  - Breathing treatments:  Continue albuterol HFA and home tiotropium inhalers as below; avoid nebulizers in favor of MDIs.  - IV fluids: None currently indicated.  - Antibiotics: Not indicated, though given the increasing infiltrates on chest x-ray today, I am going to send a WBC and procalcitonin.  He is not producing any sputum for culture.  - COVID-Focused Medications:   Dexamethasone 6 mg daily given 10/8 - 10/17/2021.  Remdesivir was eventually started 10/10/2021 once renal function improved, 5 day course completed 10/14/2021.  Tocilizumab 600 mg IV given 10/10/2021 after discussion with Dr. Tapia, infectious disease.    - DVT Prophylaxis:   On admission started heparin 5000 units every 8 hours due to TANIA, which was then changed to a low intensity heparin infusion when hypoxemia worsened.  Kidney injury subsequently improved, and the patient was clinically stable in terms of O2 needs, so heparin was discontinued and enoxaparin 0.5 mg/kg subcu every 12 hours started and continues.  Consider anticoag on discharge for 30 days & until return to normal mobility.    Volume overload pulmonary edema adding to hypoxemia  Oxygen needs began to increase 10/16/2021.  Chest x-ray 10/16/2021 demonstrated continued bilateral interstitial and groundglass opacities consistent with COVID-19 pneumonia, along with new areas of airspace opacity in the lateral right lower lobe.  CT chest 10/16/2021 demonstrated moderate diffuse opacities consistent with COVID-19 pneumonia; no new infiltrates were described.  Furosemide 20 mg IV twice daily was resumed 10/16/2021, and continued through 10/18/2021, at which point it had to be stopped due to decreasing GFR.  Furosemide was briefly resumed, then stopped again 10/21/2021 due to mild  hypotension.  -Acute pulmonary edema is resolved.  Furosemide remains on hold due to mild hypotension.  Will resume it when BP stabilizes.     Acute Kidney Injury  Chronic kidney disease stage 2  Baseline GFR appears to be 60-65.  Admission GFR 29, likely representing acute kidney injury due to volume depletion from acute illness.  He received a 500 mL IV fluid bolus in ED, and preadmission lisinopril, furosemide, and Mobic were held.  Furosemide was resumed 10/12/2021. There was a gradual improvement in GFR: 29 --> 33 --> 38 --> 49 --> 60 -->64 as of 10/15/2021.  GFR again declined in response to IV furosemide diuresis started 10/16/2021.  Furosemide was held after the 10/18/2021 doses.  GFR continues to be variable: 49 --> 55 --> 63 --> 53 today.  Preadmission furosemide 20 mg twice daily has been on hold since 10/21/2021 due to mild hypotension.  -Continue to follow renal function, next labs tomorrow.  -Preadmission furosemide is on hold due to mild hypotension.  -Preadmission lisinopril remains on hold.  -Continue to hold Mobic until necessary for symptom control.     Acute encephalopathy due to COVID-19 and corticosteroids  Acute toxic encephalopathy due to zolpidem 10/12 - 10/13/2021  Possible mild cognitive impairment/dementia  Noted to have some cognitive impairment on chart review, but not quantified, and no specific diagnosis has been made.  He was more acutely confused in the ER, pulling out IV's x 2 and removing his oxygen.  However, at time of admission H+P he was improved and appeared at cognitive baseline.  There had been no additional confusion or agitation until the early morning hours of 10/13/2021, at which time the patient was noted to be confused, agitated, and taking out his HFNC cannula.  He had gotten zolpidem 6.25 mg p.o. at bedtime, not a new medication for him, though I thought maybe the combination of zolpidem and dexamethasone was enough to cause the morning episode.  Zolpidem was  stopped.  Ramelteon 8 mg p.o. at at bedtime was tried, though the patient slept poorly, and did not wish to continue it.  Lorazepam 1 mg p.o. nightly was started 10/14/2021.  The patient has tolerated prazepam well.  We have seen no confusion and delirium, or agitation.    -Zolpidem is discontinued.  -Ramelteon was ineffective (though only 1 dose given) and is discontinued.  -Continue lorazepam 1 mg p.o. nightly.     Chronic Systolic Congestive Heart Failure   Dilated Cardiomyopathy  Noted to have history of systolic CHF and dilated cardiomyopathy. Takes furosemide 20 mg twice daily PRN at baseline.  [facility]. Non-ishemic cardiomyopathy. ECHO 4/2021 - EF 25-30%. Severe LV dilation. Global hypokinesis. RVSF mildly reduced. Cardiac MRI 2/2021- c/w Non-ishemic cardiomyopathy. Ascending aorta 4.1 cm. Admitted with acute CHF [facility] 9/7-12/2021. Dry weight 202# per [facility] discharge summary 9/2021     On admission BNP elevated 3,907 (similar to 7/2021 ED visit for CHF/COPD). CXR on admission consistent with COVID pneumonia but not CHF.  Echocardiogram performed 10/11/2021 demonstrated a moderately dilated left ventricle, with severely reduced left ventricular systolic function, ejection fraction 30 to 35%.  Global hypokinesis was seen.  The patient required 2 days of IV diuresis for acute pulmonary edema, details above.  He appeared to be well compensated after IV furosemide, so preadmission furosemide 20 mg p.o. twice daily was resumed.  We have had to hold furosemide since 10/21/2021 due to mild hypotension.  -Lisinopril remains on hold.  Given improvement in renal function to baseline, it could be resumed if necessary.  -Continue to hold preadmission furosemide 20 mg p.o. twice daily until BP stabilizes.  -Follow daily weights and I/Os.        Abdominal discomfort due to constipation and urinary retention  Patient reported some intermittent abdominal cramping 10/19/2021.  Abdominal exam was unremarkable at that  time.  He was found to have some urinary retention, though abdominal discomfort persisted even after Rose catheter placement.  CT abdomen 10/19/2021 demonstrated a moderate amount of stool in the colon.  He was treated with a Dulcolax suppository and a fleets enema, though had no response.  An additional enema was given, with moderate BM in response.  Abdominal discomfort is now resolved.    -Resolved.    -Continue laxatives and stimulants as needed.      Essential hypertension   Managed prior to admission with lisinopril 2.5 mg daily, metoprolol XL 25 mg daily and furosemide 20 mg p.o. twice daily.  Metoprolol had to be held 10/12/2021 due to bradycardia while also on remdesivir, then was resumed 10/16/2021 when remdesivir was complete.  Furosemide was resumed at preadmission dose.  Lisinopril has remained on hold, though could be resumed as renal function is back to baseline.  Blood pressure was running low as of 10/21/2021 morning, so furosemide has been put on hold.  Blood pressure has been variable, as low as 94 systolic and as high as 128 systolic within 6 hours.   -We will hold metoprolol until blood pressure recovers.  -We will leave furosemide on hold today and tonight, then reassess BP tomorrow.  He appears to need the furosemide to avoid volume overload, so we will continue 20 mg p.o. twice daily for now.     Bradycardia  The patient had heart rates intermittently as low as 38/min.  I am a little surprised to hear that he can achieve heart rates this low as he has a pacer/AICD device in place, which was functioning on admission EKG.  Nonetheless, it does not appear that he has had significant associated symptoms.  Bradycardia was probably a result of combined remdesivir and metoprolol therapy.  Because I wanted him to complete 5 days of remdesivir, I put a temporary hold on metoprolol.  Bradycardia improved once remdesivir was complete, and metoprolol was resumed.  Note that metoprolol was put on hold  10/21/2021 due to mild hypotension, described above.  -Observe heart rate while metoprolol is on hold.    COPD    Noted on chart review. PFTs unavailable. Not on home oxygen.  Exam suggests mild to moderate expiratory phase prolongation, without wheeze.  -Continued preadmission albuterol scheduled 4 times daily and every 4 hours as needed.  -Continue preadmission tiotropium.     Elevated troponin - suspect strain (Type 2 MI) due to COVID infection  Initial troponin 0.107. ECG showed paced rhythm, limiting interpretation. Asymptomatic; patient reported no chest pain prior to admission or since admission.  Suspect this elevation represented strain related to COVID pneumonia and hypoxemic failure.  Next troponins were normal at 0.023 --> 0.019.  -No additional troponin monitoring planned.     Diabetes Mellitus, Type 2  Managed prior to admission with empagliflozin 12.5 mg daily, glipizide 5 mg daily, and metformin 500 mg twice daily.  These outpatient medications have been held since admission. Hemoglobin A1c 7.2 on 10/5/2021.  The patient became hyperglycemic after dexamethasone was added, so insulin glargine, prandial NovoLog, and NovoLog medium resistance sliding scale were added and titrated up.  Dexamethasone is now complete, and hyperglycemia is improving.  He is currently receiving glargine 15 units subcu every morning and a NovoLog medium insulin resistance sliding scale.  Glucose control has been generally good, 147 - 196 over the past 5 checks.    -Continue glargine 15 units subcu every morning.  -Continue NovoLog medium resistance sliding scale.  -We will hold empagliflozin, glipizide, and Metformin for now, though will start reintroducing these when closer to discharge.  It is unlikely that the patient will need to be discharged on insulin.     BENNY  Mild, but severe supine by sleep study 2008. Reports using CPAP at home, not currently available for use here.  -Consider resumption of home CPAP when COVID-19  recovered and when off of high flow nasal cannula oxygen.     Anxiety and Depression  Insomnia  Previously diagnosed, and managed prior to admission with lamotrigine 150 mg twice daily and paroxetine 40 mg p.o. nightly, both continued here.  Mood and affect have been stable this hospital stay.    -Continue preadmission lamotrigine and paroxetine.  -Out of concern that zolpidem is causing morning agitated delirium, I stopped zolpidem on 10/13/2021.  He did markedly better on lorazepam 1 mg p.o. nightly, with encephalopathy resolved as above.       Sinus node dysfunction   S/P AICD 5/4/2021  Paced rhythm noted on admission ECG.  I cannot find details in the patient's past medical records as to the properties of his implantable device.  However, it does appear to be a pacemaker based upon the appearance of his admission EKG.  He continues to have mild, episodic bradycardia, as low as 44/min within the last 24 hours.  I am not sure why bradycardia is occurring with the pacer in place.  -Consider outpatient pacemaker interrogation.     Pulmonary nodule  A 9 mm RLL nodule was seen incidentally on CT chest.  -Advise follow-up CT approximately 1/16/2022 (3 months).    Rosacea  Continue home doxycycline daily.       DVT Prophylaxis: Enoxaparin 40 mg subcutaneous Q24H.  Rose Catheter: Placed 10/19/2021 due to urinary retention and abdominal discomfort.  Central Lines: None  Code Status: Full Code       Diet  Orders Placed This Encounter      Combination Diet Consistent Carb 75 Grams CHO per Meal Diet; Easy to Chew (level 7); Thin Liquids (level 0)        Disposition  Despite increase in bilateral infiltrates on the 10/22/2021 chest x-ray by comparison to 10/16/2021, the patient's symptoms and oxygenation continue to improve.  He was able to spend some time on a 4 L simple mask earlier today.  To investigate these infiltrates a bit, I am going to send a WBC and procalcitonin.  He is not producing sputum that can be  "cultured.            Interval History:   \"I cannot hear a thing\".  His hearing aids need to be adjusted, so he is not wearing them.  With a HEPA filter in his room, I have to yell to be understood.  I asked him if he was still having the right anterior pleuritic chest pain that he described to his RN this morning.  He says that he is not.  He described that as a \"catch\" during position change.  He is not short of breath at rest on HFNC.  He does get short of breath with exertion, though does not quantitate.  He is coughing very little.  Appetite is fair.  Denies dysphagia, nausea, or vomiting.           Review of Systems:    ROS: 10 point ROS neg other than the symptoms noted above in the HPI.           Medications:   Current active medications and PTA medications reviewed, see medication list for details.            Physical Exam:   Vitals were reviewed  Patient Vitals for the past 24 hrs:   BP Temp Temp src Pulse Resp SpO2 Weight   10/22/21 1258 97/64 97.3  F (36.3  C) Oral 72 22 100 % --   10/22/21 0801 (!) 94/37 98  F (36.7  C) Oral (!) 46 22 92 % --   10/22/21 0300 -- -- -- -- -- -- 82.7 kg (182 lb 5.1 oz)   10/22/21 0200 128/48 97.9  F (36.6  C) Oral -- -- 90 % --   10/21/21 2229 104/66 97.7  F (36.5  C) Oral 60 20 90 % --   10/21/21 222 104/66 -- -- 54 -- (!) 85 % --   10/21/21 2000 99/42 96.8  F (36  C) Oral (!) 45 -- 90 % --   10/21/21 1700 93/59 -- -- 66 20 95 % --   10/21/21 1542 -- -- -- -- -- 95 % --   10/21/21 1528 -- -- -- -- -- 93 % --   10/21/21 1500 90/56 -- -- 61 16 93 % --   10/21/21 1441 -- 98  F (36.7  C) Axillary 63 24 97 % --   10/21/21 1420 -- -- -- -- -- 97 % --   10/21/21 1415 -- -- -- 76 22 94 % --   10/21/21 1400 98/55 -- -- 63 (!) 31 92 % --       Temperatures:  Current - Temp: 97.9  F (36.6  C); Max - Temp  Av.9  F (36.6  C)  Min: 97.4  F (36.3  C)  Max: 98.4  F (36.9  C)  Respiration range: Resp  Av  Min: 15  Max: 28  Pulse range: Pulse  Av.3  Min: 44  Max: 93  Blood " pressure range: Systolic (24hrs), Av , Min:86 , Max:132   ; Diastolic (24hrs), Av, Min:65, Max:108    Pulse oximetry range: SpO2  Av.7 %  Min: 75 %  Max: 98 %  I/O last 3 completed shifts:  In: 1540 [P.O.:1040; IV Piggyback:500]  Out: 975 [Urine:975]      Intake/Output Summary (Last 24 hours) at 10/20/2021 0811  Last data filed at 10/20/2021 0200  Gross per 24 hour   Intake 370 ml   Output 1050 ml   Net -680 ml     GENERAL: Pleasant man, lying in bed, appears comfortable.  EYES: Eyes grossly normal to inspection, extraocular movements intact  HENT: HFNC in place.  NECK: Trachea midline, no stridor.    RESP: No accessory muscle use.  Symmetrical breath sounds.  Lungs clear throughout on inspiration.  Expiration sounds mildly to moderately prolonged, no wheeze.  CV: Regular rate and rhythm, non-tachycardic.  Normal S1 S2, no murmur or extra sound.  No lower extremity edema.  ABDOMEN: Soft, non-tender, no guarding.  Liver and spleen not enlarged, no masses palpable.  Bowel sounds positive.  MS: No bony deformities noted.  No red or inflamed joints.  SKIN: Warm and dry, no rashes.  NEURO: Alert, oriented, conversant.  Cranial nerves III - XII grossly intact aside from being hard of hearing.  No gross motor or sensory deficits.    : Rose catheter in place.  PSYCH: Calm, alert, conversant.  Able to articulate logical thoughts, no tangential thoughts, no hallucinations or delusions.  Affect a bit flat today.                Data:     Results for orders placed or performed during the hospital encounter of 10/08/21 (from the past 24 hour(s))   Glucose by meter   Result Value Ref Range    GLUCOSE BY METER POCT 147 (H) 70 - 99 mg/dL   Glucose by meter   Result Value Ref Range    GLUCOSE BY METER POCT 193 (H) 70 - 99 mg/dL   Blood gas arterial   Result Value Ref Range    pH Arterial 7.40 7.35 - 7.45    pCO2 Arterial 36 35 - 45 mm Hg    pO2 Arterial 61 (L) 80 - 105 mm Hg    FIO2 95     Bicarbonate Arterial 22 21  - 28 mmol/L    Base Excess/Deficit (+/-) -1.9 -9.0 - 1.8 mmol/L   XR Chest Port 1 View    Narrative    EXAM: XR CHEST PORT 1 VIEW  LOCATION: [facility]  DATE/TIME: 10/22/2021 3:23 AM    INDICATION: Shortness of breath. Hypoxia.  COMPARISON: 10/16/2021.    FINDINGS: Left subclavian cardiac device. No pneumothorax. The heart is enlarged. There are increased bilateral pulmonary infiltrates, worst in the right lung laterally. Fusion hardware in the cervical spine. Old left clavicle fracture.      Impression    IMPRESSION: Increasing bilateral pneumonia.   Basic metabolic panel   Result Value Ref Range    Sodium 137 133 - 144 mmol/L    Potassium 4.3 3.4 - 5.3 mmol/L    Chloride 105 94 - 109 mmol/L    Carbon Dioxide (CO2) 27 20 - 32 mmol/L    Anion Gap 5 3 - 14 mmol/L    Urea Nitrogen 35 (H) 7 - 30 mg/dL    Creatinine 1.29 (H) 0.66 - 1.25 mg/dL    Calcium 9.2 8.5 - 10.1 mg/dL    Glucose 167 (H) 70 - 99 mg/dL    GFR Estimate 53 (L) >60 mL/min/1.73m2   Troponin I   Result Value Ref Range    Troponin I 0.028 0.000 - 0.045 ug/L   Extra Tube    Narrative    The following orders were created for panel order Extra Tube.  Procedure                               Abnormality         Status                     ---------                               -----------         ------                     Extra Blue Top Tube[[id number]]                              Final result               Extra Purple Top Tube[[id number]]                            Final result                 Please view results for these tests on the individual orders.   Extra Blue Top Tube   Result Value Ref Range    Hold Specimen JIC    Extra Purple Top Tube   Result Value Ref Range    Hold Specimen JIC    Glucose by meter   Result Value Ref Range    GLUCOSE BY METER POCT 159 (H) 70 - 99 mg/dL   Glucose by meter   Result Value Ref Range    GLUCOSE BY METER POCT 196 (H) 70 - 99 mg/dL           Attestation:  I have reviewed today's vital signs,  notes, medications, labs and imaging.  Amount of time spent in hospital follow-up: 35 minutes.     Sander Ware MD   [facility]

## 2021-10-23 LAB
ANION GAP SERPL CALCULATED.3IONS-SCNC: 4 MMOL/L (ref 3–14)
BUN SERPL-MCNC: 32 MG/DL (ref 7–30)
CALCIUM SERPL-MCNC: 9 MG/DL (ref 8.5–10.1)
CHLORIDE BLD-SCNC: 103 MMOL/L (ref 94–109)
CO2 SERPL-SCNC: 26 MMOL/L (ref 20–32)
CREAT SERPL-MCNC: 1.12 MG/DL (ref 0.66–1.25)
ERYTHROCYTE [DISTWIDTH] IN BLOOD BY AUTOMATED COUNT: 12.5 % (ref 10–15)
GFR SERPL CREATININE-BSD FRML MDRD: 63 ML/MIN/1.73M2
GLUCOSE BLD-MCNC: 161 MG/DL (ref 70–99)
GLUCOSE BLDC GLUCOMTR-MCNC: 146 MG/DL (ref 70–99)
GLUCOSE BLDC GLUCOMTR-MCNC: 166 MG/DL (ref 70–99)
GLUCOSE BLDC GLUCOMTR-MCNC: 171 MG/DL (ref 70–99)
GLUCOSE BLDC GLUCOMTR-MCNC: 191 MG/DL (ref 70–99)
GLUCOSE BLDC GLUCOMTR-MCNC: 219 MG/DL (ref 70–99)
HCT VFR BLD AUTO: 46.1 % (ref 40–53)
HGB BLD-MCNC: 15.6 G/DL (ref 13.3–17.7)
MCH RBC QN AUTO: 32.2 PG (ref 26.5–33)
MCHC RBC AUTO-ENTMCNC: 33.8 G/DL (ref 31.5–36.5)
MCV RBC AUTO: 95 FL (ref 78–100)
PLATELET # BLD AUTO: 174 10E3/UL (ref 150–450)
POTASSIUM BLD-SCNC: 4 MMOL/L (ref 3.4–5.3)
PROCALCITONIN SERPL-MCNC: 0.29 NG/ML
RBC # BLD AUTO: 4.84 10E6/UL (ref 4.4–5.9)
SODIUM SERPL-SCNC: 133 MMOL/L (ref 133–144)
WBC # BLD AUTO: 5.3 10E3/UL (ref 4–11)

## 2021-10-23 PROCEDURE — 999N000123 HC STATISTIC OXYGEN O2DAILY TECH TIME

## 2021-10-23 PROCEDURE — 250N000013 HC RX MED GY IP 250 OP 250 PS 637: Performed by: FAMILY MEDICINE

## 2021-10-23 PROCEDURE — 80048 BASIC METABOLIC PNL TOTAL CA: CPT

## 2021-10-23 PROCEDURE — 999N000157 HC STATISTIC RCP TIME EA 10 MIN

## 2021-10-23 PROCEDURE — 250N000011 HC RX IP 250 OP 636

## 2021-10-23 PROCEDURE — 250N000012 HC RX MED GY IP 250 OP 636 PS 637

## 2021-10-23 PROCEDURE — 85014 HEMATOCRIT: CPT

## 2021-10-23 PROCEDURE — 36415 COLL VENOUS BLD VENIPUNCTURE: CPT

## 2021-10-23 PROCEDURE — 99232 SBSQ HOSP IP/OBS MODERATE 35: CPT

## 2021-10-23 PROCEDURE — 84145 PROCALCITONIN (PCT): CPT

## 2021-10-23 PROCEDURE — 250N000013 HC RX MED GY IP 250 OP 250 PS 637

## 2021-10-23 PROCEDURE — 120N000001 HC R&B MED SURG/OB

## 2021-10-23 RX ADMIN — INSULIN ASPART 1 UNITS: 100 INJECTION, SOLUTION INTRAVENOUS; SUBCUTANEOUS at 17:35

## 2021-10-23 RX ADMIN — UMECLIDINIUM 1 PUFF: 62.5 AEROSOL, POWDER ORAL at 08:48

## 2021-10-23 RX ADMIN — INSULIN ASPART 1 UNITS: 100 INJECTION, SOLUTION INTRAVENOUS; SUBCUTANEOUS at 12:35

## 2021-10-23 RX ADMIN — ALBUTEROL SULFATE 2 PUFF: 90 AEROSOL, METERED RESPIRATORY (INHALATION) at 12:35

## 2021-10-23 RX ADMIN — PAROXETINE HYDROCHLORIDE 40 MG: 20 TABLET, FILM COATED ORAL at 22:08

## 2021-10-23 RX ADMIN — ALBUTEROL SULFATE 2 PUFF: 90 AEROSOL, METERED RESPIRATORY (INHALATION) at 08:46

## 2021-10-23 RX ADMIN — SIMVASTATIN 20 MG: 20 TABLET, FILM COATED ORAL at 22:08

## 2021-10-23 RX ADMIN — BRIMONIDINE TARTRATE 1 DROP: 2 SOLUTION OPHTHALMIC at 08:48

## 2021-10-23 RX ADMIN — LORAZEPAM 1 MG: 1 TABLET ORAL at 22:08

## 2021-10-23 RX ADMIN — ALBUTEROL SULFATE 2 PUFF: 90 AEROSOL, METERED RESPIRATORY (INHALATION) at 17:34

## 2021-10-23 RX ADMIN — DOXYCYCLINE HYCLATE 50 MG: 50 CAPSULE ORAL at 08:48

## 2021-10-23 RX ADMIN — LAMOTRIGINE 150 MG: 100 TABLET ORAL at 22:08

## 2021-10-23 RX ADMIN — ALBUTEROL SULFATE 2 PUFF: 90 AEROSOL, METERED RESPIRATORY (INHALATION) at 22:08

## 2021-10-23 RX ADMIN — LAMOTRIGINE 150 MG: 100 TABLET ORAL at 08:47

## 2021-10-23 RX ADMIN — ENOXAPARIN SODIUM 40 MG: 100 INJECTION SUBCUTANEOUS at 08:48

## 2021-10-23 RX ADMIN — BRIMONIDINE TARTRATE 1 DROP: 2 SOLUTION OPHTHALMIC at 22:09

## 2021-10-23 RX ADMIN — INSULIN GLARGINE 15 UNITS: 100 INJECTION, SOLUTION SUBCUTANEOUS at 08:50

## 2021-10-23 RX ADMIN — INSULIN ASPART 1 UNITS: 100 INJECTION, SOLUTION INTRAVENOUS; SUBCUTANEOUS at 08:48

## 2021-10-23 ASSESSMENT — ACTIVITIES OF DAILY LIVING (ADL)
ADLS_ACUITY_SCORE: 11
ADLS_ACUITY_SCORE: 10
ADLS_ACUITY_SCORE: 11
ADLS_ACUITY_SCORE: 10
ADLS_ACUITY_SCORE: 11
ADLS_ACUITY_SCORE: 10
ADLS_ACUITY_SCORE: 11
ADLS_ACUITY_SCORE: 10
ADLS_ACUITY_SCORE: 11
ADLS_ACUITY_SCORE: 10
ADLS_ACUITY_SCORE: 10
ADLS_ACUITY_SCORE: 11
ADLS_ACUITY_SCORE: 10
ADLS_ACUITY_SCORE: 10
ADLS_ACUITY_SCORE: 11
ADLS_ACUITY_SCORE: 10
ADLS_ACUITY_SCORE: 11
ADLS_ACUITY_SCORE: 10
ADLS_ACUITY_SCORE: 11
ADLS_ACUITY_SCORE: 10

## 2021-10-23 NOTE — PLAN OF CARE
Patient sleeping off and on during the day.  No complaints of pain.  B/P continues to be low - MD updated - medications held per MD's orders.  Oxygen level stable - high flow setting decreased from 60-95 to 55% at 25 LPM at end of shift.  Patient tolerating well.  Patient afebrile throughout shift.  Complained of mild tailbone pain - mepilex applied and encouraged to weight-shift and lay on side.  Patient ate well for evening meal, did not eat much for lunch.  RT contacted to discuss changing patient from high-flow back to oxymask - RT was unable to see patient this shift.  Will continue to monitor.  Maura Singletary RN 12:04 AM 10/23/2021

## 2021-10-23 NOTE — PLAN OF CARE
AOx4, up w/SBA and walker. Remains on 55% FiO2 25 LPM sats 94-96%, SOB w/exertion. Rose draining QS, sacral mepilex in place. Blood sugars 186 HS; 219 0200, denies pain, VSS. Rested comfortably throughout shift.

## 2021-10-23 NOTE — PLAN OF CARE
"Writer weaned patient off of High Flow and onto 15L Oxymask to maintain SPO >90%. Hypotensive today, MD aware but patient was asymptomatic. Otherwise vitally stable;    BP 90/62 (BP Location: Right arm)   Pulse 86   Temp 98.3  F (36.8  C) (Oral)   Resp 16   Ht 1.733 m (5' 8.23\")   Wt 82.7 kg (182 lb 5.1 oz)   SpO2 93%   BMI 27.54 kg/m      Discomfort pain noted by patient at currie site, catheter care was performed. But no other pain or nausea reported. A/Ox4, but waxes and wanes throughout the day. All alarms are on for this patient. Pt intermittently takes O2 off, but is easily redirectable and follows commands. Blanchable redness with soreness noted on sacrum/coccyx. Barrier cream and mepilex in place. No BM today, but passing gas.     Plan: O2 management.    Naomie Neves RN on 10/23/2021 at 2:42 PM      Problem: Adult Inpatient Plan of Care  Goal: Plan of Care Review  Outcome: Improving  Goal: Patient-Specific Goal (Individualized)  Outcome: Improving  Goal: Absence of Hospital-Acquired Illness or Injury  Outcome: Improving  Intervention: Identify and Manage Fall Risk  Recent Flowsheet Documentation  Taken 10/23/2021 0830 by Naomie Neves RN  Safety Promotion/Fall Prevention:   activity supervised   assistive device/personal items within reach   bed alarm on   nonskid shoes/slippers when out of bed   safety round/check completed  Intervention: Prevent Skin Injury  Recent Flowsheet Documentation  Taken 10/23/2021 0830 by Naomie Neves RN  Body Position: position changed independently  Intervention: Prevent and Manage VTE (Venous Thromboembolism) Risk  Recent Flowsheet Documentation  Taken 10/23/2021 0830 by Naomie Neves RN  VTE Prevention/Management: ambulation promoted  Goal: Optimal Comfort and Wellbeing  Outcome: Improving  Goal: Readiness for Transition of Care  Outcome: Improving     Problem: Risk for Delirium  Goal: Optimal Coping  Outcome: Improving  Goal: Improved Behavioral " Control  Outcome: Improving  Goal: Improved Attention and Thought Clarity  Outcome: Improving  Goal: Improved Sleep  Outcome: Improving     Problem: Discharge Planning  Goal: Discharge Planning (Adult, OB, Behavioral, Peds)  Outcome: Improving     Problem: Gas Exchange Impaired  Goal: Optimal Gas Exchange  Outcome: Improving  Intervention: Optimize Oxygenation and Ventilation  Recent Flowsheet Documentation  Taken 10/23/2021 0830 by Naomie Neves, RN  Head of Bed (HOB) Positioning: HOB at 60-90 degrees     Problem: COPD Comorbidity  Goal: Maintenance of COPD Symptom Control  Outcome: Improving     Problem: Diabetes Comorbidity  Goal: Blood Glucose Level Within Targeted Range  Outcome: Improving     Problem: Heart Failure Comorbidity  Goal: Maintenance of Heart Failure Symptom Control  Outcome: Improving     Problem: Hypertension Comorbidity  Goal: Blood Pressure in Desired Range  Outcome: Improving

## 2021-10-23 NOTE — PROGRESS NOTES
[facility]ist Progress Note           Assessment & Plan        Remington Gutierrez is a 75 year old male admitted on 10/8/2021. [facility] with history of COPD, systolic heart failure, S/P AICD, type 2 diabetes, and anxiety/depression. He presented with increasing shortness of breath for 4 days, especially with activity and lying down, nonproductive cough, and was found to be COVID positive.     # Acute Hypoxic Respiratory Failure secondary to COVID-19 infection  # Viral Pneumonia secondary to COVID-19 infection     Symptom Onset 10/4/2021   Date of 1st Positive Test 10/8/2021   Vaccination Status Partially Vaccinated - 1st dose < 1 week prior to onset of symptoms    End-isolation date 10/24/2021      Initial chest x-ray demonstrated bilateral interstitial and groundglass opacities consistent with pneumonia.  He initially required oxygen at 5 L/min per nasal cannula, though he came more hypoxemic 10/9/2021, requiring the use of HFNC oxygen.  On 10/22/2021, the patient was changed from an HFNC to a simple mask, though is still requiring 15 L/min.  Saturation is 97 to 99% during my visit, however, on 15 L.  He feels better, and asked if he will be able to go home tomorrow.  He has no rest dyspnea, and exertional dyspnea is mild.  Strength is globally improved.  Endurance is improving.  Cough persists, though is mild.  He has had no further respiratory chest pain.  He remains afebrile.         - Oxygen: It seems unlikely that we will need to go back to HFNC oxygen.  We will continue to titrate flow downward through the simple mask.  I told him that we could send him home with supplemental oxygen right around 4 to 5 L/min.  He is hopeful that he will be there tomorrow.  - Labs:   .0 --> 100.0 --> 50.5 --> 34.3 --> 16.2 --> 4.4 --> 3.0 --> 2.9 --> 2.9 --> 2.9 --> 8.6.   --> 714 --> 637 --> 660  fibrinogen 633 --> 677 --> 668 --> 650 --> 556  d-Dimer 1.43 --> 1.17 --> 0.67 --> 0.64  --> 0.61 --> 0.85 --> 0.74  Given clinical improvement in overall trend toward recovery and inflammatory markers.  Daily monitoring was discontinued 10/21/2021.  - Imaging: Most recent CXR was performed 10/21/2021, demonstrates an increase in diffuse bilateral infiltrates by comparison to a film of 10/16/2021, particularly on the right side.  - Breathing treatments:  Continue albuterol HFA and home tiotropium inhalers as below; avoid nebulizers in favor of MDIs.  - IV fluids: None currently indicated.  - Antibiotics: WBC today is normal.  I sent a procalcitonin that is mildly elevated at 0.29.  However, the patient is afebrile, with no clinical evidence of underlying infection.  I do not want to start antibiotics simply for a mild elevation in procalcitonin.  Will be on the look out for evidence of infection however.   - COVID-Focused Medications:   Dexamethasone 6 mg daily given 10/8 - 10/17/2021.  Remdesivir was eventually started 10/10/2021 once renal function improved, 5 day course completed 10/14/2021.  Tocilizumab 600 mg IV given 10/10/2021 after discussion with Dr. Tapia, infectious disease.    - DVT Prophylaxis:   On admission started heparin 5000 units every 8 hours due to TANIA, which was then changed to a low intensity heparin infusion when hypoxemia worsened.  Kidney injury subsequently improved, and the patient was clinically stable in terms of O2 needs, so heparin was discontinued and enoxaparin 40 mg Q24H continues.  Consider anticoag on discharge for 30 days & until return to normal mobility.    Volume overload pulmonary edema adding to hypoxemia  Oxygen needs began to increase 10/16/2021.  Chest x-ray 10/16/2021 demonstrated continued bilateral interstitial and groundglass opacities consistent with COVID-19 pneumonia, along with new areas of airspace opacity in the lateral right lower lobe.  CT chest 10/16/2021 demonstrated moderate diffuse opacities consistent with COVID-19 pneumonia; no new  infiltrates were described.  Furosemide 20 mg IV twice daily was resumed 10/16/2021, and continued through 10/18/2021, at which point it had to be stopped due to decreasing GFR.  Furosemide was briefly resumed, then stopped again 10/21/2021 due to mild hypotension.  -Acute pulmonary edema is resolved.  Furosemide remains on hold due to mild hypotension.  Will resume it when BP stabilizes.     Acute Kidney Injury  Chronic kidney disease stage 2  Baseline GFR appears to be 60-65.  Admission GFR 29, likely representing acute kidney injury due to volume depletion from acute illness.  He received a 500 mL IV fluid bolus in ED, and preadmission lisinopril, furosemide, and Mobic were held.  Furosemide was resumed 10/12/2021. There was a gradual improvement in GFR: 29 --> 33 --> 38 --> 49 --> 60 -->64 as of 10/15/2021.  GFR again declined in response to IV furosemide diuresis started 10/16/2021.  Furosemide was held after the 10/18/2021 doses.  GFR continues to be variable: 49 --> 55 --> 63 --> 53 --> 63 today.  Preadmission furosemide 20 mg twice daily has been on hold since 10/21/2021 due to mild hypotension.  -Continue to follow renal function intermittently.  -Preadmission furosemide is on hold due to mild hypotension.  -Preadmission lisinopril remains on hold.  -Continue to hold Mobic until necessary for symptom control.     Acute encephalopathy due to COVID-19 and corticosteroids  Acute toxic encephalopathy due to zolpidem 10/12 - 10/13/2021  Possible mild cognitive impairment/dementia  Noted to have some cognitive impairment on chart review, but not quantified, and no specific diagnosis has been made.  He was more acutely confused in the ER, pulling out IV's x 2 and removing his oxygen.  However, at time of admission H+P he was improved and appeared at cognitive baseline.  There had been no additional confusion or agitation until the early morning hours of 10/13/2021, at which time the patient was noted to be  confused, agitated, and taking out his HFNC cannula.  He had gotten zolpidem 6.25 mg p.o. at bedtime, not a new medication for him, though I thought maybe the combination of zolpidem and dexamethasone was enough to cause the morning episode.  Zolpidem was stopped.  Ramelteon 8 mg p.o. at at bedtime was tried, though the patient slept poorly, and did not wish to continue it.  Lorazepam 1 mg p.o. nightly was started 10/14/2021.  The patient has tolerated prazepam well.  We have seen no confusion and delirium, or agitation.  Today, he is awake, alert, appropriate in conversation.  -Zolpidem is discontinued.  -Ramelteon was ineffective (though only 1 dose given) and is discontinued.  -Continue lorazepam 1 mg p.o. nightly.     Chronic Systolic Congestive Heart Failure   Dilated Cardiomyopathy  Noted to have history of systolic CHF and dilated cardiomyopathy. Takes furosemide 20 mg twice daily PRN at baseline.  [facility]. Non-ishemic cardiomyopathy. ECHO 4/2021 - EF 25-30%. Severe LV dilation. Global hypokinesis. RVSF mildly reduced. Cardiac MRI 2/2021- c/w Non-ishemic cardiomyopathy. Ascending aorta 4.1 cm. Admitted with acute CHF [facility] 9/7-12/2021. Dry weight 202# per [facility] discharge summary 9/2021     On admission BNP elevated 3,907 (similar to 7/2021 ED visit for CHF/COPD). CXR on admission consistent with COVID pneumonia but not CHF.  Echocardiogram performed 10/11/2021 demonstrated a moderately dilated left ventricle, with severely reduced left ventricular systolic function, ejection fraction 30 to 35%.  Global hypokinesis was seen.  The patient required 2 days of IV diuresis for acute pulmonary edema, details above.  He appeared to be well compensated after IV furosemide, so preadmission furosemide 20 mg p.o. twice daily was resumed.  We have had to hold furosemide since 10/21/2021 due to mild hypotension.  There is no evidence of decompensation on exam today.  -Lisinopril remains on hold.  Given improvement  in renal function to baseline, it could be resumed if necessary.  -Continue to hold preadmission furosemide 20 mg p.o. twice daily until BP stabilizes.  -Follow daily weights and I/Os.        Abdominal discomfort due to constipation and urinary retention  Patient reported some intermittent abdominal cramping 10/19/2021.  Abdominal exam was unremarkable at that time.  He was found to have some urinary retention, though abdominal discomfort persisted even after Rose catheter placement.  CT abdomen 10/19/2021 demonstrated a moderate amount of stool in the colon.  He was treated with a Dulcolax suppository and a fleets enema, though had no response.  An additional enema was given, with moderate BM in response.  Abdominal discomfort resolved, and has not recurred.  -Resolved.    -Continue laxatives and stimulants as needed.      Essential hypertension   Managed prior to admission with lisinopril 2.5 mg daily, metoprolol XL 25 mg daily and furosemide 20 mg p.o. twice daily.  Metoprolol had to be held 10/12/2021 due to bradycardia while also on remdesivir, then was resumed 10/16/2021 when remdesivir was complete.  Furosemide was resumed at preadmission dose.  Lisinopril has remained on hold since admission.  Blood pressure was running low as of 10/21/2021 morning, so furosemide was subsequently put on hold.  Blood pressure continues to be variable, as low as 9 systolic and as high as 113 systolic over this shift.   -We will hold metoprolol until blood pressure recovers.  -Furosemide remains on hold until BP improves.  -Lisinopril has been on hold since admission, and will remain on hold.     Bradycardia  The patient had heart rates intermittently as low as 38/min.  I am a little surprised to hear that he can achieve heart rates this low as he has a pacer/AICD device in place, which was functioning on admission EKG.  Nonetheless, it does not appear that he has had significant associated symptoms.  Bradycardia was probably  a result of combined remdesivir and metoprolol therapy.  Because I wanted him to complete 5 days of remdesivir, I put a temporary hold on metoprolol.  Bradycardia improved once remdesivir was complete, and metoprolol was resumed.  Note that metoprolol was put on hold 10/21/2021 due to mild hypotension, described above.  Bradycardia has improved since we stopped metoprolol, last 2 heart rates in the 80s.  -Continue to observe heart rate while metoprolol is on hold.  It may not be possible to resume beta-blocker therapy.    COPD    Noted on chart review. PFTs unavailable. Not on home oxygen.  Exam suggests mild to moderate expiratory phase prolongation, without wheeze.  -Continued preadmission albuterol scheduled 4 times daily and every 4 hours as needed.  -Continue preadmission tiotropium.     Elevated troponin - suspect strain (Type 2 MI) due to COVID infection  Initial troponin 0.107. ECG showed paced rhythm, limiting interpretation. Asymptomatic; patient reported no chest pain prior to admission or since admission.  Suspect this elevation represented strain related to COVID pneumonia and hypoxemic failure.  Next troponins were normal at 0.023 --> 0.019.  -No additional troponin monitoring planned.     Diabetes Mellitus, Type 2  Managed prior to admission with empagliflozin 12.5 mg daily, glipizide 5 mg daily, and metformin 500 mg twice daily.  These outpatient medications have been held since admission. Hemoglobin A1c 7.2 on 10/5/2021.  The patient became hyperglycemic after dexamethasone was added, so insulin glargine, prandial NovoLog, and NovoLog medium resistance sliding scale were added and titrated up.  Dexamethasone is now complete, and hyperglycemia is improving.  He is currently receiving glargine 15 units subcu every morning and a NovoLog medium insulin resistance sliding scale.  Glucose control has been generally good, 146 - 219 over the past 5 checks.    -Continue glargine 15 units subcu every  morning.  -Continue NovoLog medium resistance sliding scale.  -We will hold empagliflozin, glipizide, and Metformin for now, though will start reintroducing these when closer to discharge.  It is unlikely that the patient will need to be discharged on insulin.     BENNY  Mild, but severe supine by sleep study 2008. Reports using CPAP at home, not currently available for use here.  -Consider resumption of home CPAP when COVID-19 recovered and when off of high flow nasal cannula oxygen.     Anxiety and Depression  Insomnia  Previously diagnosed, and managed prior to admission with lamotrigine 150 mg twice daily and paroxetine 40 mg p.o. nightly, both continued here.  Mood and affect have been stable this hospital stay.    -Continue preadmission lamotrigine and paroxetine.  -Out of concern that zolpidem was causing morning agitated delirium, I stopped zolpidem on 10/13/2021.  He did markedly better on lorazepam 1 mg p.o. nightly, with encephalopathy resolved as above.       Sinus node dysfunction   S/P AICD 5/4/2021  Paced rhythm noted on admission ECG.  I cannot find details in the patient's past medical records as to the properties of his implantable device.  However, it does appear to be a pacemaker based upon the appearance of his admission EKG.  He continues to have mild, episodic bradycardia, as low as 44/min within the last 24 hours.  I am not sure why bradycardia is occurring with the pacer in place.  -Consider outpatient pacemaker interrogation.     Pulmonary nodule  A 9 mm RLL nodule was seen incidentally on CT chest.  -Advise follow-up CT approximately 1/16/2022 (3 months).    Rosacea  Continue home doxycycline daily.       DVT Prophylaxis: Enoxaparin 40 mg subcutaneous Q24H.  Rose Catheter: Placed 10/19/2021 due to urinary retention and abdominal discomfort.  Central Lines: None  Code Status: Full Code       Diet  Orders Placed This Encounter      Combination Diet Consistent Carb 75 Grams CHO per Meal Diet;  "Easy to Chew (level 7); Thin Liquids (level 0)        Discussion  His respiratory symptoms are now mild, and oxygen needs continue to improve.  If his saturations are stable on an oxygen flow of 4 or 5 L/min, he can be discharged.  His bradycardia is better off of metoprolol.  His hypertension currently requires no therapy.  I anticipate that he will go home within the next 1 to 2 days.          Interval History:   \"Good\".  He has no rest dyspnea, and exertional dyspnea is mild.  He continues to have the sensation on occasion that he cannot take a complete inspiration.  He has almost no cough, and has no sputum production.  He has had no further thoracic pain.  His appetite is fair.  Denies dysphagia, nausea, or vomiting.  Denies musculoskeletal pain.           Review of Systems:    ROS: 10 point ROS neg other than the symptoms noted above in the HPI.           Medications:   Current active medications and PTA medications reviewed, see medication list for details.            Physical Exam:   Vitals were reviewed  Patient Vitals for the past 24 hrs:   BP Temp Temp src Pulse Resp SpO2   10/23/21 1500 94/61 97.7  F (36.5  C) Oral 83 16 96 %   10/23/21 1301 -- -- -- -- -- 93 %   10/23/21 1258 -- -- -- -- -- 90 %   10/23/21 1057 90/62 98.3  F (36.8  C) Oral 86 16 94 %   10/23/21 1042 -- -- -- -- -- 95 %   10/23/21 0950 -- -- -- -- -- 95 %   10/23/21 0830 -- -- -- -- -- 91 %   10/23/21 0730 109/63 97.7  F (36.5  C) Oral 67 16 94 %   10/22/21 2300 -- -- -- 58 -- --   10/22/21 2238 113/46 97.4  F (36.3  C) Oral (!) 39 18 97 %   10/22/21 1754 99/42 97.7  F (36.5  C) Oral 62 20 95 %       Temperatures:  Current - Temp: 97.9  F (36.6  C); Max - Temp  Av.9  F (36.6  C)  Min: 97.4  F (36.3  C)  Max: 98.4  F (36.9  C)  Respiration range: Resp  Av  Min: 15  Max: 28  Pulse range: Pulse  Av.3  Min: 44  Max: 93  Blood pressure range: Systolic (24hrs), Av , Min:86 , Max:132   ; Diastolic (24hrs), Av, Min:65, " Max:108    Pulse oximetry range: SpO2  Av.7 %  Min: 75 %  Max: 98 %  I/O last 3 completed shifts:  In: 1230 [P.O.:1230]  Out: 1150 [Urine:1150]      Intake/Output Summary (Last 24 hours) at 10/20/2021 0811  Last data filed at 10/20/2021 0200  Gross per 24 hour   Intake 370 ml   Output 1050 ml   Net -680 ml     GENERAL: Pleasant man, seated in a recliner, appears comfortable.  EYES: Eyes grossly normal to inspection, extraocular movements intact  HENT: Exam unavailable due to the presence of an oxygen mask.  NECK: Trachea midline, no stridor.    RESP: No accessory muscle use.  Symmetrical breath sounds.  Lungs clear throughout on inspiration.  Expiration sounds mildly to moderately prolonged, no wheeze.  CV: Regular rate and rhythm, non-tachycardic.  Normal S1 S2, no murmur or extra sound.  No lower extremity edema.  ABDOMEN: Soft, non-tender, no guarding.  Liver and spleen not enlarged, no masses palpable.  Bowel sounds positive.  MS: No bony deformities noted.  No red or inflamed joints.  SKIN: Warm and dry, no rashes.  NEURO: Alert, oriented, conversant.  Cranial nerves III - XII grossly intact aside from being hard of hearing.  No gross motor or sensory deficits.    : Rose catheter in place.  PSYCH: Calm, alert, conversant.  Able to articulate logical thoughts, no tangential thoughts, no hallucinations or delusions.  Affect better today, normal.                Data:     Results for orders placed or performed during the hospital encounter of 10/08/21 (from the past 24 hour(s))   Glucose by meter   Result Value Ref Range    GLUCOSE BY METER POCT 135 (H) 70 - 99 mg/dL   Glucose by meter   Result Value Ref Range    GLUCOSE BY METER POCT 186 (H) 70 - 99 mg/dL   Glucose by meter   Result Value Ref Range    GLUCOSE BY METER POCT 219 (H) 70 - 99 mg/dL   CBC with platelets   Result Value Ref Range    WBC Count 5.3 4.0 - 11.0 10e3/uL    RBC Count 4.84 4.40 - 5.90 10e6/uL    Hemoglobin 15.6 13.3 - 17.7 g/dL     Hematocrit 46.1 40.0 - 53.0 %    MCV 95 78 - 100 fL    MCH 32.2 26.5 - 33.0 pg    MCHC 33.8 31.5 - 36.5 g/dL    RDW 12.5 10.0 - 15.0 %    Platelet Count 174 150 - 450 10e3/uL   Procalcitonin   Result Value Ref Range    Procalcitonin 0.29 (H) <0.05 ng/mL   Basic metabolic panel   Result Value Ref Range    Sodium 133 133 - 144 mmol/L    Potassium 4.0 3.4 - 5.3 mmol/L    Chloride 103 94 - 109 mmol/L    Carbon Dioxide (CO2) 26 20 - 32 mmol/L    Anion Gap 4 3 - 14 mmol/L    Urea Nitrogen 32 (H) 7 - 30 mg/dL    Creatinine 1.12 0.66 - 1.25 mg/dL    Calcium 9.0 8.5 - 10.1 mg/dL    Glucose 161 (H) 70 - 99 mg/dL    GFR Estimate 63 >60 mL/min/1.73m2   Glucose by meter   Result Value Ref Range    GLUCOSE BY METER POCT 146 (H) 70 - 99 mg/dL   Glucose by meter   Result Value Ref Range    GLUCOSE BY METER POCT 166 (H) 70 - 99 mg/dL           Attestation:  I have reviewed today's vital signs, notes, medications, labs and imaging.  Amount of time spent in hospital follow-up: 25 minutes.     Sander Ware MD   [facility]

## 2021-10-24 ENCOUNTER — APPOINTMENT (OUTPATIENT)
Dept: PHYSICAL THERAPY | Facility: CLINIC | Age: 76
DRG: 177 | End: 2021-10-24
Payer: COMMERCIAL

## 2021-10-24 ENCOUNTER — APPOINTMENT (OUTPATIENT)
Dept: OCCUPATIONAL THERAPY | Facility: CLINIC | Age: 76
DRG: 177 | End: 2021-10-24
Payer: COMMERCIAL

## 2021-10-24 LAB
GLUCOSE BLDC GLUCOMTR-MCNC: 118 MG/DL (ref 70–99)
GLUCOSE BLDC GLUCOMTR-MCNC: 152 MG/DL (ref 70–99)
GLUCOSE BLDC GLUCOMTR-MCNC: 162 MG/DL (ref 70–99)
GLUCOSE BLDC GLUCOMTR-MCNC: 210 MG/DL (ref 70–99)

## 2021-10-24 PROCEDURE — 97161 PT EVAL LOW COMPLEX 20 MIN: CPT | Mod: GP | Performed by: PHYSICAL THERAPIST

## 2021-10-24 PROCEDURE — 250N000011 HC RX IP 250 OP 636

## 2021-10-24 PROCEDURE — 250N000013 HC RX MED GY IP 250 OP 250 PS 637: Performed by: PHYSICIAN ASSISTANT

## 2021-10-24 PROCEDURE — 97165 OT EVAL LOW COMPLEX 30 MIN: CPT | Mod: GO

## 2021-10-24 PROCEDURE — 250N000012 HC RX MED GY IP 250 OP 636 PS 637

## 2021-10-24 PROCEDURE — 250N000012 HC RX MED GY IP 250 OP 636 PS 637: Performed by: FAMILY MEDICINE

## 2021-10-24 PROCEDURE — 97110 THERAPEUTIC EXERCISES: CPT | Mod: GO

## 2021-10-24 PROCEDURE — 99232 SBSQ HOSP IP/OBS MODERATE 35: CPT

## 2021-10-24 PROCEDURE — 97110 THERAPEUTIC EXERCISES: CPT | Mod: GP | Performed by: PHYSICAL THERAPIST

## 2021-10-24 PROCEDURE — 250N000013 HC RX MED GY IP 250 OP 250 PS 637

## 2021-10-24 PROCEDURE — 120N000001 HC R&B MED SURG/OB

## 2021-10-24 PROCEDURE — 250N000013 HC RX MED GY IP 250 OP 250 PS 637: Performed by: FAMILY MEDICINE

## 2021-10-24 RX ORDER — GLIPIZIDE 5 MG/1
5 TABLET ORAL DAILY
Status: DISCONTINUED | OUTPATIENT
Start: 2021-10-24 | End: 2021-10-25 | Stop reason: HOSPADM

## 2021-10-24 RX ADMIN — LAMOTRIGINE 150 MG: 100 TABLET ORAL at 09:32

## 2021-10-24 RX ADMIN — ALBUTEROL SULFATE 2 PUFF: 90 AEROSOL, METERED RESPIRATORY (INHALATION) at 12:28

## 2021-10-24 RX ADMIN — ALBUTEROL SULFATE 2 PUFF: 90 AEROSOL, METERED RESPIRATORY (INHALATION) at 17:12

## 2021-10-24 RX ADMIN — LORAZEPAM 1 MG: 1 TABLET ORAL at 22:06

## 2021-10-24 RX ADMIN — PAROXETINE HYDROCHLORIDE 40 MG: 20 TABLET, FILM COATED ORAL at 22:06

## 2021-10-24 RX ADMIN — ALBUTEROL SULFATE 2 PUFF: 90 AEROSOL, METERED RESPIRATORY (INHALATION) at 09:30

## 2021-10-24 RX ADMIN — SENNOSIDES AND DOCUSATE SODIUM 1 TABLET: 50; 8.6 TABLET ORAL at 20:47

## 2021-10-24 RX ADMIN — ALBUTEROL SULFATE 2 PUFF: 90 AEROSOL, METERED RESPIRATORY (INHALATION) at 22:06

## 2021-10-24 RX ADMIN — BRIMONIDINE TARTRATE 1 DROP: 2 SOLUTION OPHTHALMIC at 09:31

## 2021-10-24 RX ADMIN — METFORMIN HYDROCHLORIDE 500 MG: 500 TABLET ORAL at 17:12

## 2021-10-24 RX ADMIN — INSULIN ASPART 1 UNITS: 100 INJECTION, SOLUTION INTRAVENOUS; SUBCUTANEOUS at 17:13

## 2021-10-24 RX ADMIN — LAMOTRIGINE 150 MG: 100 TABLET ORAL at 20:47

## 2021-10-24 RX ADMIN — SIMVASTATIN 20 MG: 20 TABLET, FILM COATED ORAL at 22:06

## 2021-10-24 RX ADMIN — INSULIN GLARGINE 15 UNITS: 100 INJECTION, SOLUTION SUBCUTANEOUS at 09:31

## 2021-10-24 RX ADMIN — GLIPIZIDE 5 MG: 5 TABLET ORAL at 17:12

## 2021-10-24 RX ADMIN — ENOXAPARIN SODIUM 40 MG: 100 INJECTION SUBCUTANEOUS at 09:31

## 2021-10-24 RX ADMIN — INSULIN ASPART 2 UNITS: 100 INJECTION, SOLUTION INTRAVENOUS; SUBCUTANEOUS at 12:44

## 2021-10-24 RX ADMIN — DOXYCYCLINE HYCLATE 50 MG: 50 CAPSULE ORAL at 09:31

## 2021-10-24 RX ADMIN — BRIMONIDINE TARTRATE 1 DROP: 2 SOLUTION OPHTHALMIC at 20:49

## 2021-10-24 RX ADMIN — SALINE NASAL SPRAY 1 SPRAY: 1.5 SOLUTION NASAL at 19:00

## 2021-10-24 RX ADMIN — INSULIN ASPART 1 UNITS: 100 INJECTION, SOLUTION INTRAVENOUS; SUBCUTANEOUS at 09:32

## 2021-10-24 RX ADMIN — UMECLIDINIUM 1 PUFF: 62.5 AEROSOL, POWDER ORAL at 09:31

## 2021-10-24 ASSESSMENT — ACTIVITIES OF DAILY LIVING (ADL)
ADLS_ACUITY_SCORE: 8
ADLS_ACUITY_SCORE: 11
ADLS_ACUITY_SCORE: 8
PREVIOUS_RESPONSIBILITIES: MEAL PREP;HOUSEKEEPING;LAUNDRY;SHOPPING;YARDWORK;MEDICATION MANAGEMENT;FINANCES;DRIVING
ADLS_ACUITY_SCORE: 8
ADLS_ACUITY_SCORE: 8
ADLS_ACUITY_SCORE: 11
ADLS_ACUITY_SCORE: 8
ADLS_ACUITY_SCORE: 11
ADLS_ACUITY_SCORE: 11
ADLS_ACUITY_SCORE: 9
ADLS_ACUITY_SCORE: 8
ADLS_ACUITY_SCORE: 9
ADLS_ACUITY_SCORE: 8
ADLS_ACUITY_SCORE: 8
ADLS_ACUITY_SCORE: 11
ADLS_ACUITY_SCORE: 9
ADLS_ACUITY_SCORE: 8

## 2021-10-24 ASSESSMENT — MIFFLIN-ST. JEOR: SCORE: 1553.12

## 2021-10-24 NOTE — PLAN OF CARE
Patient alert and oriented.  Uses call light appropriately.  Makes needs known.  Can be forgetful at times.  Sleeping well this shift between cares.  Denies pain.  Lung sounds diminished.  Oxygen 15 l/nasal canula with sats at 96%.  Patient has patent currie with clear yellow urine output.  Patient [place].  Bed alarm activated for patient safety.  Blood glucose at 0200 = 140.

## 2021-10-24 NOTE — PROGRESS NOTES
[facility]           Assessment & Plan        Remington Gutierrez is a 75 year old male admitted on 10/8/2021. [facility] with history of COPD, systolic heart failure, S/P AICD, type 2 diabetes, and anxiety/depression. He presented with increasing shortness of breath for 4 days, especially with activity and lying down, nonproductive cough, and was found to be COVID positive.     # Acute Hypoxic Respiratory Failure secondary to COVID-19 infection  # Viral Pneumonia secondary to COVID-19 infection     Symptom Onset 10/4/2021   Date of 1st Positive Test 10/8/2021   Vaccination Status Partially Vaccinated - 1st dose < 1 week prior to onset of symptoms    End-isolation date 10/24/2021      Initial chest x-ray demonstrated bilateral interstitial and groundglass opacities consistent with pneumonia.  He initially required oxygen at 5 L/min per nasal cannula, though he came more hypoxemic 10/9/2021, requiring the use of HFNC oxygen.  On 10/22/2021, the patient was changed from an HFNC to a simple mask.  He is still receiving 12 L/min per simple mask.  Saturation has been 97 to 99% during my visit.  I spoke to his RN, who has been leaving the oxygen at relatively high flows because of exertional desaturation.  She says he recovers fairly quickly however.  I turned the oxygen flow down to 5 L/min during my visit, and at rest, so no saturation lower than 93%.  He has no more than mild rest dyspnea, exertional dyspnea, and cough.  He has no additional thoracic pain.  He remains afebrile.        - Oxygen: We are not going to stuart these transient exertional desaturations.  I advised his RN that I turned him down to 5 L/min; we will see how he does there.  Perhaps we can change to a nasal cannula later in the day in anticipation of probable discharge tomorrow.  - Labs:   .0 --> 100.0 --> 50.5 --> 34.3 --> 16.2 --> 4.4 --> 3.0 --> 2.9 --> 2.9 --> 2.9 --> 8.6.   --> 714  --> 637 --> 660  fibrinogen 633 --> 677 --> 668 --> 650 --> 556  d-Dimer 1.43 --> 1.17 --> 0.67 --> 0.64 --> 0.61 --> 0.85 --> 0.74  Given clinical improvement in overall trend toward recovery and inflammatory markers.  Daily monitoring was discontinued 10/21/2021.  - Imaging: Most recent CXR was performed 10/21/2021, demonstrates an increase in diffuse bilateral infiltrates by comparison to a film of 10/16/2021, particularly on the right side.  - Breathing treatments:  Continue albuterol HFA and home tiotropium inhalers as below; avoid nebulizers in favor of MDIs.  - IV fluids: None currently indicated.  - Antibiotics:  Not indicated.  - COVID-Focused Medications:   Dexamethasone 6 mg daily given 10/8 - 10/17/2021.  Remdesivir was eventually started 10/10/2021 once renal function improved, 5 day course completed 10/14/2021.  Tocilizumab 600 mg IV given 10/10/2021 after discussion with Dr. Tapia, infectious disease.    - DVT Prophylaxis:   On admission started heparin 5000 units every 8 hours due to TANIA, which was then changed to a low intensity heparin infusion when hypoxemia worsened.  Kidney injury subsequently improved, and the patient was clinically stable in terms of O2 needs, so heparin was discontinued and enoxaparin 40 mg Q24H continues.  Consider anticoag on discharge for 30 days & until return to normal mobility.    Volume overload pulmonary edema adding to hypoxemia  Oxygen needs began to increase 10/16/2021.  Chest x-ray 10/16/2021 demonstrated continued bilateral interstitial and groundglass opacities consistent with COVID-19 pneumonia, along with new areas of airspace opacity in the lateral right lower lobe.  CT chest 10/16/2021 demonstrated moderate diffuse opacities consistent with COVID-19 pneumonia; no new infiltrates were described.  Furosemide 20 mg IV twice daily was resumed 10/16/2021, and continued through 10/18/2021, at which point it had to be stopped due to decreasing GFR.  Furosemide  was briefly resumed, then stopped again 10/21/2021 due to mild hypotension.  -Acute pulmonary edema is resolved.  Furosemide remains on hold due to mild hypotension.  Will resume it when BP stabilizes.     Acute Kidney Injury  Chronic kidney disease stage 2  Baseline GFR appears to be 60-65.  Admission GFR 29, likely representing acute kidney injury due to volume depletion from acute illness.  He received a 500 mL IV fluid bolus in ED, and preadmission lisinopril, furosemide, and Mobic were held.  Furosemide was resumed 10/12/2021. There was a gradual improvement in GFR: 29 --> 33 --> 38 --> 49 --> 60 -->64 as of 10/15/2021.  GFR again declined in response to IV furosemide diuresis started 10/16/2021.  Furosemide was held after the 10/18/2021 doses.  GFR continues to be variable: 49 --> 55 --> 63 --> 53 --> 63 today.  Preadmission furosemide 20 mg twice daily has been on hold since 10/21/2021 due to mild hypotension.  -Continue to follow renal function intermittently.  -Preadmission furosemide is on hold due to mild hypotension.  -Preadmission lisinopril remains on hold.  -Continue to hold Mobic until necessary for symptom control.     Acute encephalopathy due to COVID-19 and corticosteroids  Acute toxic encephalopathy due to zolpidem 10/12 - 10/13/2021  Possible mild cognitive impairment/dementia  Noted to have some cognitive impairment on chart review, but not quantified, and no specific diagnosis has been made.  He was more acutely confused in the ER, pulling out IV's x 2 and removing his oxygen.  However, at time of admission H+P he was improved and appeared at cognitive baseline.  There had been no additional confusion or agitation until the early morning hours of 10/13/2021, at which time the patient was noted to be confused, agitated, and taking out his HFNC cannula.  He had gotten zolpidem 6.25 mg p.o. at bedtime, not a new medication for him, though I thought maybe the combination of zolpidem and  dexamethasone was enough to cause the morning episode.  Zolpidem was stopped.  Ramelteon 8 mg p.o. at at bedtime was tried, though the patient slept poorly, and did not wish to continue it.  Lorazepam 1 mg p.o. nightly was started 10/14/2021.  The patient has tolerated prazepam well.  We have seen no confusion and delirium, or agitation.  Today, he is awake, alert, appropriate in conversation.  -Zolpidem is discontinued.  -Ramelteon was ineffective (though only 1 dose given) and is discontinued.  -Continue lorazepam 1 mg p.o. nightly.     Chronic Systolic Congestive Heart Failure   Dilated Cardiomyopathy  Noted to have history of systolic CHF and dilated cardiomyopathy. Takes furosemide 20 mg twice daily PRN at baseline.  [facility]. Non-ishemic cardiomyopathy. ECHO 4/2021 - EF 25-30%. Severe LV dilation. Global hypokinesis. RVSF mildly reduced. Cardiac MRI 2/2021- c/w Non-ishemic cardiomyopathy. Ascending aorta 4.1 cm. Admitted with acute CHF [facility] 9/7-12/2021. Dry weight 202# per [facility] discharge summary 9/2021     On admission BNP elevated 3,907 (similar to 7/2021 ED visit for CHF/COPD). CXR on admission consistent with COVID pneumonia but not CHF.  Echocardiogram performed 10/11/2021 demonstrated a moderately dilated left ventricle, with severely reduced left ventricular systolic function, ejection fraction 30 to 35%.  Global hypokinesis was seen.  The patient required 2 days of IV diuresis for acute pulmonary edema, details above.  He appeared to be well compensated after IV furosemide, so preadmission furosemide 20 mg p.o. twice daily was resumed.  We have had to hold furosemide since 10/21/2021 due to mild hypotension.  There is no evidence of decompensation on exam today.  -Lisinopril remains on hold.  Given improvement in renal function to baseline, it could be resumed if necessary.  -Continue to hold preadmission furosemide 20 mg p.o. twice daily until BP stabilizes.  -Follow daily weights and I/Os.         Abdominal discomfort due to constipation and urinary retention  Patient reported some intermittent abdominal cramping 10/19/2021.  Abdominal exam was unremarkable at that time.  He was found to have some urinary retention, though abdominal discomfort persisted even after Rose catheter placement.  CT abdomen 10/19/2021 demonstrated a moderate amount of stool in the colon.  He was treated with a Dulcolax suppository and a fleets enema, though had no response.  An additional enema was given, with moderate BM in response.  Abdominal discomfort resolved, and has not recurred.  -Resolved.    -Continue laxatives and stimulants as needed.      Essential hypertension   Managed prior to admission with lisinopril 2.5 mg daily, metoprolol XL 25 mg daily and furosemide 20 mg p.o. twice daily.  Metoprolol had to be held 10/12/2021 due to bradycardia while also on remdesivir, then was resumed 10/16/2021 when remdesivir was complete.  Furosemide was resumed at preadmission dose.  Lisinopril has remained on hold since admission.  Blood pressure was running low as of 10/21/2021 morning, so furosemide was subsequently put on hold.  Blood pressure continues to be variable, as low as 9 systolic and as high as 113 systolic over this shift.   -We will hold metoprolol until blood pressure recovers.  -Furosemide remains on hold until BP improves.  -Lisinopril has been on hold since admission, and will remain on hold.     Bradycardia  The patient had heart rates intermittently as low as 38/min.  Bradycardia was probably a result of combined remdesivir and metoprolol therapy.  Because I wanted him to complete 5 days of remdesivir, I put a temporary hold on metoprolol.  Bradycardia improved once remdesivir was complete, and metoprolol was resumed.  Note that metoprolol was put on hold 10/21/2021 due to mild hypotension, described above.  Bradycardia has improved, though still occurs intermittently during sleep, probably due to the  patient's underlying BENNY not currently being treated with CPAP.  -Continue to observe heart rate while metoprolol is on hold.  It may not be possible to resume beta-blocker therapy.  -Resume CPAP use upon discharge, see below.    COPD    Noted on chart review. PFTs unavailable. Not on home oxygen.  Exam suggests mild to moderate expiratory phase prolongation, without wheeze.  -Continued preadmission albuterol scheduled 4 times daily and every 4 hours as needed.  -Continue preadmission tiotropium.     Elevated troponin - suspect strain (Type 2 MI) due to COVID infection  Initial troponin 0.107. ECG showed paced rhythm, limiting interpretation. Asymptomatic; patient reported no chest pain prior to admission or since admission.  Suspect this elevation represented strain related to COVID pneumonia and hypoxemic failure.  Next troponins were normal at 0.023 --> 0.019.  -No additional troponin monitoring planned.     Diabetes Mellitus, Type 2  Managed prior to admission with empagliflozin 12.5 mg daily, glipizide 5 mg daily, and metformin 500 mg twice daily.  These outpatient medications have been held since admission. Hemoglobin A1c 7.2 on 10/5/2021.  The patient became hyperglycemic after dexamethasone was added, so insulin glargine, prandial NovoLog, and NovoLog medium resistance sliding scale were added and titrated up.  Dexamethasone is now complete, and hyperglycemia is improving.  He is currently receiving glargine 15 units subcu every morning and a NovoLog medium insulin resistance sliding scale.  Glucose control has been generally good, 152 - 210 over the past 5 checks.  In anticipation of discharge, perhaps tomorrow, I would like to resume his oral medications.  -We will resume empagliflozin, glipizide, and metformin at preadmission doses.  -Discontinue glargine 15 units subcu every morning.  -Continue NovoLog medium resistance sliding scale.     BENNY  Mild, but severe supine by sleep study 2008. Reports using  CPAP at home, not currently available for use here.  We are seeing some bradycardia during sleep off of CPAP here, probably due to untreated BENNY.  -Resume home CPAP after discharge.     Anxiety and Depression  Insomnia  Previously diagnosed, and managed prior to admission with lamotrigine 150 mg twice daily and paroxetine 40 mg p.o. nightly, both continued here.  Mood and affect have been stable this hospital stay.    -Continue preadmission lamotrigine and paroxetine.  -Out of concern that zolpidem was causing morning agitated delirium, I stopped zolpidem on 10/13/2021.  He did markedly better on lorazepam 1 mg p.o. nightly, with encephalopathy resolved as above.  I would not anticipate that he will require lorazepam as an outpatient medication after discharge.     Sinus node dysfunction   S/P AICD 5/4/2021  Paced rhythm noted on admission ECG.  I cannot find details in the patient's past medical records as to the properties of his implantable device.  However, it does appear to be a pacemaker based upon the appearance of his admission EKG.  He continues to have mild, episodic bradycardia, as low as 44/min within the last 24 hours.  I am not sure why bradycardia is occurring with the pacer in place.  -Consider outpatient pacemaker interrogation.     Pulmonary nodule  A 9 mm RLL nodule was seen incidentally on CT chest.  -Advise follow-up CT approximately 1/16/2022 (3 months).    Rosacea  Continue home doxycycline daily.       DVT Prophylaxis: Enoxaparin 40 mg subcutaneous Q24H.  Rose Catheter: Placed 10/19/2021 due to urinary retention and abdominal discomfort.  Will attempt removal today.  Central Lines: None  Code Status: Full Code       Diet  Orders Placed This Encounter      Combination Diet Consistent Carb 75 Grams CHO per Meal Diet; Easy to Chew (level 7); Thin Liquids (level 0)        Discussion  At rest, 5 L/min oxygen appears sufficient to maintain adequate saturation.  Although he desaturates with  "exertion, he is only mildly symptomatic when it occurs, and he recovers quickly.  Therefore, I do not think we need to stuart these desaturations with higher oxygen flows.  I have left him on a flow of 5 L/min: We will see how he does on that flow for the rest of the day.  I anticipate that he will discharge tomorrow, probably on supplemental oxygen.          Interval History:   \"Bored to tears\".  He no longer has any bothersome respiratory symptoms.  He still feels globally weak, and has poor endurance, though thinks that those are improving.  I asked if he would be able to manage this weakness and lack of endurance at home after discharge; he says that he has no doubt that he can.  He also says that his son lives next door, and that her niece will be staying with him for a while, so he is not worried about himself functioning well at home.           Review of Systems:    ROS: 10 point ROS neg other than the symptoms noted above in the HPI.           Medications:   Current active medications and PTA medications reviewed, see medication list for details.            Physical Exam:   Vitals were reviewed  Patient Vitals for the past 24 hrs:   BP Temp Temp src Pulse Resp SpO2 Weight   10/24/21 1116 106/49 (!) 96.4  F (35.8  C) Axillary 79 16 96 % --   10/24/21 1112 -- -- -- -- -- 96 % --   10/24/21 0930 -- -- -- -- -- 93 % --   10/24/21 0706 106/51 98.9  F (37.2  C) Oral 62 18 97 % 85 kg (187 lb 6.3 oz)   10/24/21 0408 94/54 97.4  F (36.3  C) Oral (!) 44 18 99 % --   10/24/21 0145 -- -- -- -- -- 96 % --   10/23/21 2300 -- -- -- -- -- 97 % --   10/23/21 2206 120/59 97.8  F (36.6  C) Oral 61 19 99 % --   10/23/21 2008 99/55 98.2  F (36.8  C) Oral 53 18 99 % --   10/23/21 1500 94/61 97.7  F (36.5  C) Oral 83 16 96 % --       Temperatures:  Current - Temp: 97.9  F (36.6  C); Max - Temp  Av.9  F (36.6  C)  Min: 97.4  F (36.3  C)  Max: 98.4  F (36.9  C)  Respiration range: Resp  Av  Min: 15  Max: 28  Pulse range: " Pulse  Av.3  Min: 44  Max: 93  Blood pressure range: Systolic (24hrs), Av , Min:86 , Max:132   ; Diastolic (24hrs), Av, Min:65, Max:108    Pulse oximetry range: SpO2  Av.7 %  Min: 75 %  Max: 98 %  I/O last 3 completed shifts:  In: 990 [P.O.:990]  Out: 300 [Urine:300]      Intake/Output Summary (Last 24 hours) at 10/20/2021 0811  Last data filed at 10/20/2021 0200  Gross per 24 hour   Intake 370 ml   Output 1050 ml   Net -680 ml     GENERAL: Pleasant man, lying propped up in bed, appears comfortable.  EYES: Eyes grossly normal to inspection, extraocular movements intact  HENT: Exam unavailable due to the presence of an oxygen mask.  NECK: Trachea midline, no stridor.    RESP: No accessory muscle use.  Symmetrical breath sounds.  Lungs clear throughout on inspiration.  Expiration sounds mildly to moderately prolonged, no wheeze.  CV: Regular rate and rhythm, non-tachycardic.  Normal S1 S2, no murmur or extra sound.  No lower extremity edema.  ABDOMEN: Soft, non-tender, no guarding.  Liver and spleen not enlarged, no masses palpable.  Bowel sounds positive.  MS: No bony deformities noted.  No red or inflamed joints.  SKIN: Warm and dry, no rashes.  NEURO: Alert, oriented, conversant.  Cranial nerves III - XII grossly intact aside from being very hard of hearing.  No gross motor or sensory deficits.    : Rose catheter in place.  PSYCH: Calm, alert, conversant.  Able to articulate logical thoughts, no tangential thoughts, no hallucinations or delusions.  Affect normal.                Data:     Results for orders placed or performed during the hospital encounter of 10/08/21 (from the past 24 hour(s))   Glucose by meter   Result Value Ref Range    GLUCOSE BY METER POCT 171 (H) 70 - 99 mg/dL   Glucose by meter   Result Value Ref Range    GLUCOSE BY METER POCT 191 (H) 70 - 99 mg/dL   Glucose by meter   Result Value Ref Range    GLUCOSE BY METER POCT 152 (H) 70 - 99 mg/dL   Glucose by meter   Result  Value Ref Range    GLUCOSE BY METER POCT 210 (H) 70 - 99 mg/dL           Attestation:  I have reviewed today's vital signs, notes, medications, labs and imaging.  Amount of time spent in hospital follow-up: 25 minutes.     Sander Ware MD   [facility]

## 2021-10-24 NOTE — PROGRESS NOTES
10/24/21 1100   Quick Adds   Type of Visit Initial Occupational Therapy Evaluation       Present no   Living Environment   People in home child(cruzito), adult   Current Living Arrangements house   Home Accessibility no concerns   Living Environment Comments Pt lives on his adult son's property - in his own home.    Self-Care   Usual Activity Tolerance good   Current Activity Tolerance fair   Regular Exercise No   Equipment Currently Used at Home none   Activity/Exercise/Self-Care Comment Prev ind ADLs/IADLs, mobility, drives occasionally    Instrumental Activities of Daily Living (IADL)   Previous Responsibilities meal prep;housekeeping;laundry;shopping;yardwork;medication management;finances;driving   Disability/Function   Hearing Difficulty or Deaf yes   Patient's preferred means of communication other  (hearing aids )   Describe hearing loss bilateral hearing loss   Use of hearing assistive devices right hearing aid;left hearing aid   Were auxiliary aids offered? no   Wear Glasses or Blind no   Concentrating, Remembering or Making Decisions Difficulty no   Difficulty Communicating no   Difficulty Eating/Swallowing no   Walking or Climbing Stairs Difficulty no   Dressing/Bathing Difficulty no   Toileting issues no   Doing Errands Independently Difficulty (such as shopping) no   Fall history within last six months no   Change in Functional Status Since Onset of Current Illness/Injury yes   General Information   Onset of Illness/Injury or Date of Surgery 10/08/21   Referring Physician Dr. Ware    Patient/Family Therapy Goal Statement (OT) to go home    Additional Occupational Profile Info/Pertinent History of Current Problem Remington Gutierrez is a 75 year old male admitted on 10/8/2021. [facility] with history of COPD, systolic heart failure, S/P AICD, type 2 diabetes, and anxiety/depression. He presented with increasing shortness of breath for 4 days, especially with activity and  lying down, nonproductive cough, and was found to be COVID positive.   Existing Precautions/Restrictions oxygen therapy device and L/min;fall   Limitations/Impairments hearing   General Observations and Info Pleasant and cooperative    Cognitive Status Examination   Orientation Status orientation to person, place and time   Affect/Mental Status (Cognitive) WNL   Follows Commands WNL   Pain Assessment   Patient Currently in Pain Yes, see Vital Sign flowsheet   Integumentary/Edema   Integumentary/Edema Comments Reports wound on his bottom   Range of Motion Comprehensive   General Range of Motion no range of motion deficits identified   Strength Comprehensive (MMT)   Comment, General Manual Muscle Testing (MMT) Assessment generalized weakness noted    Coordination   Upper Extremity Coordination No deficits were identified   Bed Mobility   Bed Mobility sit-supine   Sit-Supine [place] (Bed Mobility) supervision   Assistive Device (Bed Mobility) bed rails   Clinical Impression   Criteria for Skilled Therapeutic Interventions Met (OT) yes;skilled treatment is necessary   OT Diagnosis decreased ind with ADLs/IADLs, decreased functional endurance    OT Problem List-Impairments impacting ADL problems related to;activity tolerance impaired;strength   Assessment of Occupational Performance 1-3 Performance Deficits   Identified Performance Deficits activity tolerance for ADLs/IADLs and related mobility    Planned Therapy Interventions (OT) ADL retraining;strengthening;home program guidelines;progressive activity/exercise   Clinical Decision Making Complexity (OT) low complexity   Therapy Frequency (OT) Daily   Predicted Duration of Therapy 2-3 days    Anticipated Equipment Needs Upon Discharge (OT) shower chair   Risk & Benefits of therapy have been explained evaluation/treatment results reviewed;care plan/treatment goals reviewed;risks/benefits reviewed;current/potential barriers reviewed;participants voiced agreement with  care plan;participants included;patient   Comment-Clinical Impression Pt will benefit from ongoing OT to maximize safety and ind with ADLs/mobility and improve functional strength and endurance.    OT Discharge Planning    OT Discharge Recommendation (DC Rec) home with home care occupational therapy;Transitional Care Facility   OT Rationale for DC Rec Pt is below baseline, has limited tolerance for activity, desaturates quickly, will require ongoing therapies to increase functional strength and endruance to return to baseline    OT Brief overview of current status  SBA mobility tasks, fatigues quickly and O2 drops to 86%, needs 1-2 mins to recover.    Total Evaluation Time (Minutes)   Total Evaluation Time (Minutes) 15

## 2021-10-24 NOTE — PROGRESS NOTES
10/24/21 1000   Quick Adds   Type of Visit Initial PT Evaluation   Living Environment   People in home child(cruzito), adult   Current Living Arrangements house   Home Accessibility no concerns   Living Environment Comments Pt lives at home with adult son or with adult son nearby   Self-Care   Usual Activity Tolerance good   Current Activity Tolerance fair   Regular Exercise No   Equipment Currently Used at Home none   Activity/Exercise/Self-Care Comment independent with all ADLs and mobility   Disability/Function   Fall history within last six months no   General Information   Onset of Illness/Injury or Date of Surgery 10/08/21   Referring Physician Sander Ware MD   Patient/Family Therapy Goals Statement (PT) return home   Pertinent History of Current Problem (include personal factors and/or comorbidities that impact the POC) Remington Gutierrez is a 75 year old male admitted on 10/8/2021. He is a [facility] with history of COPD, systolic heart failure, S/P AICD, type 2 diabetes, and anxiety/depression. He presented with increasing shortness of breath for 4 days, especially with activity and lying down, nonproductive cough, and was found to be COVID positive.   Existing Precautions/Restrictions oxygen therapy device and L/min   General Observations Pt alert and agreeable to PT   Cognition   Orientation Status (Cognition) oriented x 3   Affect/Mental Status (Cognition) WFL   Follows Commands (Cognition) WFL   Pain Assessment   Patient Currently in Pain No   Integumentary/Edema   Integumentary/Edema no deficits were identifed   Posture    Posture Not impaired   Range of Motion (ROM)   ROM Quick Adds ROM WFL   Strength   Strength Comments BL LE strength grossly 4/5   Transfers   Transfers sit-stand transfer   Impairments Contributing to Impaired Transfers impaired balance;decreased ROM;decreased strength   Transfer Safety Comments gait belt and FWW   Sit-Stand Transfer   Sit-Stand [place] (Transfers)  modified independence;contact guard   Assistive Device (Sit-Stand Transfers) walker, 4-wheeled   Sit/Stand Transfer Comments gait belt and verbal cueing for safety   Balance   Balance no deficits were identified   Sensory Examination   Sensory Perception WFL   Coordination   Coordination no deficits were identified   Muscle Tone   Muscle Tone no deficits were identified   Clinical Impression   Criteria for Skilled Therapeutic Intervention yes, treatment indicated   PT Diagnosis (PT) deconditioning   Influenced by the following impairments weakness   Functional limitations due to impairments gait, transfers, balance, mobility   Clinical Presentation Stable/Uncomplicated   Clinical Presentation Rationale clinical reasoning   Clinical Decision Making (Complexity) low complexity   Therapy Frequency (PT) Daily   Predicted Duration of Therapy Intervention (days/wks) 3   Planned Therapy Interventions (PT) balance training;bed mobility training;gait training;home exercise program;patient/family education;ROM (range of motion);stair training;strengthening;transfer training;progressive activity/exercise;risk factor education;home program guidelines   Anticipated Equipment Needs at Discharge (PT) walker, rolling   Risk & Benefits of therapy have been explained evaluation/treatment results reviewed;care plan/treatment goals reviewed;risks/benefits reviewed;current/potential barriers reviewed;participants voiced agreement with care plan;participants included;patient   Clinical Impression Comments Pt alert and agreeable to PT, exhibits significant deconditioning. Indpendent at baseline, low activity tolerance will benefit from PT to return to PLOF.    PT Discharge Planning    PT Discharge Recommendation (DC Rec) home with home care physical therapy;Transitional Care Facility   PT Rationale for DC Rec Pt requires assist for transfers and gait    PT Brief overview of current status  Pt fatigues quickly with short interval of  standing exercises   Total Evaluation Time   Total Evaluation Time (Minutes) 8

## 2021-10-24 NOTE — PLAN OF CARE
Patient awake and alert during shift.  Patient up in chair of meal.  Patient complained of pain on his coccyx area when sitting in chair - chair cushion brought in - patient stated it was much more comfortably - mepliex in place.  Vitals stable - still has some bradycardic episodes noted on pulse oximetry - but when listening to apical pulse his HR is 59.  Continues on oxygen at 15 LPM via oxymask - tolerating well - at rest - will still drop when oxygen decreased and is up moving around.  BG stable - HS BG was 191 - snack given.  Patient resting comfortably at HS.  Will continue to monitor  Maura Singletary RN 12:20 AM 10/24/2021

## 2021-10-24 NOTE — PLAN OF CARE
"Pt weaned to 5L Oxymask to maintain SPO2 >88%. Patient intermittently bradycardic (HR 30s-50s). Otherwise vitally stable;    /66 (BP Location: Right arm)   Pulse 80   Temp 97  F (36.1  C) (Axillary)   Resp 16   Ht 1.733 m (5' 8.23\")   Wt 85 kg (187 lb 6.3 oz)   SpO2 93%   BMI 28.30 kg/m      No pain or nausea reported. COVID isolation was discontinued today. X1/GB/walker for transfers. Rose patent and draining well. Pt intermittently SOB with movement, patient doesn't notice the increased depth of breathing.     Plan: O2 management.    Naomie Neves RN on 10/24/2021 at 2:42 PM      Problem: Adult Inpatient Plan of Care  Goal: Plan of Care Review  Outcome: Improving  Goal: Patient-Specific Goal (Individualized)  Outcome: Improving  Goal: Absence of Hospital-Acquired Illness or Injury  Outcome: Improving  Intervention: Identify and Manage Fall Risk  Recent Flowsheet Documentation  Taken 10/24/2021 0930 by Naomie Neves, RN  Safety Promotion/Fall Prevention: activity supervised  Goal: Optimal Comfort and Wellbeing  Outcome: Improving  Goal: Readiness for Transition of Care  Outcome: Improving     Problem: Risk for Delirium  Goal: Optimal Coping  Outcome: Improving  Goal: Improved Behavioral Control  Outcome: Improving  Goal: Improved Attention and Thought Clarity  Outcome: Improving  Goal: Improved Sleep  Outcome: Improving     Problem: Discharge Planning  Goal: Discharge Planning (Adult, OB, Behavioral, Peds)  Outcome: Improving     Problem: Gas Exchange Impaired  Goal: Optimal Gas Exchange  Outcome: Improving     Problem: COPD Comorbidity  Goal: Maintenance of COPD Symptom Control  Outcome: Improving     Problem: Diabetes Comorbidity  Goal: Blood Glucose Level Within Targeted Range  Outcome: Improving     Problem: Heart Failure Comorbidity  Goal: Maintenance of Heart Failure Symptom Control  Outcome: Improving     Problem: Hypertension Comorbidity  Goal: Blood Pressure in Desired Range  Outcome: " Improving     Problem: OT General Care Plan  Goal: Toilet Transfer/Toileting (OT)  Description: Toilet Transfer/Toileting (OT)  Outcome: Improving

## 2021-10-24 NOTE — PROGRESS NOTES
Care Management Follow Up    Length of Stay (days): 16    Expected Discharge Date: 10/25/2021     Concerns to be Addressed: discharge planning     Patient plan of care discussed at interdisciplinary rounds: Yes    Anticipated Discharge Disposition: Home, Home Care     Anticipated Discharge Services: None  Anticipated Discharge DME: Oxygen    Patient/family educated on Medicare website which has current facility and service quality ratings: yes  Education Provided on the Discharge Plan:    Patient/Family in Agreement with the Plan: yes    Referrals Placed by CM/SW: Homecare  Private pay costs discussed: Not applicable    Additional Information:    Spoke with the Pt's son, Tab regarding discharge planning as the Pt did not answer the phone.    The Pt will likely discharge in 1-2 days.  He is currently open with [facility] (Phone: [phone] Fax: [phone]), RN for medication management.  Spoke to Marilyn RN, [facility]  (weekend on call RN) regarding adding PT and a HHA.  Marilyn will check with Intake on Monday.  The Pt[facility].    Per Tab, a niece will be staying with the Pt upon discharge.    Plan:  Home with [facility] and family support.      Marie Calle, RN

## 2021-10-25 ENCOUNTER — DOCUMENTATION ONLY (OUTPATIENT)
Dept: MEDSURG UNIT | Facility: CLINIC | Age: 76
End: 2021-10-25

## 2021-10-25 VITALS
OXYGEN SATURATION: 92 % | TEMPERATURE: 97.9 F | BODY MASS INDEX: 28.57 KG/M2 | HEART RATE: 57 BPM | HEIGHT: 68 IN | RESPIRATION RATE: 18 BRPM | SYSTOLIC BLOOD PRESSURE: 108 MMHG | DIASTOLIC BLOOD PRESSURE: 47 MMHG | WEIGHT: 188.49 LBS

## 2021-10-25 LAB
GLUCOSE BLDC GLUCOMTR-MCNC: 120 MG/DL (ref 70–99)
GLUCOSE BLDC GLUCOMTR-MCNC: 130 MG/DL (ref 70–99)

## 2021-10-25 PROCEDURE — 250N000013 HC RX MED GY IP 250 OP 250 PS 637

## 2021-10-25 PROCEDURE — 250N000011 HC RX IP 250 OP 636

## 2021-10-25 PROCEDURE — 99239 HOSP IP/OBS DSCHRG MGMT >30: CPT

## 2021-10-25 PROCEDURE — 250N000013 HC RX MED GY IP 250 OP 250 PS 637: Performed by: FAMILY MEDICINE

## 2021-10-25 RX ADMIN — LAMOTRIGINE 150 MG: 100 TABLET ORAL at 07:51

## 2021-10-25 RX ADMIN — EMPAGLIFLOZIN 10 MG: 10 TABLET, FILM COATED ORAL at 07:51

## 2021-10-25 RX ADMIN — ENOXAPARIN SODIUM 40 MG: 100 INJECTION SUBCUTANEOUS at 07:52

## 2021-10-25 RX ADMIN — GLIPIZIDE 5 MG: 5 TABLET ORAL at 07:51

## 2021-10-25 RX ADMIN — BRIMONIDINE TARTRATE 1 DROP: 2 SOLUTION OPHTHALMIC at 07:54

## 2021-10-25 RX ADMIN — UMECLIDINIUM 1 PUFF: 62.5 AEROSOL, POWDER ORAL at 07:57

## 2021-10-25 RX ADMIN — METFORMIN HYDROCHLORIDE 500 MG: 500 TABLET ORAL at 07:51

## 2021-10-25 RX ADMIN — DOXYCYCLINE HYCLATE 50 MG: 50 CAPSULE ORAL at 07:50

## 2021-10-25 RX ADMIN — ALBUTEROL SULFATE 2 PUFF: 90 AEROSOL, METERED RESPIRATORY (INHALATION) at 07:58

## 2021-10-25 ASSESSMENT — ACTIVITIES OF DAILY LIVING (ADL)
ADLS_ACUITY_SCORE: 8
ADLS_ACUITY_SCORE: 9
ADLS_ACUITY_SCORE: 8
ADLS_ACUITY_SCORE: 9
ADLS_ACUITY_SCORE: 8
ADLS_ACUITY_SCORE: 9
ADLS_ACUITY_SCORE: 8
ADLS_ACUITY_SCORE: 8

## 2021-10-25 ASSESSMENT — MIFFLIN-ST. JEOR: SCORE: 1558.12

## 2021-10-25 NOTE — PROGRESS NOTES
"Patient denied any SOB at rest. He continued with 5 L/min oxygen via nasal cannula. Large bowel movement x 2.     /43 (BP Location: Right arm)   Pulse (!) 47   Temp 97.9  F (36.6  C) (Oral)   Resp 18   Ht 1.733 m (5' 8.23\")   Wt 85.5 kg (188 lb 7.9 oz)   SpO2 93%   BMI 28.47 kg/m      "

## 2021-10-25 NOTE — PLAN OF CARE
Physical Therapy Discharge Summary    Reason for therapy discharge:    Discharged to home with home therapy.    Progress towards therapy goal(s). See goals on Care Plan in [facility] electronic health record for goal details.  Goals partially met.  Barriers to achieving goals:   discharge from facility.    Therapy recommendation(s):    Continued therapy is recommended.  Rationale/Recommendations:  Home PT to maximize strength and mobility.

## 2021-10-25 NOTE — PLAN OF CARE
Occupational Therapy Discharge Summary    Reason for therapy discharge:    Discharged to home with home therapy.    Progress towards therapy goal(s). See goals on Care Plan in [facility] electronic health record for goal details.  Goals partially met.  Barriers to achieving goals:   discharge from facility.    Therapy recommendation(s):    Continued therapy is recommended.  Rationale/Recommendations:  Home with home therapy to address strength/activity tolerance as pt is not at baseline.

## 2021-10-25 NOTE — DISCHARGE INSTRUCTIONS
Rivaroxaban (By mouth)  Rivaroxaban ([id number]-[id number]-a-ban)    Treats and prevents blood clots, which lowers the risk of stroke, deep vein thrombosis (DVT), pulmonary embolism (PE), heart attack, and similar conditions. Also prevent blood clots in people who are hospitalized for an acute illness. This medicine is a blood thinner.    Brand Name(s):Xarelto,Xarelto Starter Pack  There may be other brand names for this medicine.    When This Medicine Should Not Be Used:  This medicine is not right for everyone. Do not use it if you had an allergic reaction to rivaroxaban, or if you have severe bleeding.    How to Use This Medicine:  Tablet    Your doctor will tell you how much medicine to use. Do not use more than directed. Take this medicine at the same time each day.    2.5 milligram (mg) or 10 mg tablet: Take with or without food.    15 mg or 20 mg tablet: Take with food.    If you cannot swallow the tablets whole, you may crush the tablet and mix it with a small amount of applesauce. Eat some food right after you swallow the mixture.    Tube feeding: You may crush the tablet and mix the medicine in 50 milliliters (mL) of water before giving it via the tube. This must be followed by a feeding.    This medicine should come with a Medication Guide. Ask your pharmacist for a copy if you do not have one.     Missed dose:   o Ask your doctor or pharmacist if you are not sure what to do if you miss a dose.  o Once-daily dose: If you miss a dose or forget to use your medicine, use it as soon as you can on the same day. Do not use extra medicine to make up for a missed dose.  o Twice-daily dose to prevent serious heart or blood vessel problems (2.5 mg tablet): If you miss a dose, skip the missed dose and take your next dose at your usual time.  o Twice-daily dose to treat a blood clot (15 mg tablet): If you miss a dose or forget to use your medicine, use it as soon as you can on the same day. You may take 2 doses at the same time  to make up for the missed dose. This is only for people who take a total of 30 mg per day.    Store the medicine in a closed container at room temperature, away from heat, moisture, and direct light. You may store the crushed tablet with applesauce or water mixture for up to 4 hours.    Drugs and Foods to Avoid:  Ask your doctor or pharmacist before using any other medicine, including over-the-counter medicines, vitamins, and herbal products.    Some medicines can affect how rivaroxaban works. Tell your doctor if you are using any of the following:  o Carbamazepine, conivaptan, erythromycin, indinavir, itraconazole, ketoconazole, lopinavir, phenytoin, rifampin, ritonavir, Bonny's wort  o Another blood thinner (including clopidogrel, enoxaparin, heparin, warfarin)  o NSAID pain or arthritis medicine (including aspirin, celecoxib, diclofenac, ibuprofen, naproxen)    Warnings While Using This Medicine:    Tell your doctor if you are pregnant or breastfeeding, or if you have kidney disease, liver disease, bleeding problems, cancer, lung problems, an artificial heart valve, or antiphospholipid syndrome.    This medicine may increase your risk of bleeding. Be careful to avoid injuries that could cause bleeding. Stay away from rough sports or other situations where you could be bruised, cut, or hurt. Brush and floss your teeth gently. Be careful when using sharp objects, including razors and fingernail clippers. Avoid picking your nose. If you need to blow your nose, blow it gently.    This medicine may cause nerve damage if you have a medical procedure done to your back, including anesthesia or a spinal puncture. This is more likely to happen if you have a history of back injury, back surgery, problems with your spine, or procedures or punctures to your back. Tell your doctor if you are also taking another blood thinner, because this also increases the risk.    Do not stop using this medicine suddenly without asking  your doctor. You might have an increased risk for stroke for a short time after you stop using this medicine.    Tell any doctor or dentist who treats you that you are using this medicine.    Your doctor will do lab tests at regular visits to check on the effects of this medicine. Keep all appointments.     Keep all medicine out of the reach of children. Never share your medicine with anyone.     Possible Side Effects While Using This Medicine:  Call your doctor right away if you notice any of these side effects:    Allergic reaction: Itching or hives, swelling in your face or hands, swelling or tingling in your mouth or throat, chest tightness, trouble breathing    Blistering, peeling, red skin rash    Decrease in how much or how often you urinate    Heavy menstrual bleeding or vaginal bleeding    Red or brown urine, bloody or black, tarry stools    Unusual bleeding or bruising, including frequent nosebleeds    Vomiting of blood or material that looks like coffee grounds    If you notice other side effects that you think are caused by this medicine, tell your doctor.  Call your doctor for medical advice about side effects. You may report side effects to FDA at 6-895-UIE-8363    Oxygen Provider:  Arranged through [employer] contact number [phone].  If you have any questions or concerns please call the oxygen company directly.

## 2021-10-25 NOTE — PLAN OF CARE
Took over patient's care at 1500.  Patient alert and orientated x 4 - has some intermittent confusion noted.  Patient very [place].      Lung sounds diminished throughout.  Continues on oxygen at 5 LPM via NC with humidification - tolerating well.  Complained of nasal dryness - PRN nasal spray given.  Abdomen soft - non tender - bowel sounds audible and normoactive.  Last BM noted on 10/20 - PRN senna given this evening.  Voiding well - cath patent - catheter cares completed - new securement device placed.  Skin - intact - has sacral mepilex on for rash/sore bottom noted, also has scattered bruising/abrasion.  Vitals stable - B/P continues to be soft - patient asymptomatic.  IV patent - flushed well.  Up to chair for meal - ate well.  Up in room with assist of 1/walker/belt - tolerates well.  HS blood sugar - 118 - snack given.  Patient resting comfortably at end of shift.    Alarms on for safety.  Will use call light to make needs known.     Plan: continue to monitor respiratory status and oxygen needs.    Maura Singletary RN 11:40 PM 10/24/2021

## 2021-10-25 NOTE — PLAN OF CARE
Pt discharged home at 1545 with son Tab.  O2 sats at 93% on 4L.  Pt tolerating diet, ambulating with walker and SBA.  Voiding, had BM today.  Discharge instructions reviewed with pt and his son, copy provided.  Referral made for [facility]. Prescription for xarelto sent to pharmacy.  Pt's son expressed some concerns about discharge home. Offered to call MD to arrange discharge to TCU, pt and his son decline, choose to discharge home with home care.  Pt left with portable O2 at 4L per NC.  Instruction provided on O2 use.  All belongings sent home with pt.

## 2021-10-25 NOTE — DISCHARGE SUMMARY
[facility]    Discharge Summary  Hospital Medicine    Date of Admission:  10/8/2021  Date of Discharge:  10/25/2021   Discharging Provider: Sander Ware  Date of Service: 10/25/2021      Primary Care     [place], [address] [place] [facility] [address]      Identification and Chief Compaint:  Remington Gutierrez is a 75 year old male admitted on 10/8/2021. [facility] with history of COPD, systolic heart failure, S/P AICD, type 2 diabetes, and anxiety/depression. He presented with increasing shortness of breath for 4 days, especially with activity and lying down, nonproductive cough, and was found to be COVID positive.     Discharge Diagnoses     Confirmed COVID-19 infection  Viral pneumonia secondary to COVID-19 infection  Acute hypoxemic respiratory failure secondary to COVID-19 pneumonia  Volume overload pulmonary edema  Acute kidney injury  Chronic kidney disease stage II  Acute metabolic encephalopathy due to COVID-19  Acute toxic encephalopathy secondary to systemic corticosteroids and zolpidem  Mild cognitive impairment versus dementia  Chronic systolic congestive heart failure  Nonischemic cardiomyopathy  Constipation  Acute urinary retention  Essential hypertension  Bradycardia  Chronic obstructive pulmonary disease  Acute troponin elevation secondary to type II myocardial infarction from hypoxemic failure and COVID-19 infection  Diabetes mellitus type 2  Obstructive sleep apnea  Anxiety with depression  Insomnia  Sinus node dysfunction  AICD in place 5/4/2021  Incidental right lower lobe 0.9 mm pulmonary nodule  Rosacea    Discharge Disposition   Discharged to home    Discharge Orders      COVID-19 GetWell Loop Referral      Home Care Nursing Referral      Reason for your hospital stay    You became ill in the middle part of October with symptoms that included shortness of breath, cough, and fever. You presented to the emergency department on  10/25/2021, and were found to be positive for COVID-19. Chest imaging revealed that you had pneumonia due to COVID-19. Your oxygen levels worsened on admission, requiring transfer to the intensive care unit, where you stayed for roughly 2 weeks, alternating between high flow oxygen through the nose and BiPAP with high oxygen levels. Fortunately, with steroid and antiviral therapy, your lungs began to improve. We were able to get you off of the BiPAP and high flow oxygen and onto a standard nasal cannula. As of today, your oxygen need is low enough at 4 L/min that you can go home. I have prescribed oxygen to be set up in your home at 4 L/min. I would anticipate that you will need oxygen for a few weeks, but that your oxygen levels will eventually recover.    Once you feel close to normal, probably in a few weeks, you can receive your second vaccine dose.     Contact provider    Contact your primary care provider if If increased trouble breathing, new arm/leg swelling, dizziness/passing out, falls, bleeding that doesn't stop, or uncontrolled pain.     Follow-up and recommended labs and tests     Follow up with primary care provider, [facility], within 7 days for hospital follow- up.  The following labs/tests are recommended:     1.  CT chest is recommended approximately 1/16/2022 (3 months) for follow-up of an incidental right lower lobe nodule seen on admission CT chest here.     Discharge - Quarantine/Isolation Instruction    Date of symptom onset: 10/4/2021  Date of first positive test: 10/8/2021  You have been hospitalized long enough that you no longer require any sort of COVID-19 isolation upon discharge. However, when out in society, please adhere to current guidelines regarding social distancing, mask wearing, etc.     Discharge - COVID Patient Oximeter Discharge Instructions    COVID Patient Oximeter Discharge Instructions     Use the oximeter to measure the amount of oxygen in your blood.  Put the  clip on the tip of your pointer finger.  Turn on the device to measure your oxygen level.  Do this at least 2 times a day. Do it more than 2 times if you feel short of breath.      It should be 90% or higher while you're at rest.  If your oxygen level is 89% or lower, change the position of the clip on your finger or try another finger.  After 10 minutes if it's still 89% or lower, call 911/go to the Emergency Department.       If your shortness of breath may be getting worse but the oximeter still shows 90% or higher, call your doctor or clinic as soon as possible. If you can't reach your doctor or clinic, or if your shortness of breath gets very bad, call 911/go to the Emergency Department.    Note: Don't turn down your oxygen until you get instructions at your virtual visit.     Activity    Your activity upon discharge: activity as tolerated.     MD face to face encounter    Documentation of Face to Face and Certification for Home Health Services    I certify that patient: Remington Gutierrez is under my care and that I, or a nurse practitioner or physician's assistant working with me, had a face-to-face encounter that meets the physician face-to-face encounter requirements with this patient on: 10/25/2021.    This encounter with the patient was in whole, or in part, for the following medical condition, which is the primary reason for home health care: Continuation of home RN services with Maura eid following prolonged hospital stay for COVID-19 pneumonia.    I certify that, based on my findings, the following services are medically necessary home health services: Nursing, Physical Therapy, and home health aide.    My clinical findings support the need for the above services because: Nurse is needed: To provide assessment and oversight required in the home to assure adherence to the medical plan due to a mild chronic cognitive deficit.  Physical Therapy Services are needed to assess and treat the following  functional impairments: Acquired weakness and deconditioning following prolonged hospitalization for COVID-19 pneumonia, and home health aide is required to assist patient with cares that he is unable to perform unassisted.    Further, I certify that my clinical findings support that this patient is homebound (i.e. absences from home require considerable and taxing effort and are for medical reasons or [employer] services or infrequently or of short duration when for other reasons) because: Mental health symptoms including: Known cognitive impairment.    Based on the above findings. I certify that this patient is confined to the home and needs intermittent skilled nursing care, physical therapy and/or speech therapy.  The patient is under my care, and I have initiated the establishment of the plan of care.  This patient will be followed by a physician who will periodically review the plan of care.  [facility]Provider [facility] provide [facility] ([facility] certified [facility] provider): Sander Ware MD  Physician Signature: See electronic signature associated with these discharge orders.  Date: 10/25/2021     Full Code     Oxygen Adult/Peds    Oxygen Documentation:   I certify that this patient, Remington Gutierrez has been under my care (or a nurse practitioner or physican's assistant working with me). This is the face-to-face encounter for oxygen medical necessity.      Remington Gutierrez is now in a chronic stable state and continues to require supplemental oxygen. Patient has continued oxygen desaturation due to COVID-19 pneumonia.    Alternative treatment(s) tried or considered and deemed clinically infective for treatment of COVID-19 pneumonia include inhalers, steroids, pulmonary toileting, and antivirals.  If portability is ordered, is the patient mobile within the home? yes    **Patients who qualify for home O2 coverage under the CMS guidelines  require ABG tests or O2 sat readings obtained closest to, but no earlier than 2 days prior to the discharge, as evidence of the need for home oxygen therapy. Testing must be performed while patient is in the chronic stable state. See notes for O2 sats.**     Diet    Follow this diet upon discharge: Orders Placed This Encounter      Snacks/Supplements Adult: Other; sugar free vanilla pudding; With Meals      Snacks/Supplements Adult: Other; chocolate ice cream; With Meals      Combination Diet Consistent Carb 75 Grams CHO per Meal Diet; Easy to Chew (level 7); Thin Liquids (level 0)        Discharge Medications   Current Discharge Medication List      START taking these medications    Details   rivaroxaban ANTICOAGULANT (XARELTO) 10 MG TABS tablet Take 1 tablet (10 mg) by mouth daily (with dinner)  Qty: 30 tablet, Refills: 0    Associated Diagnoses: Infection due to 2019 novel coronavirus         CONTINUE these medications which have NOT CHANGED    Details   albuterol (PROAIR HFA/PROVENTIL HFA/VENTOLIN HFA) 108 (90 Base) MCG/ACT inhaler Inhale 2 puffs into the lungs 4 times daily as needed      brimonidine (ALPHAGAN) 0.2 % ophthalmic solution Place 1 drop into the right eye 2 times daily      doxycycline hyclate (VIBRAMYCIN) 50 MG capsule Take 50 mg by mouth daily      empagliflozin (JARDIANCE) 25 MG TABS tablet Take 12.5 mg by mouth daily      glipiZIDE (GLUCOTROL) 5 MG tablet Take 5 mg by mouth daily      Glucose Blood (BLOOD GLUCOSE TEST STRIPS) STRP by In Vitro route twice a week      lamoTRIgine (LAMICTAL) 150 MG tablet Take 150 mg by mouth 2 times daily      meclizine 25 MG CHEW Take 50 mg by mouth every 12 hours as needed for dizziness      metFORMIN (GLUCOPHAGE) 1000 MG tablet Take 500 mg by mouth 2 times daily (with meals)      PARoxetine (PAXIL) 40 MG tablet Take 40 mg by mouth At Bedtime      simvastatin (ZOCOR) 40 MG tablet Take 20 mg by mouth At Bedtime      tiotropium (SPIRIVA RESPIMAT) 1.25 MCG/ACT  inhaler Inhale 2 puffs into the lungs daily      betamethasone valerate 0.1 % EX external ointment Apply to left hand as needed for skin rash twice daily.  Qty: 45 g, Refills: 3    Associated Diagnoses: Eczema, unspecified type         STOP taking these medications       furosemide (LASIX) 20 MG tablet Comments:   Reason for Stopping:         lisinopril (ZESTRIL) 5 MG tablet Comments:   Reason for Stopping:         meclizine (ANTIVERT) 12.5 MG tablet Comments:   Reason for Stopping:         meloxicam (MOBIC) 15 MG tablet Comments:   Reason for Stopping:         metoprolol succinate ER (TOPROL-XL) 50 MG 24 hr tablet Comments:   Reason for Stopping:         phytonadione (MEPHYTON) 5 MG tablet Comments:   Reason for Stopping:         zolpidem (AMBIEN CR) 12.5 MG CR tablet Comments:   Reason for Stopping:             Allergies   Allergies   Allergen Reactions     Neomycin Rash       Consultations This Hospital Stay    CARE MANAGEMENT / SOCIAL WORK IP CONSULT  PHARMACY IP CONSULT  PHYSICAL THERAPY ADULT IP CONSULT  OCCUPATIONAL THERAPY ADULT IP CONSULT    Significant Results and Procedures   Procedures    None.    Data   Results for orders placed or performed during the hospital encounter of 10/08/21   Lung vent and perf (NM)    Narrative    NUCLEAR MEDICINE LUNG VENTILATION AND PERFUSION 10/8/2021 9:44 AM     HISTORY: PE suspected, low/intermediate probability, positive D-dimer.  COVID. Renal insufficiency.    COMPARISON: None.    TECHNIQUE: Tc-99m MAA injected intravenously for evaluation of  pulmonary perfusion.  Tc-99m DTPA inhaled for the ventilation phase.    DOSE: 63 mCi technetium DTPA, 5.3 mCi technetium MAA.    FINDINGS: There are patchy ventilatory and perfusion defects in both  lungs that are matched. Areas of defect appear to correspond to  abnormalities on chest x-ray.      Impression    IMPRESSION: Low probability for pulmonary embolism.    LOYD GIBSON MD         SYSTEM ID:  [id number]   XR Chest Port 1  View    Narrative    CHEST ONE VIEW PORTABLE   10/8/2021 9:17 AM     HISTORY: Shortness of breath    COMPARISON: 7/23/2021      Impression    IMPRESSION: Left-sided pacer/defibrillator device. Heart enlargement  grossly similar to prior. There are new bilateral interstitial and  groundglass opacities which could be related to pulmonary edema or  atypical infection.    LOYD GIBSON MD         SYSTEM ID:  [id number]   XR Chest Port 1 View    Narrative    EXAM: XR CHEST PORT 1 VIEW  LOCATION: [facility]  DATE/TIME: 10/16/2021 6:40 AM    INDICATION: COVID, persistent hypoxia - please compare to previous  COMPARISON: 10/08/2021, 7/23/2021 and 10/19/2020      Impression    IMPRESSION: Multi lead left subclavian venous pacer. Slightly better inspiration.    Fine, relatively symmetrical interstitial and groundglass opacities are again noted. Small, new focal areas of coalescing opacities noted in the right lower lobe laterally. Findings suggest acute lung injury and radiographic appearance is not typical of   Covid pneumonia. No hydropneumothorax. Heart is of normal size. Lower anterior cervical spinal fusion apparatus again noted.    Imaging features are atypical or uncommonly reported for (COVID-19) pneumonia. Alternative diagnoses should be considered.   CT Chest Pulmonary Embolism w Contrast     Value    Radiologist flags Lung nodule    Narrative    EXAM: CT CHEST PULMONARY EMBOLISM W CONTRAST  LOCATION: [facility]  DATE/TIME: 10/16/2021 10:38 AM    INDICATION: PE suspected, low/intermediate prob, positive D-dimer. Shortness of breath. COVID-19.  COMPARISON: Chest x-rays, most recently performed earlier today.  TECHNIQUE: CT chest pulmonary angiogram during arterial phase injection of IV contrast. Multiplanar reformats and MIP reconstructions were performed. Dose reduction techniques were used.   CONTRAST: 79mL Isovue-370    FINDINGS:  ANGIOGRAM CHEST: Pulmonary  arteries are normal caliber and negative for pulmonary emboli. Thoracic aorta is not well opacified and is  indeterminate for dissection. No CT evidence of right heart strain.    LUNGS AND PLEURA: Moderate diffuse basilar and peripheral predominant groundglass opacities. Few mild patchy bibasilar consolidative opacities. No pleural effusion or pneumothorax.    9 mm average diameter right lower lobe pulmonary nodule (series 6, image 180).    MEDIASTINUM/AXILLAE: Left-sided dual-lead pacemaker. Cardiomegaly. No pericardial effusion. No lymphadenopathy.    CORONARY ARTERY CALCIFICATION: Mild.    UPPER ABDOMEN: No significant incidental findings in the visualized upper abdomen.    MUSCULOSKELETAL: Few old healed rib fracture deformities. Mild degenerative changes of the thoracic spine. Partially visualized changes of anterior cervical spinal fusion.      Impression    IMPRESSION:  1.  No pulmonary embolism.    2.  Moderate diffuse opacities, consistent with reported history of COVID-19 pneumonia.    3.  9 mm right lower lobe pulmonary nodule. Recommend 3 month follow-up chest CT, as detailed below.    4.  Cardiomegaly.    REFERENCE:  Guidelines for Management of Incidental Pulmonary Nodules Detected on CT Images: From the Fleischner Society 2017.   Guidelines apply to incidental nodules in patients who are 35 years or older.  Guidelines do not apply to lung cancer screening, patients with immunosuppression, or patients with known primary cancer.    SINGLE NODULE    Nodule size >8 mm  Either low or high-risk patients:  Consider CT, PET/CT, or tissue sampling at 3 months.    Consider referral to lung nodule clinic.      [Recommend Follow Up: Lung nodule]    This report will be copied to the [facility] to ensure a provider acknowledges the finding.        CT Chest/Abdomen/Pelvis w Contrast    Narrative    EXAM: CT CHEST/ABDOMEN/PELVIS W CONTRAST  LOCATION: [facility]  DATE/TIME:  10/19/2021 7:29 PM    INDICATION: COVID patient with some abdominal pain which he thinks is constipation, also with increased o2 needs - ? constipation vs other intra-abdominal process, assess for other acute process in chest superimposed on COVID  COMPARISON: Chest CT from 10/16/2021.  TECHNIQUE: CT scan of the chest, abdomen, and pelvis was performed following injection of IV contrast. Multiplanar reformats were obtained. Dose reduction techniques were used.   CONTRAST: 84 mL Isovue 370    FINDINGS:   LUNGS AND PLEURA: Diffuse hazy groundglass infiltrates throughout both lungs similar to the prior study and compatible with known history of COVID-19 pneumonitis. Prominent dense area of consolidation in the right middle lobe laterally has increased from   the prior study. There remains an indeterminate 9 mm nodule in the right lower lobe. Small benign calcified granuloma left upper lobe. No pleural effusions.    MEDIASTINUM/AXILLAE: No significant adenopathy. Mild cardiac enlargement. No pericardial effusion. Normal caliber thoracic aorta. Esophagus is grossly negative. Left-sided cardiac pacer.    CORONARY ARTERY CALCIFICATION: Minimal    HEPATOBILIARY: Technically indeterminate 7 mm rounded nodular area of enhancement in the left hepatic lobe on series 8 image 130. This most likely represents a small benign vascular lesion or perfusion anomaly. There are a couple subcentimeter   low-attenuation lesions present which most likely represent small benign cysts and/or benign vascular lesions and are of doubtful significance. No calcified gallstones or biliary dilatation.    PANCREAS: Normal.    SPLEEN: Normal.    ADRENAL GLANDS: Normal.    KIDNEYS/BLADDER: Tiny left renal cyst needs no follow-up. Kidneys are otherwise negative. No hydronephrosis. Rose catheter in the bladder which is decompressed.    BOWEL: Bowel is normal in caliber with no evidence for obstruction. Negative for appendicitis. Moderate amount of stool  in the rectosigmoid colon. No acute bowel findings.    LYMPH NODES: Normal.    VASCULATURE: Diffuse aortoiliac atheromatous disease. Negative for aneurysm.    PELVIC ORGANS: Normal.    MUSCULOSKELETAL: Old left rib fracture. Anterior fusion hardware partially visualized in the lower cervical spine. Prominent degenerative changes throughout the spine.      Impression    IMPRESSION:  1.  Hazy diffuse groundglass infiltrates throughout both lungs compatible with known history of COVID-19 pneumonitis and not significantly changed from the prior study.    2.  Prominent dense area of consolidation in the right middle lobe is seen laterally and has increased in size since the prior study suggesting a worsening focal area of pneumonitis.    3.  Indeterminate 9 mm nodule in the right lower lobe is unchanged. As was recommended on the prior study, 3 month follow-up CT is recommended for reevaluation following appropriate therapy for the patient's pneumonitis.    4.  Evidence of prior granulomatous disease.    5.  Cardiac enlargement.    6.  No acute findings in the abdomen or pelvis.    7.  7 mm nodular area of enhancement in the left hepatic lobe is nonspecific but has benign imaging characteristics most likely reflecting a benign vascular lesion. No specific follow-up needed if there are no risk factors present such as history of   malignancy. If there is clinical concern, an MRI could be obtained in 3-6 months for reevaluation.    8.  Moderate amount of stool in the rectosigmoid colon. No evidence for bowel obstruction or acute bowel findings.   XR Chest Port 1 View    Narrative    EXAM: XR CHEST PORT 1 VIEW  LOCATION: [facility]  DATE/TIME: 10/22/2021 3:23 AM    INDICATION: Shortness of breath. Hypoxia.  COMPARISON: 10/16/2021.    FINDINGS: Left subclavian cardiac device. No pneumothorax. The heart is enlarged. There are increased bilateral pulmonary infiltrates, worst in the right lung  laterally. Fusion hardware in the cervical spine. Old left clavicle fracture.      Impression    IMPRESSION: Increasing bilateral pneumonia.   Echocardiogram Complete     Value    LVEF  30-35%    Narrative    [id number]  [id number]  [id number]  [id number]^YAMILA^CHEMA^L     [facility]  Echocardiography Laboratory  [address] [place] [address]     Name: CHAU CHOUDHURY  MRN: [id number]  : 1945  Study Date: 10/11/2021 08:48 AM  Age: 75 yrs  Gender: Male  Patient Location: [facility]  Reason For Study: CHF  Ordering Physician: CHEMA DRISCOLL  Referring Physician: Laura Gregory  Performed By: Jami Jimenez RDCS     BSA: 2.0 m2  Height: 68 in  Weight: 187 lb  HR: 63  BP: 116/76 mmHg  ______________________________________________________________________________  Procedure  Complete Portable Echo Adult. Optison (NDC #5966-2152) given intravenously.  ______________________________________________________________________________  Interpretation Summary     Technically challenging study even with the use of contrast imaging.     The left ventricle is moderately dilated.  Left ventricular systolic function is severely reduced. The visual ejection  fraction is 30-35%.  Endocardial borders are not optimally visualized, however there appears to be  global hypokinesis, worse in the apical wall segments. Septal wall motion is  also abnormal, which could reflect conduction abnoramility.  Right ventricle is not well visualized, however global systolic function is  probably mildly reduced.  Dilation of the inferior vena cava is present with normal respiratory  variation in diameter.     There are no prior studies available for comparison.  ______________________________________________________________________________  Left Ventricle  The left ventricle is moderately dilated. There is mild to moderate eccentric  left ventricular hypertrophy. Left ventricular systolic function is severely  reduced. The  visual ejection fraction is 30-35%. Grade II or moderate  diastolic dysfunction. Endocardial borders are not optimally visualized,  however there appears to be global hypokinesis, worse in the apical wall  segments. Septal wall motion is also abnormal, which could reflect conduction  abnoramility.     Right Ventricle  The right ventricle is grossly normal size. Right ventricle is not well  visualized, however global systolic function is probably mildly reduced. There  is a pacemaker lead in the right ventricle.     Atria  The left atrium is mildly dilated. Right atrium not well visualized.     Mitral Valve  There is mild to moderate mitral annular calcification.     Tricuspid Valve  There is trace to mild tricuspid regurgitation. The right ventricular systolic  pressure is approximated at 29.4 mmHg plus the right atrial pressure.     Aortic Valve  The aortic valve is not well visualized. Valve morphology is not well  visualized, however it is heavily calcified.     Pulmonic Valve  The pulmonic valve is not well seen, but is grossly normal. The pulmonic valve  is not well visualized.     Vessels  The aortic root is normal size. Dilation of the inferior vena cava is present  with normal respiratory variation in diameter.     Pericardium  There is no pericardial effusion.     ______________________________________________________________________________  MMode/2D Measurements & Calculations  IVSd: 0.98 cm  LVIDd: 6.5 cm  LVIDs: 5.6 cm  LVPWd: 0.95 cm  FS: 13.0 %  LV mass(C)d: 266.4 grams  LV mass(C)dI: 134.1 grams/m2     Ao root diam: 3.6 cm  LA dimension: 3.4 cm  asc Aorta Diam: 3.5 cm  LA/Ao: 0.94  LA Volume (BP): 72.0 ml  LA Volume Index (BP): 36.2 ml/m2  RWT: 0.29     Doppler Measurements & Calculations  MV E max yoanna: 68.3 cm/sec  MV A max yoanna: 77.5 cm/sec  MV E/A: 0.88  MV dec time: 0.30 sec     TR max yoanna: 271.0 cm/sec  TR max P.4 mmHg  E/E' av.4  Lateral E/e': 15.9  Medial E/e': 22.8    "  ______________________________________________________________________________  Report approved by: Shanique Ibanez 10/11/2021 10:36 AM             History of Present Illness   (From H&P) Remington Gutierrez is a 75 year old male who 76yo with history of COPD, CHF, diabetes, BENNY, pacemaker and dementia who presents with dyspnea.  Has had about 3-4 days of cough, dyspnea and malaise.  No clear-cut fever.  Patient not able to give detailed report of symptoms beyond this.  Currently feels \"pretty good\" on 5LNC.  No dyspnea on oxygen, no distress.  sats running mid-90's.  No pain at present.  Afebrile.  No other new concerns.  Patient denies any recent medication changes.    Did get his COVID vaccine #1 about 5 days prior to onset of symptoms.        No tobacco, alcohol or illicit drug use.      Hospital Course     Remington Gutierrez is a 75 year old male admitted on 10/8/2021. [facility] with history of COPD, systolic heart failure, S/P AICD, type 2 diabetes, and anxiety/depression. He presented with increasing shortness of breath for 4 days, especially with activity and lying down, nonproductive cough, and was found to be COVID positive.     # Acute Hypoxic Respiratory Failure secondary to COVID-19 infection  # Viral Pneumonia secondary to COVID-19 infection     Symptom Onset 10/4/2021   Date of 1st Positive Test 10/8/2021   Vaccination Status Partially Vaccinated - 1st dose < 1 week prior to onset of symptoms    End-isolation date 10/24/2021      Initial chest x-ray demonstrated bilateral interstitial and groundglass opacities consistent with pneumonia.  He initially required oxygen at 5 L/min per nasal cannula, though he came more hypoxemic 10/9/2021, requiring the use of HFNC oxygen. He required ICU placement 10/9/2021, where he remained for more than a week alternated between BiPAP support at high FiO2, and high flow nasal cannula oxygen at high flows and FiO2.  He was treated with dexamethasone 6 mg " daily given 10/8 - 10/17/2021.  Remdesivir was eventually started 10/10/2021 once renal function improved, and a 5 day course completed 10/14/2021.  Tocilizumab 600 mg IV was given 10/10/2021 after discussion with Dr. Tapia, infectious disease. Oxygenation gradually improved. On 10/22/2021, the patient was changed from an HFNC to a simple mask, and eventually to a nasal cannula on 10/24/2021. This morning, oxygen saturation at rest is 90 to 92% on 4 L/min per nasal cannula oxygen. His respiratory symptoms are very mild. He has no rest dyspnea, mild exertional dyspnea, mild nonproductive cough, and he has been afebrile.   He wishes to go home today, knowing that he will require discharge on supplemental oxygen.       -Discharge home today.  -The patient will require supplemental oxygen upon discharge.   SpO2 on room air at rest: 85%    SpO2 on 4 L/min per cannula at rest: 92%    I certify that this patient, Remington Gutierrez has been under my care (or a nurse practitioner or physican's assistant working with me). This is the face-to-face encounter for oxygen medical necessity.      Remington Gutierrez is now in a chronic stable state and continues to require supplemental oxygen. Patient has continued oxygen desaturation due to COVID-19 pneumonia.    Alternative treatment(s) tried or considered and deemed clinically infective for treatment of COVID-19 pneumonia include inhalers, steroids, pulmonary toileting and antivirals..  If portability is ordered, is the patient mobile within the home? Yes.    **Patients who qualify for home O2 coverage under the CMS guidelines require ABG tests or O2 sat readings obtained closest to, but no earlier than 2 days prior to the discharge, as evidence of the need for home oxygen therapy. Testing must be performed while patient is in the chronic stable state. See notes for O2 sats.**    -He will be discharged on his preadmission inhalers, Umeclidinium and albuterol, see  below.  -The patient and I discussed the benefit versus risk of extending DVT prophylaxis for 30 days after discharge. He agrees to treatment with rivaroxaban 10 mg p.o. daily for 30 days, which I have prescribed.       Volume overload pulmonary edema adding to hypoxemia  Oxygen needs began to increase 10/16/2021.  Chest x-ray 10/16/2021 demonstrated continued bilateral interstitial and groundglass opacities consistent with COVID-19 pneumonia, along with new areas of airspace opacity in the lateral right lower lobe.  CT chest 10/16/2021 demonstrated moderate diffuse opacities consistent with COVID-19 pneumonia; no new infiltrates were described.  Furosemide 20 mg IV twice daily was resumed 10/16/2021, and continued through 10/18/2021, at which point it had to be stopped due to decreasing GFR.  Furosemide was briefly resumed, then stopped again 10/21/2021 due to mild hypotension.  -Acute pulmonary resolved.  Furosemide is not resumed at discharge, discussed below under hypertension.     Acute Kidney Injury  Chronic kidney disease stage 2  Baseline GFR appears to be 60-65.  Admission GFR 29, likely representing acute kidney injury due to volume depletion from acute illness.  He received a 500 mL IV fluid bolus in ED, and preadmission lisinopril, furosemide, and Mobic were held.  Furosemide was resumed 10/12/2021. There was a gradual improvement in GFR: 29 --> 33 --> 38 --> 49 --> 60 -->64 as of 10/15/2021.  GFR again declined in response to IV furosemide diuresis started 10/16/2021.  Furosemide was held after the 10/18/2021 doses.  GFR continues to be variable: 49 --> 55 --> 63 --> 53 --> 63.  Preadmission furosemide 20 mg twice daily has been on hold since 10/21/2021 due to mild hypotension, and will not be resumed upon discharge as volume status and blood pressure are adequate without furosemide.  -Preadmission furosemide is not resumed at discharge.  -Preadmission lisinopril remains on hold, see under hypertension  below.  -Continue to hold Mobic until necessary for symptom control, will not resume at discharge.     Acute encephalopathy due to COVID-19 and corticosteroids  Acute toxic encephalopathy due to zolpidem 10/12 - 10/13/2021  Possible mild cognitive impairment/dementia  Noted to have some cognitive impairment on chart review, but not quantified, and no specific diagnosis has been made.  He was more acutely confused in the ER, pulling out IV's x 2 and removing his oxygen.  However, at time of admission H+P he was improved and appeared at cognitive baseline.  There had been no additional confusion or agitation until the early morning hours of 10/13/2021, at which time the patient was noted to be confused, agitated, and taking out his HFNC cannula.  He had gotten zolpidem 6.25 mg p.o. at bedtime, not a new medication for him, though I thought maybe the combination of zolpidem and dexamethasone was enough to cause the morning episode.  Zolpidem was stopped.  Ramelteon 8 mg p.o. at at bedtime was tried, though the patient slept poorly, and did not wish to continue it.  Lorazepam 1 mg p.o. nightly was started 10/14/2021.  The patient has tolerated prazepam well.  We have seen no confusion and delirium, or agitation.  Today, he is awake, alert, appropriate in conversation.  -Zolpidem is discontinued from preadmission medication list.  -I do not anticipate that the patient will require lorazepam therapy after hospital discharge. I have not included as a discharge medication.     Chronic Systolic Congestive Heart Failure   Dilated Cardiomyopathy  Noted to have history of systolic CHF and dilated cardiomyopathy. Takes furosemide 20 mg twice daily PRN at baseline.  [facility]. Non-ishemic cardiomyopathy. ECHO 4/2021 - EF 25-30%. Severe LV dilation. Global hypokinesis. RVSF mildly reduced. Cardiac MRI 2/2021- c/w Non-ishemic cardiomyopathy. Ascending aorta 4.1 cm. Admitted with acute CHF [facility] 9/7-12/2021. Dry weight 202#  per [facility] discharge summary 9/2021.     On admission BNP elevated 3,907 (similar to 7/2021 ED visit for CHF/COPD). CXR on admission consistent with COVID pneumonia but not CHF.  Echocardiogram performed 10/11/2021 demonstrated a moderately dilated left ventricle, with severely reduced left ventricular systolic function, ejection fraction 30 to 35%.  Global hypokinesis was seen.  The patient required 2 days of IV diuresis for acute pulmonary edema, details above.  He appeared to be well compensated after IV furosemide, so preadmission furosemide 20 mg p.o. twice daily was resumed.  We have had to hold furosemide since 10/21/2021 due to mild hypotension.  There is no evidence of decompensation on exam over the last few days despite discontinuation of furosemide.  -Lisinopril remains on hold.  Given improvement in renal function to baseline, it could be resumed if necessary after discharge.  -Furosemide 20 mg p.o. twice daily is not resumed at discharge. It could be resumed if needed as an outpatient for control of blood pressure and/or fluid volume.      Abdominal discomfort due to constipation and urinary retention  Patient reported some intermittent abdominal cramping 10/19/2021.  Abdominal exam was unremarkable at that time.  He was found to have some urinary retention, though abdominal discomfort persisted even after Rose catheter placement.  CT abdomen 10/19/2021 demonstrated a moderate amount of stool in the colon.  He was treated with a Dulcolax suppository and a fleets enema, though had no response.  An additional enema was given, with moderate BM in response.  Abdominal discomfort resolved, and has not recurred.  -Resolved.        Essential hypertension   Managed prior to admission with lisinopril 2.5 mg daily, metoprolol XL 25 mg daily and furosemide 20 mg p.o. twice daily.  Metoprolol had to be held 10/12/2021 due to bradycardia while also on remdesivir, then was resumed 10/16/2021 when remdesivir was  complete.  Furosemide was resumed at preadmission dose.  Lisinopril has remained on hold since admission.  Blood pressure was running low as of 10/21/2021 morning, so furosemide was subsequently put on hold.  Blood pressure continues to run primarily 100-110 systolic off of antihypertensives.  -Metoprolol is not resumed at discharge as blood pressure control is adequate off of antihypertensives.  -Furosemide also not resumed at discharge as it has not been necessary over the last few days for the control of volume or blood pressure.  -Lisinopril has been on hold since admission, and will not be resumed at discharge.     Bradycardia  The patient had heart rates intermittently as low as 38/min.  Bradycardia was probably a result of combined remdesivir and metoprolol therapy.  Because I wanted him to complete 5 days of remdesivir, I put a temporary hold on metoprolol.  Bradycardia improved once remdesivir was complete, and metoprolol was resumed.  Note that metoprolol was put on hold 10/21/2021 due to mild hypotension, described above.  Bradycardia has improved, though still occurs intermittently during sleep, probably due to the patient's underlying BENNY not currently being treated with CPAP.  -Resume CPAP use upon discharge, see below.  -Metoprolol is not resumed at discharge.     COPD    Noted on chart review. PFTs unavailable. Not on home oxygen.  Exam suggests mild to moderate expiratory phase prolongation, without wheeze.  -Continued preadmission albuterol HFA every 4 hours as needed.  -Continue preadmission tiotropium.     Elevated troponin - suspect strain (Type 2 MI) due to COVID infection  Initial troponin 0.107. ECG showed paced rhythm, limiting interpretation. Asymptomatic; patient reported no chest pain prior to admission or since admission.  Suspect this elevation represented strain related to COVID pneumonia and hypoxemic failure.  Next troponins were normal at 0.023 --> 0.019.  -No additional troponin  monitoring undertaken.     Diabetes Mellitus, Type 2  Managed prior to admission with empagliflozin 12.5 mg daily, glipizide 5 mg daily, and metformin 500 mg twice daily.  These outpatient medications have been held since admission. Hemoglobin A1c 7.2 on 10/5/2021.  The patient became hyperglycemic after dexamethasone was added, so insulin glargine, prandial NovoLog, and NovoLog medium resistance sliding scale were added and titrated up.  Dexamethasone is now complete, and hyperglycemia is improving.  He is currently receiving glargine 15 units subcu every morning and a NovoLog medium insulin resistance sliding scale.  Glucose control has been generally good on insulin.  However, in anticipation of discharge, I stopped insulin and resumed his preadmission oral medication regimen 10/24/2021. Glucose on oral medications is initially good.  -Continue preadmission empagliflozin, glipizide, and metformin.     BENNY  Mild, but severe supine by sleep study 2008. Reports using CPAP at home, not currently available for use here.  We saw some bradycardia during sleep off of CPAP here, probably due to untreated BENNY.  -Resume home CPAP after discharge.     Anxiety and Depression  Insomnia  Previously diagnosed, and managed prior to admission with lamotrigine 150 mg twice daily and paroxetine 40 mg p.o. nightly, both continued here.  Mood and affect have been stable this hospital stay.    -Continue preadmission lamotrigine and paroxetine.  -Out of concern that zolpidem was causing morning agitated delirium, zolpidem was stopped on 10/13/2021.  He did markedly better on lorazepam 1 mg p.o. nightly, with encephalopathy resolved as above.  I do not anticipate that he will require lorazepam as an outpatient medication after discharge, so none is prescribed.     Sinus node dysfunction   S/P AICD 5/4/2021  Paced rhythm noted on admission ECG.  I cannot find details in the patient's past medical records as to the properties of his  implantable device.  However, it does appear to be a pacemaker based upon the appearance of his admission EKG.  He continues to have mild, episodic bradycardia, as low as 44/min within the last 24 hours.  I am not sure why bradycardia is occurring with the pacer in place.  -Consider outpatient pacemaker interrogation.     Pulmonary nodule  A 9 mm RLL nodule was seen incidentally on CT chest.  -Advise follow-up CT approximately 1/16/2022 (3 months).     Rosacea  Continue home doxycycline daily.        Pending Results   Unresulted Labs Ordered in the Past 30 Days of this Admission     No orders found from 9/8/2021 to 10/9/2021.          Physical Exam   Temp:  [96.4  F (35.8  C)-98  F (36.7  C)] 97.9  F (36.6  C)  Pulse:  [47-87] 57  Resp:  [16-22] 18  BP: ()/(43-66) 108/47  SpO2:  [90 %-96 %] 92 %  Vitals:    10/22/21 0300 10/24/21 0706 10/25/21 0601   Weight: 82.7 kg (182 lb 5.1 oz) 85 kg (187 lb 6.3 oz) 85.5 kg (188 lb 7.9 oz)       GENERAL: Pleasant man,  seated on the edge of his bed eating breakfast, appears comfortable.  RESP: No accessory muscle use.  Symmetrical breath sounds.  Lungs clear throughout on inspiration.  Expiration sounds mildly to moderately prolonged, no wheeze.  CV: Regular rate and rhythm, non-tachycardic.  Normal S1 S2, no murmur or extra sound.  No lower extremity edema.  ABDOMEN: Soft, non-tender, no guarding.  Bowel sounds positive.  NEURO: Alert, oriented, conversant.  Cranial nerves III - XII grossly intact aside from being very hard of hearing.  No gross motor or sensory deficits.    : Rose catheter in place, will be removed prior to discharge.  PSYCH: Calm, alert, conversant.  Able to articulate logical thoughts, no tangential thoughts, no hallucinations or delusions.  Affect normal.    The discharge plan was discussed with the patient.    Total time on this discharge was 50 minutes.    Sander Ware MD   [facility] Medicine

## 2021-10-25 NOTE — PROGRESS NOTES
Addendum @ 1530-Met with patient, son, Tab at bedside.  Discussed discharge plans as below.  Tab confirms plan to discharge home, but inquires about 'back up plan.'  Discussed with Tab, ultimately if he does not feel safe to manage his father's needs at home, we would cancel discharge and connect with the provider to work on TCU discharge.  Tab declined stating his family, home care team and get well loop will help support his needs.  Tab confirms again, family will provide 24/7 supervision and support.  His home care RN, Di has been in touch with Tab and plans to visit 10/26.  Tab again declines TCU, confirming discharge home with family support.  Discussed directly with chg RN and scar Zamarripa RN.        Care Management Discharge Note    Discharge Date: 10/25/2021       Discharge Disposition: Home, Home Care    Discharge Services: None    Discharge DME: Oxygen    Discharge Transportation: family or friend will provide    Private pay costs discussed: Not applicable  Patient/family educated on Medicare website which has current facility and service quality ratings: yes    Education Provided on the Discharge Plan:  yes  Persons Notified of Discharge Plans: patient, son, Tab and Nathalia, LIAT  Patient/Family in Agreement with the Plan: yes    Handoff Referral Completed: Yes-get well loop care coordination referral made    Additional Information:  Patient ready for discharge today per MD at IDT rounds.  Spoke with patient's son, Tab and Nathalia JOSUE via phone.  Both confirm patient will discharge home with home care and family support.  Tab declines need for TCU, stating family feels TCU would worsen his status, being away from family.  Niece, Yoana will be living with patient.  Tab shares she is able to help support any needs.  When she is not available (works part-time), her father, patient's sonJean Marie will be present with patient. Tab states patient will have supervision/support 24/7.  Tab and Nathalia  "would like [facility] (Phone: [phone] Fax: [phone]), RN (resumed), PT and HHA.  MD to place home care orders.      Spoke with filipe Mitchell @ [facility].  Discussed planned discharge home today.  Ale confirms PT can be added, orders will need [facility] be obtained from [facility] provider.  Paula confirmed twice weekly HHA is available.  SO, Nathalia plans to connect with [facility] primary to obtain necessary documention.      Received call from JUAN JOSÉ SevillaCM @ [facility] ([phone]).  She voiced concern about patient's discharge plans.  She would like patient to discharge with family support.  Discussed family's plan for 24/7 support.  Di would like for patient to discharge with walker.  Tab confirms patient has standard walker.  Family and Di working to secure walker with seat attached from [facility].  Discussed with Di family's plan to help support patient's needs and increased home care services.  Encouraged Tab and Di to connect about family's plans.      CM extended invitation for pt to participate in [employer] for COVID virtual monitoring; pt and SO, Nathalia agrees.      Nathalia confirms mobile phone number.  Yoana Gold and sonJean Marie will be staying with patient and will be able to assist connection.       Cell #: [phone]      Informed pt that a nurse or care coordinator will call within 24 hours post discharge to check on symptoms and to inquire on pt s status.  Pt aware to look for an email (or text message) from [employer]. Pt informed that additional [employer] documents will be provided at time of discharge by beside RN.      Pt reports no further concerns/questions at this time.      CM to completed Clinic Care Coordination Handoff Referral on discharge.    *High Priority*  --- Covid-19 IP Home Monitoring.\"      Tab will provide transportation upon discharge this afternoon.        PLAN:  Home with family support get well loop and [facility] (Phone: [phone] Fax: [phone]), RN, " PT and HHA.        Meli Menchaca MSN, RN  Inpatient Care Coordinator  [facility] [phone]  [facility] [phone]

## 2021-10-25 NOTE — PROGRESS NOTES
Received oxygen intake, reviewed chart; all documents present other than a walk test.  - Spoke to the patient and patients aide to offer choice and discuss equipment; [facility] would [facility] to [facility] to [facility], but due to the process of sending information to the [facility] and them getting the patient set up within a few days; patent is ok with [facility] setting him up.   - Patient is aware we can get him transferred to the [facility] at a later date.  -oxygen will be delivered to bedside this afternoon pending on delivery of our showrooms oxygen equipment.  - Spoke with Omega to let him know that I have everything needed to set the patient up with home oxygen.

## 2021-10-26 ENCOUNTER — PATIENT OUTREACH (OUTPATIENT)
Dept: CARE COORDINATION | Facility: CLINIC | Age: 76
End: 2021-10-26

## 2021-10-26 LAB — GLUCOSE BLDC GLUCOMTR-MCNC: 140 MG/DL (ref 70–99)

## 2021-10-26 NOTE — PROGRESS NOTES
Care Coordination Hospital/ED Discharge Follow up Note    Patient referred for Home Virtual COVID-19 Monitoring Program.  First call made to complete assessment, verify or assist with scheduling follow up appt, and ensure patient is engaged with [facility], no answer.  RN unable to leave message as no voicemail option available.  RN will attempt call again tomorrow.       Pauly Barajas RN  [facility] Care Coordination

## 2021-10-27 NOTE — PROGRESS NOTES
Care Coordination Hospital/ED Discharge Follow up Note    Patient referred for Home Virtual COVID-19 Monitoring Program.  Second call made to complete assessment, verify or assist with scheduling follow up appt, and ensure patient is engaged with [facility], no answer.  Unable to leave message as no voicemail option available.  RN will make no further outreach attempts at this time.       Pauly Barajas RN  [facility] Care Coordination